# Patient Record
Sex: MALE | URBAN - METROPOLITAN AREA
[De-identification: names, ages, dates, MRNs, and addresses within clinical notes are randomized per-mention and may not be internally consistent; named-entity substitution may affect disease eponyms.]

---

## 2017-02-20 ENCOUNTER — APPOINTMENT (OUTPATIENT)
Age: 82
Setting detail: DERMATOLOGY
End: 2017-02-21

## 2017-02-20 DIAGNOSIS — L57.0 ACTINIC KERATOSIS: ICD-10-CM

## 2017-02-20 DIAGNOSIS — L82.1 OTHER SEBORRHEIC KERATOSIS: ICD-10-CM

## 2017-02-20 DIAGNOSIS — L82.0 INFLAMED SEBORRHEIC KERATOSIS: ICD-10-CM

## 2017-02-20 PROBLEM — D48.5 NEOPLASM OF UNCERTAIN BEHAVIOR OF SKIN: Status: ACTIVE | Noted: 2017-02-20

## 2017-02-20 PROCEDURE — 17110 DESTRUCT B9 LESION 1-14: CPT

## 2017-02-20 PROCEDURE — 99202 OFFICE O/P NEW SF 15 MIN: CPT | Mod: 25

## 2017-02-20 PROCEDURE — 11100: CPT | Mod: 59

## 2017-02-20 PROCEDURE — OTHER LIQUID NITROGEN: OTHER

## 2017-02-20 PROCEDURE — OTHER BIOPSY BY SHAVE METHOD: OTHER

## 2017-02-20 PROCEDURE — OTHER COUNSELING: OTHER

## 2017-02-20 PROCEDURE — 17003 DESTRUCT PREMALG LES 2-14: CPT

## 2017-02-20 PROCEDURE — 17000 DESTRUCT PREMALG LESION: CPT | Mod: 59

## 2017-02-20 ASSESSMENT — LOCATION SIMPLE DESCRIPTION DERM
LOCATION SIMPLE: LEFT FOREHEAD
LOCATION SIMPLE: LEFT SCALP

## 2017-02-20 ASSESSMENT — LOCATION ZONE DERM
LOCATION ZONE: FACE
LOCATION ZONE: SCALP

## 2017-02-20 ASSESSMENT — LOCATION DETAILED DESCRIPTION DERM
LOCATION DETAILED: LEFT SUPERIOR FOREHEAD
LOCATION DETAILED: LEFT LATERAL FRONTAL SCALP
LOCATION DETAILED: LEFT CENTRAL FRONTAL SCALP

## 2017-02-20 NOTE — PROCEDURE: BIOPSY BY SHAVE METHOD
Silver Nitrate Text: The wound bed was treated with silver nitrate after the biopsy was performed.
Hemostasis: Drysol
Electrodesiccation Text: The wound bed was treated with electrodesiccation after the biopsy was performed.
Size Of Lesion In Cm: 0
Biopsy Method: Double edge Personna blades
Billing Type: Third-Party Bill
Electrodesiccation And Curettage Text: The wound bed was treated with electrodesiccation and curettage after the biopsy was performed.
Bill For Surgical Tray: no
Anesthesia Type: 1% lidocaine with epinephrine and a 1:10 solution of 8.4% sodium bicarbonate
Notification Instructions: Patient will be notified of biopsy results. However, patient instructed to call the office if not contacted within 2 weeks.
Wound Care: Vaseline
Detail Level: Detailed
Curettage Text: The wound bed was treated with curettage after the biopsy was performed.
Consent: Written consent was obtained and risks were reviewed including but not limited to scarring, infection, bleeding, scabbing, incomplete removal, nerve damage and allergy to anesthesia.
Cryotherapy Text: The wound bed was treated with cryotherapy after the biopsy was performed.
Type Of Destruction Used: Curettage
Anesthesia Volume In Cc (Will Not Render If 0): 0.5
Dressing: bandage
Post-Care Instructions: I reviewed with the patient in detail post-care instructions. Patient is to keep the biopsy site dry overnight, and then apply bacitracin twice daily until healed. Patient may apply hydrogen peroxide soaks to remove any crusting.
Biopsy Type: H and E
Body Location Override (Optional - Billing Will Still Be Based On Selected Body Map Location If Applicable): L. Lateral forehead

## 2017-02-20 NOTE — PROCEDURE: LIQUID NITROGEN
Post-Care Instructions: I reviewed with the patient in detail post-care instructions. (Written instructions given) Patient is to wear sun protection, and avoid picking at any of the treated lesions. Pt may apply Vaseline to crusted or scabbing areas.
Total Number Of Aks Treated: 2
Duration Of Freeze Thaw-Cycle (Seconds): 15
Medical Necessity Clause: This procedure was medically necessary because the lesions that were treated were:
Consent: The patient's consent was obtained including but not limited to risks of crusting, scabbing, blistering, scarring, darker or lighter pigmentary change, recurrence, incomplete removal and infection.
Detail Level: Detailed
Detail Level: Simple
Duration Of Freeze Thaw-Cycle (Seconds): 10
Render Post-Care Instructions In Note?: no
Medical Necessity Information: It is in your best interest to select a reason for this procedure from the list below. All of these items fulfill various CMS LCD requirements except the new and changing color options.
Post-Care Instructions: I reviewed with the patient in detail post-care instructions. Patient is to wear sunprotection, and avoid picking at any of the treated lesions. Pt may apply Vaseline to crusted or scabbing areas.
Number Of Freeze-Thaw Cycles: 1 freeze-thaw cycle

## 2017-03-02 ENCOUNTER — APPOINTMENT (OUTPATIENT)
Age: 82
Setting detail: DERMATOLOGY
End: 2017-03-13

## 2017-03-02 PROBLEM — C44.91 BASAL CELL CARCINOMA OF SKIN, UNSPECIFIED: Status: ACTIVE | Noted: 2017-03-02

## 2017-03-02 PROCEDURE — OTHER MOHS SURGERY PHONE CONSULTATION: OTHER

## 2017-03-02 NOTE — PROCEDURE: MOHS SURGERY PHONE CONSULTATION
Has The Pathology Report Been Received?: Yes
Does The Patient Take Blood Thinners?: No
Patient Reported Location: forehead
If Yes- Details?: Subarachroid hemorrhage
Referring Provider: Izabela Salinas MPhil, MD
Date Of Mohs Surgery: 03/07/2017
Detail Level: Simple
Date Of Surgical Consultation If Needed: 03/02/2017
Time Of Mohs Surgery: 8:30

## 2017-03-07 ENCOUNTER — APPOINTMENT (OUTPATIENT)
Age: 82
Setting detail: DERMATOLOGY
End: 2017-03-13

## 2017-03-07 PROBLEM — C44.319 BASAL CELL CARCINOMA OF SKIN OF OTHER PARTS OF FACE: Status: ACTIVE | Noted: 2017-03-07

## 2017-03-07 PROCEDURE — 17312 MOHS ADDL STAGE: CPT

## 2017-03-07 PROCEDURE — 14041 TIS TRNFR F/C/C/M/N/A/G/H/F: CPT

## 2017-03-07 PROCEDURE — 17311 MOHS 1 STAGE H/N/HF/G: CPT

## 2017-03-07 PROCEDURE — OTHER MOHS SURGERY: OTHER

## 2017-03-07 NOTE — PROCEDURE: MOHS SURGERY
Body Location Override (Optional - Billing Will Still Be Based On Selected Body Map Location If Applicable): Left lateral forehead

## 2017-03-07 NOTE — PROCEDURE: MOHS SURGERY
Post-Care Instructions: The patient was provided with detailed verbal and written instructions for daily wound care, including use of H2O2 cleansing, followed by application of plain Vaseline and a bandage.  These instructions including Dr. Salinas's contact information should there be any questions or concerns.  The patient is not to engage in any heavy lifting, exercise, or swimming for the next week.  Should the patient develop any fevers, chills, bleeding, or pain not controlled by OTC analgesics, s/he should contact the office immediately.

## 2017-03-15 ENCOUNTER — APPOINTMENT (OUTPATIENT)
Age: 82
Setting detail: DERMATOLOGY
End: 2017-03-16

## 2017-03-15 DIAGNOSIS — Z48.02 ENCOUNTER FOR REMOVAL OF SUTURES: ICD-10-CM

## 2017-03-15 DIAGNOSIS — L81.4 OTHER MELANIN HYPERPIGMENTATION: ICD-10-CM

## 2017-03-15 DIAGNOSIS — L82.1 OTHER SEBORRHEIC KERATOSIS: ICD-10-CM

## 2017-03-15 PROCEDURE — 99214 OFFICE O/P EST MOD 30 MIN: CPT | Mod: 24

## 2017-03-15 PROCEDURE — OTHER SUTURE REMOVAL (GLOBAL PERIOD): OTHER

## 2017-03-15 PROCEDURE — OTHER COUNSELING: OTHER

## 2017-03-15 ASSESSMENT — LOCATION DETAILED DESCRIPTION DERM
LOCATION DETAILED: LEFT LATERAL FOREHEAD
LOCATION DETAILED: RIGHT MEDIAL UPPER BACK

## 2017-03-15 ASSESSMENT — LOCATION ZONE DERM
LOCATION ZONE: TRUNK
LOCATION ZONE: FACE

## 2017-03-15 ASSESSMENT — LOCATION SIMPLE DESCRIPTION DERM
LOCATION SIMPLE: LEFT FOREHEAD
LOCATION SIMPLE: RIGHT UPPER BACK

## 2017-03-15 NOTE — PROCEDURE: SUTURE REMOVAL (GLOBAL PERIOD)
Detail Level: Detailed
Add 60186 Cpt? (Important Note: In 2017 The Use Of 12517 Is Being Tracked By Cms To Determine Future Global Period Reimbursement For Global Periods): no

## 2017-04-25 ENCOUNTER — OFFICE VISIT (OUTPATIENT)
Dept: FAMILY MEDICINE | Facility: CLINIC | Age: 82
End: 2017-04-25
Payer: COMMERCIAL

## 2017-04-25 VITALS
WEIGHT: 175 LBS | DIASTOLIC BLOOD PRESSURE: 60 MMHG | OXYGEN SATURATION: 96 % | HEIGHT: 67 IN | BODY MASS INDEX: 27.47 KG/M2 | TEMPERATURE: 98 F | SYSTOLIC BLOOD PRESSURE: 126 MMHG | HEART RATE: 66 BPM | RESPIRATION RATE: 20 BRPM

## 2017-04-25 DIAGNOSIS — R09.82 POST-NASAL DRAINAGE: Primary | ICD-10-CM

## 2017-04-25 DIAGNOSIS — J30.2 SEASONAL ALLERGIC RHINITIS, UNSPECIFIED ALLERGIC RHINITIS TRIGGER: ICD-10-CM

## 2017-04-25 PROCEDURE — 99213 OFFICE O/P EST LOW 20 MIN: CPT | Performed by: INTERNAL MEDICINE

## 2017-04-25 NOTE — MR AVS SNAPSHOT
After Visit Summary   4/25/2017    Sharif Kumar    MRN: [id number]           Patient Information     Date Of Birth          1935        Visit Information        Provider Department      4/25/2017 2:15 PM Jose Maria Valera MD [facility]        Today's Diagnoses     Post-nasal drainage    -  1    Seasonal allergic rhinitis, unspecified allergic rhinitis trigger          Care Instructions    I suggest that you use flonase nasal spray, and use 2 sprays each nostril daily.        Also, take claritin or allegra or zyrtec daily.                     Follow-ups after your visit        Who to contact     If you have questions or need follow up information about today's clinic visit or your schedule please contact [facility] directly at [phone].  Normal or non-critical lab and imaging results will be communicated to you by MyChart, letter or phone within 4 business days after the clinic has received the results. If you do not hear from us within 7 days, please contact the clinic through MyChart or phone. If you have a critical or abnormal lab result, we will notify you by phone as soon as possible.  Submit refill requests through [employer] or call your pharmacy and they will forward the refill request to us. Please allow 3 business days for your refill to be completed.          Additional Information About Your Visit        MyChart Information     [employer] gives you secure access to your electronic health record. If you see a primary care provider, you can also send messages to your care team and make appointments. If you have questions, please call your primary care clinic.  If you do not have a primary care provider, please call [phone] and they will assist you.        Care EveryWhere ID     This is your Care EveryWhere ID. This could be used by other organizations to access your [place] medical records  [id number]        Your Vitals Were  "    Pulse Temperature Respirations Height Pulse Oximetry BMI (Body Mass Index)    66 98  F (36.7  C) 20 5' 7\" (1.702 m) 96% 27.41 kg/m2       Blood Pressure from Last 3 Encounters:   04/25/17 126/60   12/28/16 106/54   12/19/16 110/70    Weight from Last 3 Encounters:   04/25/17 175 lb (79.4 kg)   12/28/16 178 lb (80.7 kg)   12/19/16 171 lb 8 oz (77.8 kg)              Today, you had the following     No orders found for display       Primary Care Provider Office Phone # Fax #    Jose Maria Valera [phone]       [facility] [address] [facility] [address]        Thank you!     Thank you for choosing [facility]  for your care. Our goal is always to provide you with excellent care. Hearing back from our patients is one way we can continue to improve our services. Please take a few minutes to complete the written survey that you may receive in the mail after your visit with us. Thank you!             Your Updated Medication List - Protect others around you: Learn how to safely use, store and throw away your medicines at www.disposemymeds.org.          This list is accurate as of: 4/25/17  2:53 PM.  Always use your most recent med list.                   Brand Name Dispense Instructions for use    bismuth subsalicylate 262 MG chewable tablet    PEPTO BISMOL     Take 2 tablets by mouth daily as needed.       loratadine 10 MG tablet    CLARITIN    30 tablet    Take 1 tablet by mouth daily.       triamcinolone 0.5 % cream    KENALOG    30 g    Apply sparingly to affected area three times daily.         "

## 2017-04-25 NOTE — NURSING NOTE
"Chief Complaint   Patient presents with     Sinus Problem       Initial /60 (BP Location: Right arm, Patient Position: Chair, Cuff Size: Adult Regular)  Pulse 66  Temp 98  F (36.7  C)  Resp 20  Ht 5' 7\" (1.702 m)  Wt 175 lb (79.4 kg)  SpO2 96%  BMI 27.41 kg/m2 Estimated body mass index is 27.41 kg/(m^2) as calculated from the following:    Height as of this encounter: 5' 7\" (1.702 m).    Weight as of this encounter: 175 lb (79.4 kg).  Medication Reconciliation: complete   Jhoana Perez LPN  "

## 2017-04-25 NOTE — PATIENT INSTRUCTIONS
I suggest that you use flonase nasal spray, and use 2 sprays each nostril daily.        Also, take claritin or allegra or zyrtec daily.

## 2017-04-25 NOTE — PROGRESS NOTES
SUBJECTIVE:                                                    Sharif Kumar is a 81 year old male who presents to clinic today for the following health issues:      Acute Illness   Acute illness concerns: sinus issue  Onset: 3 weeks    Fever: no     Chills/Sweats: no     Headache (location?): YES- occ.    Sinus Pressure:no    Conjunctivitis:  no    Ear Pain: no    Rhinorrhea: no     Congestion: YES- occ.    Sore Throat: no      Cough: YES - occ.    Wheeze: no     Decreased Appetite: no     Nausea: no     Vomiting: no     Diarrhea:  no     Dysuria/Freq.: no     Fatigue/Achiness: YES-    Sick/Strep Exposure: no      Therapies Tried and outcome: nothing, but was on Z-ithromax recently from Dentist          C/o post nasal drainage and HA and some malaise, etc.,for 3 wks or so.                                        Had impacted food posterior molar, with infection; Rx zithromax.                            He does not recall using Flonase in the past.            Claritin is on his list of medications but he is not taking that.         He does report allergies to mold and pollen, and reports having had skin testing showing this.                              Problem list and histories reviewed & adjusted, as indicated.      Current Outpatient Prescriptions   Medication Sig Dispense Refill     triamcinolone (KENALOG) 0.5 % cream Apply sparingly to affected area three times daily. 30 g 0     bismuth subsalicylate (PEPTO BISMOL) 262 MG chewable tablet Take 2 tablets by mouth daily as needed.       loratadine (CLARITIN) 10 MG tablet Take 1 tablet by mouth daily. 30 tablet 1             Allergies   Allergen Reactions     No Known Drug Allergies      BP Readings from Last 3 Encounters:   04/25/17 126/60   12/28/16 106/54   12/19/16 110/70    Wt Readings from Last 3 Encounters:   04/25/17 175 lb (79.4 kg)   12/28/16 178 lb (80.7 kg)   12/19/16 171 lb 8 oz (77.8 kg)                    Reviewed and updated as needed this visit  "by clinical staff  Tobacco  Allergies  Meds  Med Hx  Surg Hx  Fam Hx  Soc Hx      Reviewed and updated as needed this visit by Provider         ROS:  CONSTITUTIONAL:NEGATIVE for fever, chills, change in weight  ENT/MOUTH: NEGATIVE for hoarseness and sore throat  RESP:NEGATIVE for significant cough or SOB    OBJECTIVE:                                                    /60 (BP Location: Right arm, Patient Position: Chair, Cuff Size: Adult Regular)  Pulse 66  Temp 98  F (36.7  C)  Resp 20  Ht 5' 7\" (1.702 m)  Wt 175 lb (79.4 kg)  SpO2 96%  BMI 27.41 kg/m2  Body mass index is 27.41 kg/(m^2).  GENERAL APPEARANCE: alert and no distress  HENT: ear canals and TM's normal and nose and mouth without ulcers or lesions  RESP: lungs clear to auscultation - no rales, rhonchi or wheezes    Diagnostic test results:  none      ASSESSMENT/PLAN:                                                        ICD-10-CM    1. Post-nasal drainage R09.82    2. Seasonal allergic rhinitis, unspecified allergic rhinitis trigger J30.2     mold,pollen       Antibiotics do not seem indicated here.  Patient Instructions   I suggest that you use flonase nasal spray, and use 2 sprays each nostril daily.        Also, take claritin or allegra or zyrtec daily.                   Jose Maria Valera MD  [facility]  "

## 2017-07-03 ENCOUNTER — TELEPHONE (OUTPATIENT)
Dept: FAMILY MEDICINE | Facility: CLINIC | Age: 82
End: 2017-07-03

## 2017-07-03 DIAGNOSIS — M79.604 PAIN OF RIGHT LOWER EXTREMITY: Primary | ICD-10-CM

## 2017-07-03 NOTE — TELEPHONE ENCOUNTER
Reason for Call:  Other call back    Detailed comments: pain in right calf-wants referral to PT perhaps says has been going on long time    Phone Number Patient can be reached at: Home number on file [phone] (home)    Best Time:     Can we leave a detailed message on this number? YES    Call taken on 7/3/2017 at 10:00 AM by SIN FLEMING

## 2017-07-03 NOTE — TELEPHONE ENCOUNTER
Patient called the clinic. Pt has pain/ache 3-7/10 from knee to hip on the right side.   He had a similar problem in December when the X-ray was taken 12/28/16.  Issue has resolved, but pt thinks he has re injured it. Walking exacerbates pain.   Pt tried a massage therapist who suggested that physical therapy would be more appropriate.   Requesting a referral to physical therapy, provider to review.

## 2017-07-03 NOTE — TELEPHONE ENCOUNTER
I have generated a referral order. Please  let the patient know,and give to him the phone info for KENIA.

## 2017-07-11 ENCOUNTER — THERAPY VISIT (OUTPATIENT)
Dept: PHYSICAL THERAPY | Facility: CLINIC | Age: 82
End: 2017-07-11
Payer: MEDICARE

## 2017-07-11 DIAGNOSIS — M79.651 PAIN OF RIGHT THIGH: Primary | ICD-10-CM

## 2017-07-11 PROCEDURE — G8978 MOBILITY CURRENT STATUS: HCPCS | Mod: GP | Performed by: PHYSICAL THERAPIST

## 2017-07-11 PROCEDURE — 97110 THERAPEUTIC EXERCISES: CPT | Mod: GP | Performed by: PHYSICAL THERAPIST

## 2017-07-11 PROCEDURE — 97161 PT EVAL LOW COMPLEX 20 MIN: CPT | Mod: GP | Performed by: PHYSICAL THERAPIST

## 2017-07-11 PROCEDURE — G8979 MOBILITY GOAL STATUS: HCPCS | Mod: GP | Performed by: PHYSICAL THERAPIST

## 2017-07-11 NOTE — PROGRESS NOTES
Subjective:    Patient is a 81 year old male presenting with rehab left ankle/foot hpi.                                      Pertinent medical history includes:  Depression, asthma and migraines.                                            Objective:    System    Physical Exam    General     ROS    Assessment/Plan:

## 2017-07-11 NOTE — LETTER
DEPARTMENT OF HEALTH AND HUMAN SERVICES  CENTERS FOR MEDICARE & MEDICAID SERVICES    PLAN/UPDATED PLAN OF PROGRESS FOR OUTPATIENT REHABILITATION    PATIENTS NAME:  Sharif Kumar   : 1935  PROVIDER NUMBER:    [id number]  [id number]   PROVIDER [facility]: [facility] PHYSICAL THERAPY  MEDICAL RECORD NUMBER: [id number]   START OF CARE DATE:  SOC Date: 17   TYPE:  PT  PRIMARY/TREATMENT DIAGNOSIS: (Pertinent Medical Diagnosis)  Pain of right thigh  VISITS FROM START OF CARE: 1 Rxs Used: 1     Physical Therapy Initial Evaluation  2017     Precautions/Restrictions/MD instructions: PT eval and treat.     Subjective:   Date of Onset: 16  Primary Symptoms/Location of Pain: R thigh pain anterolateral from hip to knee. Denies N/T, weakness, back pain. Quality of pain is dull and aching. Pains are described as intermittent in nature. Pain is worse: same throughout day. Pain is rated 5/10.   History of symptoms: Pains began gradually as the result of insidious onset. Tried massage, thought it was pretty well healed, did some exercises, and was able to take a trip to [facility] walking a ton. Since onset, symptoms are waxing and waning - gradually has come back. Possibly exacerbated by long drives a few weeks ago (lots of on/off gas pedal).   Worsened by: Walking >6 blocks.    Alleviated by: Ice.   General health as reported by patient: good  Pertinent medical/surgical history: Depression, asthma and migraines.. Imaging: x-ray (unremarkable). Current occupational status: Retired. Patient's goals are: decrease pain, be able to walk longer. Return to MD:  PRN.     Therapist Impression:   Sharif Kumar is a 81 year old male with 8-month history of R thigh pain. His impairments limit his ability to walk. Skilled PT services are necessary in order to reduce impairments and improve independent function.    Objective:    PATIENTS NAME:  Sharif Kumar   : 1935    HIP  EXAMINATION    Dynamic Movement Screen:  DL Squat: Anterior knee translation, Knee valgus, Hip internal rotation and Improper use of glutes/hips  Gait: Lateral trunk lean to L, decr trunk rotation and arm swing on L, greater relative ER on L, greater relative IR on R    HIP RANGE OF MOTION & STRENGTH  (* = patient s pain)   PROM L PROM R MMT L MMT R   Hip Flx 120 120* 5 4   Hip IR 90/90 20 20     Prone Bilateral IR       Hip ER 90/90 75 75     Hip ABD   3- 3-   Hip ADD   5 5   Hip Ext 5 5     Knee Ext   5 5   Knee Flx   4 4     Special Tests:  (+) R: None (-) R: FADIR, NINO and Scour  Lumbar/SIJ Screen:   Active ROM Limitation Pain   Flexion - -   Extension - -   L Sideglide - -   R Sideglide - -     Assessment/Plan:    The patient is a 81 year old male with chief complaint of R thigh pain.    The patient has the following significant findings with corresponding treatment plan.  Diagnosis 1:  R thigh pain    Pain -  hot/cold therapy, manual therapy, splint/taping/bracing/orthotics and self management  Decreased ROM/flexibility - manual therapy, therapeutic exercise and home program  Decreased joint mobility - manual therapy and therapeutic exercise  Decreased strength - therapeutic exercise, therapeutic activities and home program  Impaired gait - gait training  Impaired muscle performance - neuro re-education  Decreased function - therapeutic activities  Therapy Evaluation Codes:   1) History comprised of:   Personal factors that impact the plan of care:      None.    Comorbidity factors that impact the plan of care are:      None.     Medications impacting care: None.  2) Examination of Body Systems comprised of:  PATIENTS NAME:  Sharif Kumar   : 1935      Body structures and functions that impact the plan of care:      Hip.   Activity limitations that impact the plan of care are:      Walking.   Clinical presentation characteristics are:    Stable/Uncomplicated.  3) Presentation comprised  "of:   Presentation scored as Low complexity with uncomplicated characteristics..  4) Decision-Making    Low complexity using standardized patient assessment instrument and/or measureable assessment of functional outcome.  Cumulative Therapy Evaluation is: Low complexity.  Previous and current functional limitations:  (See Goal Flow Sheet for this information)    Short term and Long term goals: (See Goal Flow Sheet for this information)   Communication ability:  Patient appears to be able to clearly communicate and understand verbal and written communication and follow directions correctly.  Treatment Explanation - The following has been discussed with the patient: RX ordered/plan of care, anticipated outcomes, and possible risks and side effects.  This patient would benefit from PT intervention to resume normal activities.   Rehab potential is good.  Frequency:  1 X week, once daily  Duration:  for 6 weeks  Discharge Plan: Achieve all LTGs, be independent in home treatment program, and reach maximal therapeutic benefit.                            Caregiver Signature/Credentials _____________________________ Date ________       Treating Provider: Mikael Martinez DPT, OCS   I have reviewed and certified the need for these services and plan of treatment while under my care.        PHYSICIAN'S SIGNATURE:   ____________________________________  Date___________     Jose Maria NERI Valera    Certification period:  Beginning of Cert date period: 07/11/17 to  End of Cert period date: 09/29/17     Functional Level Progress Report: Please see attached \"Goal Flow sheet for Functional level.\"    ____X____ Continue Services or       ________ DC Services                Service dates: From  SOC Date: 07/11/17 date to present                         "

## 2017-07-11 NOTE — PROGRESS NOTES
Physical Therapy Initial Evaluation  July 11, 2017     Precautions/Restrictions/MD instructions: PT eval and treat.     Subjective:   Date of Onset: 11/1/16  Primary Symptoms/Location of Pain: R thigh pain anterolateral from hip to knee. Denies N/T, weakness, back pain. Quality of pain is dull and aching. Pains are described as intermittent in nature. Pain is worse: same throughout day. Pain is rated 5/10.   History of symptoms: Pains began gradually as the result of insidious onset. Tried massage, thought it was pretty well healed, did some exercises, and was able to take a trip to [place] walking a ton. Since onset, symptoms are waxing and waning - gradually has come back. Possibly exacerbated by long drives a few weeks ago (lots of on/off gas pedal).   Worsened by: Walking >6 blocks.    Alleviated by: Ice.   General health as reported by patient: good  Pertinent medical/surgical history: none. Imaging: x-ray (unremarkable). Current occupational status: Retired. Patient's goals are: decrease pain, be able to walk longer. Return to MD:  PRN.     Therapist Impression:   Sharif Kumar is a 81 year old male with 8-month history of R thigh pain. His impairments limit his ability to walk. Skilled PT services are necessary in order to reduce impairments and improve independent function.    Objective:  HIP EXAMINATION    Dynamic Movement Screen:  DL Squat: Anterior knee translation, Knee valgus, Hip internal rotation and Improper use of glutes/hips  Gait: Lateral trunk lean to L, decr trunk rotation and arm swing on L, greater relative ER on L, greater relative IR on R    HIP RANGE OF MOTION & STRENGTH  (* = patient s pain)   PROM L PROM R MMT L MMT R   Hip Flx 120 120* 5 4   Hip IR 90/90 20 20     Prone Bilateral IR       Hip ER 90/90 75 75     Hip ABD   3- 3-   Hip ADD   5 5   Hip Ext 5 5     Knee Ext   5 5   Knee Flx   4 4     Special Tests:  (+) R: None (-) R: FADIR NINO and Jamal    Lumbar/SIJ Screen:   Active ROM  Limitation Pain   Flexion - -   Extension - -   L Sideglide - -   R Sideglide - -     Assessment/Plan:    The patient is a 81 year old male with chief complaint of R thigh pain.    The patient has the following significant findings with corresponding treatment plan.  Diagnosis 1:  R thigh pain    Pain -  hot/cold therapy, manual therapy, splint/taping/bracing/orthotics and self management  Decreased ROM/flexibility - manual therapy, therapeutic exercise and home program  Decreased joint mobility - manual therapy and therapeutic exercise  Decreased strength - therapeutic exercise, therapeutic activities and home program  Impaired gait - gait training  Impaired muscle performance - neuro re-education  Decreased function - therapeutic activities    Therapy Evaluation Codes:   1) History comprised of:   Personal factors that impact the plan of care:      None.    Comorbidity factors that impact the plan of care are:      None.     Medications impacting care: None.  2) Examination of Body Systems comprised of:   Body structures and functions that impact the plan of care:      Hip.   Activity limitations that impact the plan of care are:      Walking.   Clinical presentation characteristics are:    Stable/Uncomplicated.  3) Presentation comprised of:   Presentation scored as Low complexity with uncomplicated characteristics..  4) Decision-Making    Low complexity using standardized patient assessment instrument and/or measureable assessment of functional outcome.  Cumulative Therapy Evaluation is: Low complexity.    Previous and current functional limitations:  (See Goal Flow Sheet for this information)    Short term and Long term goals: (See Goal Flow Sheet for this information)     Communication ability:  Patient appears to be able to clearly communicate and understand verbal and written communication and follow directions correctly.  Treatment Explanation - The following has been discussed with the patient: RX  ordered/plan of care, anticipated outcomes, and possible risks and side effects.  This patient would benefit from PT intervention to resume normal activities.   Rehab potential is good.    Frequency:  1 X week, once daily  Duration:  for 6 weeks  Discharge Plan: Achieve all LTGs, be independent in home treatment program, and reach maximal therapeutic benefit.    Please refer to the daily flowsheet for treatment today, total treatment time and time spent performing 1:1 timed codes.

## 2017-07-13 ENCOUNTER — TELEPHONE (OUTPATIENT)
Dept: FAMILY MEDICINE | Facility: CLINIC | Age: 82
End: 2017-07-13

## 2017-07-13 NOTE — TELEPHONE ENCOUNTER
Reason for Call:  Form, our goal is to have forms completed with 72 hours, however, some forms may require a visit or additional information.    Type of letter, form or note:  medical    Who is the form from?: Home care    Where did the form come from: form was faxed in    What clinic location was the form placed at?: [facility]    Where the form was placed: 's Box: Jose Maria Valera MD    What number is listed as a contact on the form?: [phone]       Additional comments: [facility] on [facility]  [facility]    Call taken on 7/13/2017 at 11:44 AM by Amelia Puga

## 2017-07-18 ENCOUNTER — THERAPY VISIT (OUTPATIENT)
Dept: PHYSICAL THERAPY | Facility: CLINIC | Age: 82
End: 2017-07-18
Payer: MEDICARE

## 2017-07-18 DIAGNOSIS — M79.651 PAIN OF RIGHT THIGH: ICD-10-CM

## 2017-07-18 PROCEDURE — 97140 MANUAL THERAPY 1/> REGIONS: CPT | Mod: GP | Performed by: PHYSICAL THERAPIST

## 2017-07-18 PROCEDURE — 97110 THERAPEUTIC EXERCISES: CPT | Mod: GP | Performed by: PHYSICAL THERAPIST

## 2017-08-01 ENCOUNTER — THERAPY VISIT (OUTPATIENT)
Dept: PHYSICAL THERAPY | Facility: CLINIC | Age: 82
End: 2017-08-01
Payer: MEDICARE

## 2017-08-01 DIAGNOSIS — M79.651 PAIN OF RIGHT THIGH: ICD-10-CM

## 2017-08-01 PROCEDURE — 97530 THERAPEUTIC ACTIVITIES: CPT | Mod: GP | Performed by: PHYSICAL THERAPIST

## 2017-08-01 PROCEDURE — 97140 MANUAL THERAPY 1/> REGIONS: CPT | Mod: GP | Performed by: PHYSICAL THERAPIST

## 2017-08-01 PROCEDURE — 97110 THERAPEUTIC EXERCISES: CPT | Mod: GP | Performed by: PHYSICAL THERAPIST

## 2017-08-16 ENCOUNTER — THERAPY VISIT (OUTPATIENT)
Dept: PHYSICAL THERAPY | Facility: CLINIC | Age: 82
End: 2017-08-16
Payer: MEDICARE

## 2017-08-16 DIAGNOSIS — M79.651 PAIN OF RIGHT THIGH: ICD-10-CM

## 2017-08-16 PROCEDURE — 97530 THERAPEUTIC ACTIVITIES: CPT | Mod: GP | Performed by: PHYSICAL THERAPIST

## 2017-08-16 PROCEDURE — 97110 THERAPEUTIC EXERCISES: CPT | Mod: GP | Performed by: PHYSICAL THERAPIST

## 2017-08-16 PROCEDURE — 97140 MANUAL THERAPY 1/> REGIONS: CPT | Mod: GP | Performed by: PHYSICAL THERAPIST

## 2017-08-30 ENCOUNTER — THERAPY VISIT (OUTPATIENT)
Dept: PHYSICAL THERAPY | Facility: CLINIC | Age: 82
End: 2017-08-30
Payer: MEDICARE

## 2017-08-30 DIAGNOSIS — M79.651 PAIN OF RIGHT THIGH: ICD-10-CM

## 2017-08-30 PROCEDURE — 97530 THERAPEUTIC ACTIVITIES: CPT | Mod: GP | Performed by: PHYSICAL THERAPIST

## 2017-08-30 PROCEDURE — 97140 MANUAL THERAPY 1/> REGIONS: CPT | Mod: GP | Performed by: PHYSICAL THERAPIST

## 2017-08-30 PROCEDURE — 97110 THERAPEUTIC EXERCISES: CPT | Mod: GP | Performed by: PHYSICAL THERAPIST

## 2017-09-13 ENCOUNTER — THERAPY VISIT (OUTPATIENT)
Dept: PHYSICAL THERAPY | Facility: CLINIC | Age: 82
End: 2017-09-13
Payer: MEDICARE

## 2017-09-13 DIAGNOSIS — M79.651 PAIN OF RIGHT THIGH: ICD-10-CM

## 2017-09-13 PROCEDURE — 97530 THERAPEUTIC ACTIVITIES: CPT | Mod: GP | Performed by: PHYSICAL THERAPIST

## 2017-09-13 PROCEDURE — 97110 THERAPEUTIC EXERCISES: CPT | Mod: GP | Performed by: PHYSICAL THERAPIST

## 2017-09-13 PROCEDURE — 97112 NEUROMUSCULAR REEDUCATION: CPT | Mod: GP | Performed by: PHYSICAL THERAPIST

## 2017-09-18 ENCOUNTER — APPOINTMENT (OUTPATIENT)
Age: 82
Setting detail: DERMATOLOGY
End: 2017-10-02

## 2017-09-18 DIAGNOSIS — Z85.828 PERSONAL HISTORY OF OTHER MALIGNANT NEOPLASM OF SKIN: ICD-10-CM

## 2017-09-18 DIAGNOSIS — L82.1 OTHER SEBORRHEIC KERATOSIS: ICD-10-CM

## 2017-09-18 DIAGNOSIS — L738 OTHER SPECIFIED DISEASES OF HAIR AND HAIR FOLLICLES: ICD-10-CM

## 2017-09-18 DIAGNOSIS — L663 OTHER SPECIFIED DISEASES OF HAIR AND HAIR FOLLICLES: ICD-10-CM

## 2017-09-18 DIAGNOSIS — L81.4 OTHER MELANIN HYPERPIGMENTATION: ICD-10-CM

## 2017-09-18 DIAGNOSIS — L57.0 ACTINIC KERATOSIS: ICD-10-CM

## 2017-09-18 DIAGNOSIS — D22 MELANOCYTIC NEVI: ICD-10-CM

## 2017-09-18 PROBLEM — D48.5 NEOPLASM OF UNCERTAIN BEHAVIOR OF SKIN: Status: ACTIVE | Noted: 2017-09-18

## 2017-09-18 PROBLEM — L02.425 FURUNCLE OF RIGHT LOWER LIMB: Status: ACTIVE | Noted: 2017-09-18

## 2017-09-18 PROBLEM — D22.5 MELANOCYTIC NEVI OF TRUNK: Status: ACTIVE | Noted: 2017-09-18

## 2017-09-18 PROCEDURE — 11100: CPT | Mod: 59

## 2017-09-18 PROCEDURE — OTHER BIOPSY BY SHAVE METHOD: OTHER

## 2017-09-18 PROCEDURE — OTHER LIQUID NITROGEN: OTHER

## 2017-09-18 PROCEDURE — OTHER MIPS QUALITY: OTHER

## 2017-09-18 PROCEDURE — 99214 OFFICE O/P EST MOD 30 MIN: CPT | Mod: 25

## 2017-09-18 PROCEDURE — OTHER COUNSELING: OTHER

## 2017-09-18 PROCEDURE — 17000 DESTRUCT PREMALG LESION: CPT

## 2017-09-18 ASSESSMENT — LOCATION DETAILED DESCRIPTION DERM
LOCATION DETAILED: RIGHT ANTERIOR PROXIMAL THIGH
LOCATION DETAILED: INFERIOR THORACIC SPINE
LOCATION DETAILED: LEFT LATERAL FOREHEAD
LOCATION DETAILED: LEFT INFERIOR MEDIAL UPPER BACK
LOCATION DETAILED: LEFT SUPERIOR PARIETAL SCALP

## 2017-09-18 ASSESSMENT — LOCATION SIMPLE DESCRIPTION DERM
LOCATION SIMPLE: LEFT FOREHEAD
LOCATION SIMPLE: UPPER BACK
LOCATION SIMPLE: LEFT UPPER BACK
LOCATION SIMPLE: RIGHT THIGH
LOCATION SIMPLE: SCALP

## 2017-09-18 ASSESSMENT — LOCATION ZONE DERM
LOCATION ZONE: FACE
LOCATION ZONE: LEG
LOCATION ZONE: TRUNK
LOCATION ZONE: SCALP

## 2017-09-18 NOTE — PROCEDURE: BIOPSY BY SHAVE METHOD
Hemostasis: Drysol
Silver Nitrate Text: The wound bed was treated with silver nitrate after the biopsy was performed.
Bill For Surgical Tray: no
Cryotherapy Text: The wound bed was treated with cryotherapy after the biopsy was performed.
Billing Type: Third-Party Bill
Anesthesia Volume In Cc (Will Not Render If 0): 0.3
Electrodesiccation Text: The wound bed was treated with electrodesiccation after the biopsy was performed.
Biopsy Method: Double edge Personna blades
Anesthesia Type: 0.5% lidocaine with 1:200,000 epinephrine
Post-Care Instructions: I reviewed with the patient in detail post-care instructions. Patient is to keep the biopsy site dry overnight, and then apply H2O2, Vaseline and a bandage daily until healed.
Dressing: bandage
Render Post-Care Instructions In Note?: yes
Type Of Destruction Used: Electrodesiccation
Curettage Text: The wound bed was treated with curettage after the biopsy was performed.
Size Of Lesion In Cm: 0.5
Consent: Written consent was obtained and risks were reviewed including but not limited to scarring, infection, bleeding, scabbing, incomplete removal, nerve damage and allergy to anesthesia.
Notification Instructions: Patient will be notified of biopsy results. However, patient instructed to call the office if not contacted within 2 weeks.
Electrodesiccation And Curettage Text: The wound bed was treated with electrodesiccation and curettage after the biopsy was performed.
Body Location Override (Optional - Billing Will Still Be Based On Selected Body Map Location If Applicable): left lateral cheek
Biopsy Type: H and E
Additional Anesthesia Volume In Cc (Will Not Render If 0): 0
Detail Level: Detailed
Wound Care: Vaseline

## 2017-09-18 NOTE — PROCEDURE: MIPS QUALITY
Detail Level: Detailed
Quality 131: Pain Assessment And Follow-Up: Pain assessment using a standardized tool is documented as negative, no follow-up plan required
Quality 265: Biopsy Follow-Up: Biopsy results reviewed, communicated, tracked, and documented
Quality 431: Preventive Care And Screening: Unhealthy Alcohol Use - Screening: Patient screened for unhealthy alcohol use using a single question and scores less than 2 times per year
Quality 226: Preventive Care And Screening: Tobacco Use: Screening And Cessation Intervention: Patient screened for tobacco and is an ex-smoker
Quality 130: Documentation Of Current Medications In The Medical Record: Current Medications Documented
Quality 110: Preventive Care And Screening: Influenza Immunization: Influenza Immunization Ordered or Recommended, but not Administered
Quality 110: Preventive Care And Screening: Influenza Immunization: Influenza Immunization Administered during Influenza season

## 2017-09-18 NOTE — HPI: SKIN CHECK
What Is The Reason For Today's Visit?: the risk of recurrence, and the development of new lesions
Additional History: He has multiple pigmented lesions distributed throughout the body that he would like checked today. These are asymptomatic, mild in severity, present for years and have not been treated. Specifically, he has a a red lesion on his left cheek which has been present for 1 years. He has previously cut this lesion with his razor causing it to bleed. He also notices right areas on his scalp.

## 2017-09-18 NOTE — PROCEDURE: LIQUID NITROGEN
Post-Care Instructions: I reviewed with the patient in detail post-care instructions. Patient is to wear sunprotection, and avoid picking at any of the treated lesions. Pt may apply Vaseline to crusted or scabbing areas.
Consent: The patient's consent was obtained including but not limited to risks of crusting, scabbing, blistering, scarring, darker or lighter pigmentary change, recurrence, incomplete removal and infection.
Detail Level: Detailed
Number Of Freeze-Thaw Cycles: 1 freeze-thaw cycle
Render Post-Care Instructions In Note?: yes
Duration Of Freeze Thaw-Cycle (Seconds): 15

## 2017-09-26 ENCOUNTER — THERAPY VISIT (OUTPATIENT)
Dept: PHYSICAL THERAPY | Facility: CLINIC | Age: 82
End: 2017-09-26
Payer: MEDICARE

## 2017-09-26 DIAGNOSIS — M79.651 PAIN OF RIGHT THIGH: ICD-10-CM

## 2017-09-26 PROCEDURE — 97110 THERAPEUTIC EXERCISES: CPT | Mod: GP | Performed by: PHYSICAL THERAPIST

## 2017-09-26 PROCEDURE — 97530 THERAPEUTIC ACTIVITIES: CPT | Mod: GP | Performed by: PHYSICAL THERAPIST

## 2017-10-13 ENCOUNTER — APPOINTMENT (OUTPATIENT)
Age: 82
Setting detail: DERMATOLOGY
End: 2017-10-18

## 2017-10-13 PROBLEM — C44.319 BASAL CELL CARCINOMA OF SKIN OF OTHER PARTS OF FACE: Status: ACTIVE | Noted: 2017-10-13

## 2017-10-13 PROCEDURE — OTHER MOHS SURGERY PHONE CONSULTATION: OTHER

## 2017-10-13 NOTE — PROCEDURE: MOHS SURGERY PHONE CONSULTATION
Has The Patient Ever Had A Joint Replaced?: No
Office Location Of Mohs Surgery: [facility]
Detail Level: Detailed
Has The Patient Ever Had A Heart Attack Or Stroke?: Yes
Date Of Surgical Consultation If Needed: 10/13/2017
Time Of Mohs Surgery: 1100
Date Of Mohs Surgery: 10/17/2017
Patient Reported Location: left cheek in front of ear
Referring Provider: Izabela Salinas MD

## 2017-10-17 ENCOUNTER — APPOINTMENT (OUTPATIENT)
Age: 82
Setting detail: DERMATOLOGY
End: 2017-10-18

## 2017-10-17 DIAGNOSIS — L72.8 OTHER FOLLICULAR CYSTS OF THE SKIN AND SUBCUTANEOUS TISSUE: ICD-10-CM

## 2017-10-17 DIAGNOSIS — L57.0 ACTINIC KERATOSIS: ICD-10-CM

## 2017-10-17 PROBLEM — C44.319 BASAL CELL CARCINOMA OF SKIN OF OTHER PARTS OF FACE: Status: ACTIVE | Noted: 2017-10-17

## 2017-10-17 PROCEDURE — OTHER MOHS SURGERY: OTHER

## 2017-10-17 PROCEDURE — 13132 CMPLX RPR F/C/C/M/N/AX/G/H/F: CPT | Mod: 59

## 2017-10-17 PROCEDURE — 99213 OFFICE O/P EST LOW 20 MIN: CPT | Mod: 25

## 2017-10-17 PROCEDURE — 17311 MOHS 1 STAGE H/N/HF/G: CPT

## 2017-10-17 PROCEDURE — 17312 MOHS ADDL STAGE: CPT

## 2017-10-17 PROCEDURE — OTHER MIPS QUALITY: OTHER

## 2017-10-17 PROCEDURE — 17003 DESTRUCT PREMALG LES 2-14: CPT

## 2017-10-17 PROCEDURE — 17000 DESTRUCT PREMALG LESION: CPT

## 2017-10-17 PROCEDURE — OTHER COUNSELING: OTHER

## 2017-10-17 PROCEDURE — OTHER LIQUID NITROGEN: OTHER

## 2017-10-17 ASSESSMENT — LOCATION ZONE DERM
LOCATION ZONE: NECK
LOCATION ZONE: SCALP

## 2017-10-17 ASSESSMENT — LOCATION DETAILED DESCRIPTION DERM
LOCATION DETAILED: RIGHT CENTRAL PARIETAL SCALP
LOCATION DETAILED: RIGHT SUPERIOR POSTERIOR NECK
LOCATION DETAILED: RIGHT SUPERIOR PARIETAL SCALP

## 2017-10-17 ASSESSMENT — LOCATION SIMPLE DESCRIPTION DERM
LOCATION SIMPLE: SCALP
LOCATION SIMPLE: POSTERIOR NECK

## 2017-10-17 NOTE — PROCEDURE: MOHS SURGERY
Body Location Override (Optional - Billing Will Still Be Based On Selected Body Map Location If Applicable): Left Lateral Cheek

## 2017-10-17 NOTE — PROCEDURE: MIPS QUALITY
Quality 431: Preventive Care And Screening: Unhealthy Alcohol Use - Screening: Patient screened for unhealthy alcohol use using a single question and scores less than 2 times per year
Quality 226: Preventive Care And Screening: Tobacco Use: Screening And Cessation Intervention: Patient screened for tobacco and is an ex-smoker
Detail Level: Detailed
Quality 130: Documentation Of Current Medications In The Medical Record: Current Medications Documented
Quality 110: Preventive Care And Screening: Influenza Immunization: Influenza Immunization previously received during influenza season
Quality 131: Pain Assessment And Follow-Up: Pain assessment using a standardized tool is documented as negative, no follow-up plan required

## 2017-10-17 NOTE — PROCEDURE: LIQUID NITROGEN
Number Of Freeze-Thaw Cycles: 1 freeze-thaw cycle
Total Number Of Aks Treated: 5
Duration Of Freeze Thaw-Cycle (Seconds): 10
Render Post-Care Instructions In Note?: yes
Detail Level: Detailed
Consent: The patient's consent was obtained including but not limited to risks of crusting, scabbing, blistering, scarring, darker or lighter pigmentary change, recurrence, incomplete removal and infection.
Post-Care Instructions: I reviewed with the patient in detail post-care instructions. (Written instructions given) Patient is to wear sun protection, and avoid picking at any of the treated lesions. Pt may apply Vaseline to crusted or scabbing areas.

## 2017-10-24 ENCOUNTER — APPOINTMENT (OUTPATIENT)
Age: 82
Setting detail: DERMATOLOGY
End: 2017-10-30

## 2017-10-24 DIAGNOSIS — Z48.02 ENCOUNTER FOR REMOVAL OF SUTURES: ICD-10-CM

## 2017-10-24 PROCEDURE — OTHER COUNSELING: OTHER

## 2017-10-24 PROCEDURE — OTHER SUTURE REMOVAL (GLOBAL PERIOD): OTHER

## 2017-10-24 ASSESSMENT — LOCATION SIMPLE DESCRIPTION DERM: LOCATION SIMPLE: LEFT CHEEK

## 2017-10-24 ASSESSMENT — LOCATION DETAILED DESCRIPTION DERM: LOCATION DETAILED: LEFT LATERAL MALAR CHEEK

## 2017-10-24 ASSESSMENT — LOCATION ZONE DERM: LOCATION ZONE: FACE

## 2017-10-24 NOTE — PROCEDURE: SUTURE REMOVAL (GLOBAL PERIOD)
Detail Level: Detailed
Add 63564 Cpt? (Important Note: In 2017 The Use Of 75009 Is Being Tracked By Cms To Determine Future Global Period Reimbursement For Global Periods): no
Body Location Override (Optional - Billing Will Still Be Based On Selected Body Map Location If Applicable): left lateral cheek

## 2017-11-04 ENCOUNTER — OFFICE VISIT (OUTPATIENT)
Dept: FAMILY MEDICINE | Facility: CLINIC | Age: 82
End: 2017-11-04
Payer: COMMERCIAL

## 2017-11-04 VITALS
RESPIRATION RATE: 20 BRPM | TEMPERATURE: 97.3 F | HEIGHT: 65 IN | DIASTOLIC BLOOD PRESSURE: 50 MMHG | HEART RATE: 73 BPM | SYSTOLIC BLOOD PRESSURE: 98 MMHG | OXYGEN SATURATION: 98 % | BODY MASS INDEX: 27.66 KG/M2 | WEIGHT: 166 LBS

## 2017-11-04 DIAGNOSIS — M54.41 CHRONIC MIDLINE LOW BACK PAIN WITH RIGHT-SIDED SCIATICA: Primary | ICD-10-CM

## 2017-11-04 DIAGNOSIS — G89.29 CHRONIC MIDLINE LOW BACK PAIN WITH RIGHT-SIDED SCIATICA: Primary | ICD-10-CM

## 2017-11-04 DIAGNOSIS — H61.23 BILATERAL IMPACTED CERUMEN: ICD-10-CM

## 2017-11-04 DIAGNOSIS — Z23 NEED FOR PROPHYLACTIC VACCINATION AND INOCULATION AGAINST INFLUENZA: ICD-10-CM

## 2017-11-04 PROCEDURE — 99213 OFFICE O/P EST LOW 20 MIN: CPT | Mod: 25 | Performed by: INTERNAL MEDICINE

## 2017-11-04 PROCEDURE — G0008 ADMIN INFLUENZA VIRUS VAC: HCPCS | Performed by: INTERNAL MEDICINE

## 2017-11-04 PROCEDURE — 90662 IIV NO PRSV INCREASED AG IM: CPT | Performed by: INTERNAL MEDICINE

## 2017-11-04 PROCEDURE — 69210 REMOVE IMPACTED EAR WAX UNI: CPT | Performed by: INTERNAL MEDICINE

## 2017-11-04 NOTE — MR AVS SNAPSHOT
After Visit Summary   11/4/2017    Sharif Kumar    MRN: [id number]           Patient Information     Date Of Birth          1935        Visit Information        Provider Department      11/4/2017 9:45 AM Jose Maria Valera MD [facility]        Today's Diagnoses     Chronic midline low back pain with right-sided sciatica    -  1    Bilateral impacted cerumen        Need for prophylactic vaccination and inoculation against influenza          Care Instructions    You are planning an MRI of your lumbar spine.           Follow-ups after your visit        Additional Services     RADIOLOGY REFERRAL       Your provider has referred you to: [facility]: [facility] [phone]   [email or web address]                   Please do an MRI of the lumbar spine.                He has symptoms of a right sided L3-4 radiculopathy.                        Please be aware that coverage of these services is subject to the terms and limitations of your health insurance plan.  Call member services at your health plan with any benefit or coverage questions.      Please bring the following to your appointment:    >>   Any x-rays, CTs or MRIs which have been performed.  Contact the facility where they were done to arrange for  prior to your scheduled appointment.    >>   List of current medications   >>   This referral request   >>   Any documents/labs given to you for this referral    Prior authorization is required for MRI/MRA, CT, Dexa Scans and Worker's Compensation cases.                  Who to contact     If you have questions or need follow up information about today's clinic visit or your schedule please contact [facility] directly at [phone].  Normal or non-critical lab and imaging results will be communicated to you by MyChart, letter or phone within 4 business days after the clinic has received the results. If you do not hear from us  "within 7 days, please contact the clinic through [employer] or phone. If you have a critical or abnormal lab result, we will notify you by phone as soon as possible.  Submit refill requests through [employer] or call your pharmacy and they will forward the refill request to us. Please allow 3 business days for your refill to be completed.          Additional Information About Your Visit        [employer] Information     [employer] gives you secure access to your electronic health record. If you see a primary care provider, you can also send messages to your care team and make appointments. If you have questions, please call your primary care clinic.  If you do not have a primary care provider, please call [phone] and they will assist you.        Care EveryWhere ID     This is your Care EveryWhere ID. This could be used by other organizations to access your [facility] medical records  [id number]        Your Vitals Were     Pulse Temperature Respirations Height Pulse Oximetry BMI (Body Mass Index)    73 97.3  F (36.3  C) (Tympanic) 20 5' 5.25\" (1.657 m) 98% 27.41 kg/m2       Blood Pressure from Last 3 Encounters:   11/04/17 98/50   04/25/17 126/60   12/28/16 106/54    Weight from Last 3 Encounters:   11/04/17 166 lb (75.3 kg)   04/25/17 175 lb (79.4 kg)   12/28/16 178 lb (80.7 kg)              We Performed the Following     Asthma Action Plan (AAP)     RADIOLOGY REFERRAL        Primary Care Provider Office Phone # Fax #    Jose Maria Valera [phone] [address] [facility] [address]        Equal Access to Services     [facility] : Hadii liang Conner, waaxda jose, qaybta tae min. So [facility] [phone].    ATENCIÓN: Si habla español, tiene a woody disposición servicios gratuitos de asistencia lingüística. Llame al [phone].    We comply with applicable federal civil rights laws and [place] laws. We do not discriminate on " the basis of race, color, national origin, age, disability, sex, sexual orientation, or gender identity.            Thank you!     Thank you for choosing [facility]  for your care. Our goal is always to provide you with excellent care. Hearing back from our patients is one way we can continue to improve our services. Please take a few minutes to complete the written survey that you may receive in the mail after your visit with us. Thank you!             Your Updated Medication List - Protect others around you: Learn how to safely use, store and throw away your medicines at www.disposemymeds.org.          This list is accurate as of: 11/4/17 10:08 AM.  Always use your most recent med list.                   Brand Name Dispense Instructions for use Diagnosis    bismuth subsalicylate 262 MG chewable tablet    PEPTO BISMOL     Take 2 tablets by mouth daily as needed.        loratadine 10 MG tablet    CLARITIN    30 tablet    Take 1 tablet by mouth daily.    Chronic rhinitis       triamcinolone 0.5 % cream    KENALOG    30 g    Apply sparingly to affected area three times daily.    Dermatitis

## 2017-11-04 NOTE — PROGRESS NOTES
"  SUBJECTIVE:   Sharif Kumar is a 82 year old male who presents to clinic today for the following health issues:    Musculoskeletal problem/pain      Duration: x 6 months    Description  Location: Right Hip    Intensity:  moderate    Accompanying signs and symptoms: none    History  Previous similar problem: YES  Previous evaluation:  x-ray    Precipitating or alleviating factors:  Trauma or overuse: no   Aggravating factors include: standing    Therapies tried and outcome: massage and physical therapy      He has ongoing aching discomfort in the right upper anterior leg from the groin to the knee.        This bothers him with walking or standing.              an x-ray last December showed mild arthritic changes.            Physical therapy has been minimally helpful       Problem list and histories reviewed & adjusted, as indicated.  Additional history: as documented    BP Readings from Last 3 Encounters:   11/04/17 98/50   04/25/17 126/60   12/28/16 106/54    Wt Readings from Last 3 Encounters:   11/04/17 166 lb (75.3 kg)   04/25/17 175 lb (79.4 kg)   12/28/16 178 lb (80.7 kg)                      Reviewed and updated as needed this visit by clinical staffTobacco  Allergies  Meds  Problems  Med Hx  Surg Hx  Fam Hx  Soc Hx        Reviewed and updated as needed this visit by Provider         ROS:  CONSTITUTIONAL:NEGATIVE for fever, chills, change in weight  MUSCULOSKELETAL: NEGATIVE for muscle weakness right leg  NEURO: NEGATIVE for numbness or tingling  and paresthesias     OBJECTIVE:                                                    BP 98/50  Pulse 73  Temp 97.3  F (36.3  C) (Tympanic)  Resp 20  Ht 5' 5.25\" (1.657 m)  Wt 166 lb (75.3 kg)  SpO2 98%  BMI 27.41 kg/m2  Body mass index is 27.41 kg/(m^2).  GENERAL APPEARANCE: alert and no distress  HENT: Cerumen in both ear canals. Removed by me on the right, washed out by the aid on the left.  MS: Very slight decreased range of motion right hip but this " is painless  NEURO: Normal strength and tone and right knee jerk is absent, while the left is present     Diagnostic test results:  none        ASSESSMENT/PLAN:                                                        ICD-10-CM    1. Chronic midline low back pain with right-sided sciatica M54.41 RADIOLOGY REFERRAL    G89.29    2. Bilateral impacted cerumen H61.23    3. Need for prophylactic vaccination and inoculation against influenza Z23        This could be a lumbar radiculopathy, such as an L4-5 level,given the absent R knee reflex.                   He would consider an epidural steroid injection depending on the results of the MRI.   Patient Instructions   You are planning an MRI of your lumbar spine.       Jose Maria Valera MD  [facility]   addendum:            MRI indicates among other findings, multilevel foraminal stenosis, bilaterally, worse on the R at L4-5,and on the L at L2-3 and L3-4.               Phone: d/w pt.            He was able to walk 8 blocks today.                   We discussed an TESS or a spine consult or waiting; he prefers the latter.

## 2017-11-04 NOTE — PROGRESS NOTES

## 2017-11-14 ENCOUNTER — TRANSFERRED RECORDS (OUTPATIENT)
Dept: HEALTH INFORMATION MANAGEMENT | Facility: CLINIC | Age: 82
End: 2017-11-14

## 2017-11-27 ENCOUNTER — TELEPHONE (OUTPATIENT)
Dept: FAMILY MEDICINE | Facility: CLINIC | Age: 82
End: 2017-11-27

## 2017-11-27 DIAGNOSIS — M54.41 CHRONIC MIDLINE LOW BACK PAIN WITH RIGHT-SIDED SCIATICA: Primary | ICD-10-CM

## 2017-11-27 DIAGNOSIS — G89.29 CHRONIC MIDLINE LOW BACK PAIN WITH RIGHT-SIDED SCIATICA: Primary | ICD-10-CM

## 2017-11-27 NOTE — TELEPHONE ENCOUNTER
Reason for call:  Patient reporting a symptom    Symptom or request: thigh pain    Duration (how long have symptoms been present): ongoing    Have you been treated for this before? Yes    Additional comments: Pt saw Dr Valera regarding this issue on November 4.  He would like to discuss further options including the injections Dr Valera recommended.    Phone Number patient can be reached at:  Home number on file [phone] (home)    Best Time:  any    Can we leave a detailed message on this number:  YES    Call taken on 11/27/2017 at 4:20 PM by SIERRA MERINO

## 2017-11-27 NOTE — TELEPHONE ENCOUNTER
Writer did not see MRI report in [facility] as mentioned in the 11-4-17 OV note; patient was called and he states he did get a call with the results from Dr. Jose Maria Valera.    Patient  would like to speak with Dr. Jose Maria Valera re  Epidural steroid injection possibility. He will be at home today until about 6pm. [phone]

## 2017-12-08 ENCOUNTER — OFFICE VISIT (OUTPATIENT)
Dept: NEUROSURGERY | Facility: CLINIC | Age: 82
End: 2017-12-08
Attending: INTERNAL MEDICINE
Payer: COMMERCIAL

## 2017-12-08 VITALS
OXYGEN SATURATION: 98 % | HEIGHT: 67 IN | BODY MASS INDEX: 26.49 KG/M2 | WEIGHT: 168.8 LBS | DIASTOLIC BLOOD PRESSURE: 55 MMHG | SYSTOLIC BLOOD PRESSURE: 104 MMHG | HEART RATE: 66 BPM

## 2017-12-08 DIAGNOSIS — G89.29 CHRONIC MIDLINE LOW BACK PAIN WITH RIGHT-SIDED SCIATICA: ICD-10-CM

## 2017-12-08 DIAGNOSIS — M54.41 CHRONIC MIDLINE LOW BACK PAIN WITH RIGHT-SIDED SCIATICA: ICD-10-CM

## 2017-12-08 PROCEDURE — 99203 OFFICE O/P NEW LOW 30 MIN: CPT | Performed by: NURSE PRACTITIONER

## 2017-12-08 PROCEDURE — 99211 OFF/OP EST MAY X REQ PHY/QHP: CPT | Performed by: NURSE PRACTITIONER

## 2017-12-08 RX ORDER — TAMSULOSIN HYDROCHLORIDE 0.4 MG/1
CAPSULE ORAL DAILY
COMMUNITY
End: 2018-11-12

## 2017-12-08 ASSESSMENT — PAIN SCALES - GENERAL: PAINLEVEL: MILD PAIN (3)

## 2017-12-08 NOTE — LETTER
12/8/2017         RE: Sharif Kumar  [address] [address]        Dear Colleague,    Thank you for referring your patient, Sharif Kumar, to the [facility]UROS[facility]GERY [facility]. Please see a copy of my visit note below.    Dr. En Elder  [facility]  Neurosurgery Clinic Visit        CC: low back and leg pain    Primary care Provider: Jose Maria Valera      Reason For Visit:   I was asked by Dr. Valera to consult on the patient for lumbar radicular pain.      HPI: Sharif Kumar is a 82 year old male with lumbar radicular pain on the right.  He reports that this began about 6 months ago. He notes that the worse pain is in his right lateral thigh to the front of his thigh to the knee.  He states that it is a very deep ache. He notes pain with standing about 10 minutes.  Once he sits down the area is better. He denies any falls or foot drop. He is currently has had PT at  [facility]. He has not had injections for his pain.      Pain at its worst 10  Pain right now:  3    Past Medical History:   Diagnosis Date     Helicobacter pylori (H. pylori)      Hypertrophy (benign) of prostate      Intermittent asthma 3/20/2012     Polyp of gallbladder 3/20/2012     Priapism        Past Medical History reviewed with patient during visit.    History reviewed. No pertinent surgical history.  Past Surgical History reviewed with patient during visit.    Current Outpatient Prescriptions   Medication     triamcinolone (KENALOG) 0.5 % cream     bismuth subsalicylate (PEPTO BISMOL) 262 MG chewable tablet     loratadine (CLARITIN) 10 MG tablet     No current facility-administered medications for this visit.        Allergies   Allergen Reactions     No Known Drug Allergies        Social History     Social History     Marital status: Single     Spouse name:      Number of children: 4     Years of education: N/A     Occupational History      Retired     ; retired  2000     Social History Main Topics     Smoking status: Passive Smoke Exposure - Never Smoker     Types: Cigars     Smokeless tobacco: Never Used      Comment: occasional cigar  maybe once a month     Alcohol use Yes      Comment: OCCASIONAL WINE     Drug use: No     Sexual activity: Yes     Partners: Female     Other Topics Concern     Parent/Sibling W/ Cabg, Mi Or Angioplasty Before 65f 55m? Yes     Social History Narrative    Referred by Dr. Lynn       Family History   Problem Relation Age of Onset     Alzheimer Disease Mother      CEREBROVASCULAR DISEASE Father      DIABETES Brother      Other Cancer Brother      [employer] vet; agent [employer] exposure; wrote a book     Coronary Artery Disease No family hx of      Hypertension No family hx of      Hyperlipidemia No family hx of      Breast Cancer No family hx of      Colon Cancer No family hx of      Prostate Cancer No family hx of      Depression No family hx of      Anxiety Disorder No family hx of      MENTAL ILLNESS No family hx of      Substance Abuse No family hx of      Anesthesia Reaction No family hx of      Asthma No family hx of      OSTEOPOROSIS No family hx of      Genetic Disorder No family hx of      Thyroid Disease No family hx of      Obesity No family hx of      Unknown/Adopted No family hx of          Review Of Systems  Skin: negative  Eyes: negative  Ears/Nose/Throat: negative  Respiratory: No shortness of breath, dyspnea on exertion, cough, or hemoptysis  Cardiovascular: negative  Gastrointestinal: negative  Genitourinary: negative  Musculoskeletal: back pain  Neurologic: right leg numbness  Psychiatric: negative  Hematologic/Lymphatic/Immunologic: negative  Endocrine: negative     ROS: 10 point ROS neg other than the symptoms noted above in the HPI.        Vital Signs: There were no vitals taken for this visit.    Examination:  Constitutional:  Alert, well nourished, NAD.  Memory: recent and remote memory intact  HEENT: Normocephalic,  atraumatic.   Pulm:  Without shortness of breath   CV:  No pitting edema of BLE.    Neurological:  Awake  Alert  Oriented x 3  Speech clear  Cranial nerves II - XII intact  PERRL  EOMI  Face symmetric  Tongue midline  Motor exam   Shoulder Abduction:  Right:  5/5   Left:  5/5  Biceps:                      Right:  5/5   Left:  5/5  Triceps:                     Right:  5/5   Left:  5/5  Wrist Extensors:       Right:  5/5   Left:  5/5  Wrist Flexors:           Right:  5/5   Left:  5/5  Intrinsics:                   Right:  5/5   Left:  5/5   Hip Flexor:                Right: 5/5  Left:  5/5  Hip Adductor:             Right:  5/5  Left:  5/5  Hip Abductor:             Right:  5/5  Left:  5/5  Gastroc Soleus:        Right:  5/5  Left:  5/5  Tib/Ant:                      Right:  5/5  Left:  5/5  EHL:                          Right:  5/5  Left:  5/5   Sensation normal to bilateral upper and lower extremities  Muscle tone to bilateral upper and lower extremities normal   Gait: Able to stand from a seated position. Normal non-antalgic, non-myelopathic gait.  Able to heel/toe walk without loss of balance    Lumbar examination reveals no tenderness of the spine or paraspinous muscles.  Hip height is symmetrical. Negative SI joint, sciatic notch or greater trochanteric tenderness to palpation bilaterally.  Straight leg raise is negative bilaterally.      Imaging:     [facility]-    1.  Multilevel DDD. This is most advanced from the L2 through the L5 level and combines with other factors to cause borderline mild central stenosis at T12-L1, mild central stenosis at L1-2, mild central stenosis at L2-3, moderate central stenosis at L3-4 and borderline mild central stenosis at L4-5.     2.  Multilevel foraminal stenosis bilaterally, most prominent on the left at L2-3 and L3-4 and on the right at L4-5.          Assessment/Plan:   Sharif Kumar is a 82 year old male with lumbar radicular pain on the right.  He reports  that this began about 6 months ago. He notes that the worse pain is in his right lateral thigh to the front of his thigh to the knee.  He states that it is a very deep ache. He notes pain with standing about 10 minutes.  Once he sits down the area is better. He denies any falls or foot drop. He is currently has had PT at  [facility]. He has not had injections for his pain.  The pt states that he can walk but it is very painful at times.  His MRI was reviewed in detail. He does have disc herniations at L3-4 and L4-5.  It was recommended that he try injection therapy to see if this correlates with the pain.  The injection was explained in detail.  He would like to proceed.       Patient Instructions   1.  Please call [phone] to schedule or change your injection.  I will contact you with the results.    2.  Please contact the clinic if pain persists at [phone].                Jessica Delgado CNP  [facility]  [address] [address]    Tel [phone]  Pager [phone]      Again, thank you for allowing me to participate in the care of your patient.        Sincerely,        ALLEY Frias CNP

## 2017-12-08 NOTE — MR AVS SNAPSHOT
After Visit Summary   12/8/2017    Sharif Kumar    MRN: [id number]           Patient Information     Date Of Birth          1935        Visit Information        Provider Department      12/8/2017 11:40 AM Jessica Delgado APRN CNP [facility]        Today's Diagnoses     Chronic midline low back pain with right-sided sciatica          Care Instructions    1.  Please call [phone] to schedule or change your injection.  I will contact you with the results.    2.  Please contact the clinic if pain persists at [phone].               Follow-ups after your visit        Future tests that were ordered for you today     Open Future Orders        Priority Expected Expires Ordered    XR Lumbar Transforaminal Inj Single Routine 12/8/2017 12/8/2018 12/8/2017            Who to contact     If you have questions or need follow up information about today's clinic visit or your schedule please contact [facility] NEUROSURGERY [facility] directly at [phone].  Normal or non-critical lab and imaging results will be communicated to you by Hickieshart, letter or phone within 4 business days after the clinic has received the results. If you do not hear from us within 7 days, please contact the clinic through Zhuh[employer] OmeSoftt or phone. If you have a critical or abnormal lab result, we will notify you by phone as soon as possible.  Submit refill requests through Seriously or call your pharmacy and they will forward the refill request to us. Please allow 3 business days for your refill to be completed.          Additional Information About Your Visit        Hickieshart Information     Seriously gives you secure access to your electronic health record. If you see a primary care provider, you can also send messages to your care team and make appointments. If you have questions, please call your primary care clinic.  If you do not have a primary care provider, please call [phone] and they will  "assist you.        Care EveryWhere ID     This is your Care EveryWhere ID. This could be used by other organizations to access your [facility] medical records  [id number]        Your Vitals Were     Pulse Height Pulse Oximetry BMI (Body Mass Index)          66 5' 6.53\" (1.69 m) 98% 26.81 kg/m2         Blood Pressure from Last 3 Encounters:   12/08/17 104/55   11/04/17 98/50   04/25/17 126/60    Weight from Last 3 Encounters:   12/08/17 168 lb 12.8 oz (76.6 kg)   11/04/17 166 lb (75.3 kg)   04/25/17 175 lb (79.4 kg)               Primary Care Provider Office Phone # Fax #    Jose Maria Valera [phone] [address] [facility] [place] [address]        Equal Access to Services     [facility]: Hadii aad ku hadasho Soomaali, waaxda luqadaha, qaybta kaalmada adeegyada, waxay idiin hayaan adejacque triplett . So [facility] [phone].    ATENCIÓN: Si habla español, tiene a woody disposición servicios gratuitos de asistencia lingüística. Llame al [phone].    We comply with applicable federal civil rights laws and [place] laws. We do not discriminate on the basis of race, color, national origin, age, disability, sex, sexual orientation, or gender identity.            Thank you!     Thank you for choosing [facility] NEUROSURGERY [facility]  for your care. Our goal is always to provide you with excellent care. Hearing back from our patients is one way we can continue to improve our services. Please take a few minutes to complete the written survey that you may receive in the mail after your visit with us. Thank you!             Your Updated Medication List - Protect others around you: Learn how to safely use, store and throw away your medicines at www.disposemymeds.org.          This list is accurate as of: 12/8/17 11:59 AM.  Always use your most recent med list.                   Brand Name Dispense Instructions for use Diagnosis    bismuth subsalicylate 262 MG chewable tablet    PEPTO " BISMOL     Take 2 tablets by mouth daily as needed.        FLOMAX 0.4 MG capsule   Generic drug:  tamsulosin      Take by mouth daily        loratadine 10 MG tablet    CLARITIN    30 tablet    Take 1 tablet by mouth daily.    Chronic rhinitis       triamcinolone 0.5 % cream    KENALOG    30 g    Apply sparingly to affected area three times daily.    Dermatitis

## 2017-12-08 NOTE — PROGRESS NOTES
Dr. En Elder  [facility]  Neurosurgery Clinic Visit        CC: low back and leg pain    Primary care Provider: Jose Maria Valera      Reason For Visit:   I was asked by Dr. Valera to consult on the patient for lumbar radicular pain.      HPI: Sharif Kumar is a 82 year old male with lumbar radicular pain on the right.  He reports that this began about 6 months ago. He notes that the worse pain is in his right lateral thigh to the front of his thigh to the knee.  He states that it is a very deep ache. He notes pain with standing about 10 minutes.  Once he sits down the area is better. He denies any falls or foot drop. He is currently has had PT at  [facility]. He has not had injections for his pain.      Pain at its worst 10  Pain right now:  3    Past Medical History:   Diagnosis Date     Helicobacter pylori (H. pylori)      Hypertrophy (benign) of prostate      Intermittent asthma 3/20/2012     Polyp of gallbladder 3/20/2012     Priapism        Past Medical History reviewed with patient during visit.    History reviewed. No pertinent surgical history.  Past Surgical History reviewed with patient during visit.    Current Outpatient Prescriptions   Medication     triamcinolone (KENALOG) 0.5 % cream     bismuth subsalicylate (PEPTO BISMOL) 262 MG chewable tablet     loratadine (CLARITIN) 10 MG tablet     No current facility-administered medications for this visit.        Allergies   Allergen Reactions     No Known Drug Allergies        Social History     Social History     Marital status: Single     Spouse name:      Number of children: 4     Years of education: N/A     Occupational History      Retired     ; retired 2000     Social History Main Topics     Smoking status: Passive Smoke Exposure - Never Smoker     Types: Cigars     Smokeless tobacco: Never Used      Comment: occasional cigar  maybe once a month     Alcohol use Yes      Comment: OCCASIONAL WINE     Drug use: No      Sexual activity: Yes     Partners: Female     Other Topics Concern     Parent/Sibling W/ Cabg, Mi Or Angioplasty Before 65f 55m? Yes     Social History Narrative    Referred by Dr. Lynn       Family History   Problem Relation Age of Onset     Alzheimer Disease Mother      CEREBROVASCULAR DISEASE Father      DIABETES Brother      Other Cancer Brother      [employer] vet; agent Orange exposure; wrote a book     Coronary Artery Disease No family hx of      Hypertension No family hx of      Hyperlipidemia No family hx of      Breast Cancer No family hx of      Colon Cancer No family hx of      Prostate Cancer No family hx of      Depression No family hx of      Anxiety Disorder No family hx of      MENTAL ILLNESS No family hx of      Substance Abuse No family hx of      Anesthesia Reaction No family hx of      Asthma No family hx of      OSTEOPOROSIS No family hx of      Genetic Disorder No family hx of      Thyroid Disease No family hx of      Obesity No family hx of      Unknown/Adopted No family hx of          Review Of Systems  Skin: negative  Eyes: negative  Ears/Nose/Throat: negative  Respiratory: No shortness of breath, dyspnea on exertion, cough, or hemoptysis  Cardiovascular: negative  Gastrointestinal: negative  Genitourinary: negative  Musculoskeletal: back pain  Neurologic: right leg numbness  Psychiatric: negative  Hematologic/Lymphatic/Immunologic: negative  Endocrine: negative     ROS: 10 point ROS neg other than the symptoms noted above in the HPI.        Vital Signs: There were no vitals taken for this visit.    Examination:  Constitutional:  Alert, well nourished, NAD.  Memory: recent and remote memory intact  HEENT: Normocephalic, atraumatic.   Pulm:  Without shortness of breath   CV:  No pitting edema of BLE.    Neurological:  Awake  Alert  Oriented x 3  Speech clear  Cranial nerves II - XII intact  PERRL  EOMI  Face symmetric  Tongue midline  Motor exam   Shoulder Abduction:  Right:  5/5   Left:   5/5  Biceps:                      Right:  5/5   Left:  5/5  Triceps:                     Right:  5/5   Left:  5/5  Wrist Extensors:       Right:  5/5   Left:  5/5  Wrist Flexors:           Right:  5/5   Left:  5/5  Intrinsics:                   Right:  5/5   Left:  5/5   Hip Flexor:                Right: 5/5  Left:  5/5  Hip Adductor:             Right:  5/5  Left:  5/5  Hip Abductor:             Right:  5/5  Left:  5/5  Gastroc Soleus:        Right:  5/5  Left:  5/5  Tib/Ant:                      Right:  5/5  Left:  5/5  EHL:                          Right:  5/5  Left:  5/5   Sensation normal to bilateral upper and lower extremities  Muscle tone to bilateral upper and lower extremities normal   Gait: Able to stand from a seated position. Normal non-antalgic, non-myelopathic gait.  Able to heel/toe walk without loss of balance    Lumbar examination reveals no tenderness of the spine or paraspinous muscles.  Hip height is symmetrical. Negative SI joint, sciatic notch or greater trochanteric tenderness to palpation bilaterally.  Straight leg raise is negative bilaterally.      Imaging:     [facility]    1.  Multilevel DDD. This is most advanced from the L2 through the L5 level and combines with other factors to cause borderline mild central stenosis at T12-L1, mild central stenosis at L1-2, mild central stenosis at L2-3, moderate central stenosis at L3-4 and borderline mild central stenosis at L4-5.     2.  Multilevel foraminal stenosis bilaterally, most prominent on the left at L2-3 and L3-4 and on the right at L4-5.          Assessment/Plan:   Sharif Kumar is a 82 year old male with lumbar radicular pain on the right.  He reports that this began about 6 months ago. He notes that the worse pain is in his right lateral thigh to the front of his thigh to the knee.  He states that it is a very deep ache. He notes pain with standing about 10 minutes.  Once he sits down the area is better. He denies any  falls or foot drop. He is currently has had PT at  [facility]. He has not had injections for his pain.  The pt states that he can walk but it is very painful at times.  His MRI was reviewed in detail. He does have disc herniations at L3-4 and L4-5.  It was recommended that he try injection therapy to see if this correlates with the pain.  The injection was explained in detail.  He would like to proceed.       Patient Instructions   1.  Please call [phone] to schedule or change your injection.  I will contact you with the results.    2.  Please contact the clinic if pain persists at [phone].                Jessica Delgado [facility]  Spine [facility] Brain Clinic  [address] [address]    Tel [phone]  Pager [phone]

## 2017-12-08 NOTE — NURSING NOTE
"Sharif Kumar is a 82 year old male who presents for:  Chief Complaint   Patient presents with     Neurologic Problem     Low back pain with right sided sciatica, right thigh pain         Initial Vitals:  /55 (BP Location: Right arm, Patient Position: Sitting, Cuff Size: Adult Large)  Pulse 66  Ht 5' 6.53\" (1.69 m)  Wt 168 lb 12.8 oz (76.6 kg)  SpO2 98%  BMI 26.81 kg/m2 Estimated body mass index is 26.81 kg/(m^2) as calculated from the following:    Height as of this encounter: 5' 6.53\" (1.69 m).    Weight as of this encounter: 168 lb 12.8 oz (76.6 kg).. Body surface area is 1.9 meters squared. BP completed using cuff size: large  Mild Pain (3)    Do you feel safe in your environment?  Yes  Do you need any refills today? No    Nursing Comments: Low back pain with right sided sciatica, right thigh pain.        5 min. nursing intake time  Cailin Donnelly MA       Discharge plan: see providers dictation  2 min. nursing discharge time  Cailin Donnelly MA        "

## 2017-12-08 NOTE — PATIENT INSTRUCTIONS
1.  Please call [phone] to schedule or change your injection.  I will contact you with the results.    2.  Please contact the clinic if pain persists at [phone].

## 2017-12-11 ENCOUNTER — HOSPITAL ENCOUNTER (OUTPATIENT)
Dept: GENERAL RADIOLOGY | Facility: CLINIC | Age: 82
End: 2017-12-11
Attending: NURSE PRACTITIONER | Admitting: FAMILY MEDICINE
Payer: MEDICARE

## 2017-12-11 ENCOUNTER — HOSPITAL ENCOUNTER (OUTPATIENT)
Facility: CLINIC | Age: 82
Discharge: HOME | End: 2017-12-11
Attending: FAMILY MEDICINE | Admitting: FAMILY MEDICINE
Payer: MEDICARE

## 2017-12-11 VITALS
SYSTOLIC BLOOD PRESSURE: 123 MMHG | HEIGHT: 69 IN | HEART RATE: 65 BPM | OXYGEN SATURATION: 96 % | TEMPERATURE: 97.8 F | BODY MASS INDEX: 25.18 KG/M2 | RESPIRATION RATE: 16 BRPM | DIASTOLIC BLOOD PRESSURE: 46 MMHG | WEIGHT: 170 LBS

## 2017-12-11 DIAGNOSIS — G89.29 CHRONIC MIDLINE LOW BACK PAIN WITH RIGHT-SIDED SCIATICA: ICD-10-CM

## 2017-12-11 DIAGNOSIS — M54.41 CHRONIC MIDLINE LOW BACK PAIN WITH RIGHT-SIDED SCIATICA: ICD-10-CM

## 2017-12-11 PROCEDURE — 25000125 ZZHC RX 250: Performed by: PHYSICIAN ASSISTANT

## 2017-12-11 PROCEDURE — 40000863 ZZH STATISTIC RADIOLOGY XRAY, US, CT, MAR, NM

## 2017-12-11 PROCEDURE — 27211111 XR LUMBAR TRANSFORAMINAL INJ SINGLE

## 2017-12-11 PROCEDURE — 25000128 H RX IP 250 OP 636: Performed by: PHYSICIAN ASSISTANT

## 2017-12-11 PROCEDURE — 25500064 ZZH RX 255 OP 636: Performed by: PHYSICIAN ASSISTANT

## 2017-12-11 RX ORDER — IOPAMIDOL 408 MG/ML
10 INJECTION, SOLUTION INTRATHECAL ONCE
Status: COMPLETED | OUTPATIENT
Start: 2017-12-11 | End: 2017-12-11

## 2017-12-11 RX ORDER — LIDOCAINE HYDROCHLORIDE 10 MG/ML
30 INJECTION, SOLUTION EPIDURAL; INFILTRATION; INTRACAUDAL; PERINEURAL ONCE
Status: COMPLETED | OUTPATIENT
Start: 2017-12-11 | End: 2017-12-11

## 2017-12-11 RX ORDER — DEXAMETHASONE SODIUM PHOSPHATE 10 MG/ML
20 INJECTION, SOLUTION INTRAMUSCULAR; INTRAVENOUS ONCE
Status: COMPLETED | OUTPATIENT
Start: 2017-12-11 | End: 2017-12-11

## 2017-12-11 RX ADMIN — DEXAMETHASONE SODIUM PHOSPHATE 20 MG: 10 INJECTION, SOLUTION INTRAMUSCULAR; INTRAVENOUS at 14:17

## 2017-12-11 RX ADMIN — IOPAMIDOL 10 ML: 408 INJECTION, SOLUTION INTRATHECAL at 14:06

## 2017-12-11 RX ADMIN — LIDOCAINE HYDROCHLORIDE 9 MG: 10 INJECTION, SOLUTION EPIDURAL; INFILTRATION; INTRACAUDAL; PERINEURAL at 14:18

## 2017-12-11 NOTE — DISCHARGE INSTRUCTIONS
Steroid Injection Discharge Instructions     After you go home:      You may resume your normal diet.    Care of Puncture Site:      If you have a bandaid on your puncture site, you may remove it the next morning    You may shower tomorrow    No bath tubs, whirlpools or swimming for at least 3 days     Activity:      You may go back to normal activity in 24 hours    You should let pain be your guide as to the extent of your activities    Maintain any activity limitations as ordered by your provider    Do NOT drive a vehicle until tomorrow    Medicines:      You may resume all medications    Resume your Warfarin/Coumadin at your regular dose today. Follow up with your provider to have your INR rechecked    Resume your Platelet Inhibitors and Aspirin tomorrow at your regular dose    For minor pain, you may take Acetaminophen (Tylenol) or Ibuprofen (Advil)    Pain:       You may experience increased or different pain over the next 24-48 hours    For the next 48 hrs - you may use ice packs for discomfort     Call your primary care doctor if:      You have severe pain that does not improve with pain medication    You have chills or a fever greater than 101 F (38 C)    The site is red, swollen, hot or tender    New problems with your bowel or bladder    Any questions or concerns    Other Instructions:      New numbness down your leg post injection is temporary and may last for up to 6 hours. You may need assistance with activity until your leg has normal sensation.    If you are diabetic, monitor your blood sugar closely. Contact the provider who manages your diabetes to help you control your blood sugar if needed.    For Your Information:      A steroid was injected to help decrease swelling and may help to reduce pain. It may take up to 7-10 days to obtain full results.    Some patients will get lasting relief from a single injection. Others may require up to 3 injections to get results. If you have more than one  steroid injection, they should be given 2 weeks apart.    Side effects of your steroid injection are mild and will go away in 2-3 days  - Insomnia  - Heartburn  - Flushed face  - Water retention  - Increased appetite  - Increased blood sugar      If you have questions call:        [facility] Radiology Dept @ [phone]      The provider who performed your procedure was _________________.

## 2017-12-11 NOTE — PROGRESS NOTES
Return to CS 9 at 1420, denies any new pain or N&T in legs.  VSS.  Juice and crackers given.  D/c instr were given pre procedure.  Pt and wife state understanding.  2 band aids to site CDI.  No diff standing or ambulating in room.

## 2017-12-11 NOTE — IP AVS SNAPSHOT
[place] [address]    [place] [address]    Phone:  [phone]                                       After Visit Summary   12/11/2017    Sharif Kumar    MRN: [id number]           After Visit Summary Signature Page     I have received my discharge instructions, and my questions have been answered. I have discussed any challenges I see with this plan with the nurse or doctor.    ..........................................................................................................................................  Patient/Patient Representative Signature      ..........................................................................................................................................  Patient Representative Print Name and Relationship to Patient    ..................................................               ................................................  Date                                            Time    ..........................................................................................................................................  Reviewed by Signature/Title    ...................................................              ..............................................  Date                                                            Time

## 2017-12-11 NOTE — IP AVS SNAPSHOT
MRN:[id number]                      After Visit Summary   12/11/2017    Sharif Kumar    MRN: [id number]           Visit Information        Department      12/11/2017  1:00 PM [facility]          Review of your medicines      UNREVIEWED medicines. Ask your doctor about these medicines        Dose / Directions    bismuth subsalicylate 262 MG chewable tablet   Commonly known as:  PEPTO BISMOL        Dose:  524 mg   Take 2 tablets by mouth daily as needed.   Refills:  0       FLOMAX 0.4 MG capsule   Generic drug:  tamsulosin        Take by mouth daily   Refills:  0       loratadine 10 MG tablet   Commonly known as:  CLARITIN   Used for:  Chronic rhinitis        Dose:  10 mg   Take 1 tablet by mouth daily.   Quantity:  30 tablet   Refills:  1       LUTEIN PO        Dose:  40 mg   Take 40 mg by mouth daily   Refills:  0       triamcinolone 0.5 % cream   Commonly known as:  KENALOG   Used for:  Dermatitis        Apply sparingly to affected area three times daily.   Quantity:  30 g   Refills:  0                Protect others around you: Learn how to safely use, store and throw away your medicines at www.disposemymeds.org.         Follow-ups after your visit         Care Instructions        Further instructions from your care team       Steroid Injection Discharge Instructions     After you go home:      You may resume your normal diet.    Care of Puncture Site:      If you have a bandaid on your puncture site, you may remove it the next morning    You may shower tomorrow    No bath tubs, whirlpools or swimming for at least 3 days     Activity:      You may go back to normal activity in 24 hours    You should let pain be your guide as to the extent of your activities    Maintain any activity limitations as ordered by your provider    Do NOT drive a vehicle until tomorrow    Medicines:      You may resume all medications    Resume your Warfarin/Coumadin at your regular dose today. Follow  up with your provider to have your INR rechecked    Resume your Platelet Inhibitors and Aspirin tomorrow at your regular dose    For minor pain, you may take Acetaminophen (Tylenol) or Ibuprofen (Advil)    Pain:       You may experience increased or different pain over the next 24-48 hours    For the next 48 hrs - you may use ice packs for discomfort     Call your primary care doctor if:      You have severe pain that does not improve with pain medication    You have chills or a fever greater than 101 F (38 C)    The site is red, swollen, hot or tender    New problems with your bowel or bladder    Any questions or concerns    Other Instructions:      New numbness down your leg post injection is temporary and may last for up to 6 hours. You may need assistance with activity until your leg has normal sensation.    If you are diabetic, monitor your blood sugar closely. Contact the provider who manages your diabetes to help you control your blood sugar if needed.    For Your Information:      A steroid was injected to help decrease swelling and may help to reduce pain. It may take up to 7-10 days to obtain full results.    Some patients will get lasting relief from a single injection. Others may require up to 3 injections to get results. If you have more than one steroid injection, they should be given 2 weeks apart.    Side effects of your steroid injection are mild and will go away in 2-3 days  - Insomnia  - Heartburn  - Flushed face  - Water retention  - Increased appetite  - Increased blood sugar      If you have questions call:        [facility] Radiology Dept @ [phone]      The provider who performed your procedure was _________________.         Additional Information About Your Visit        [employer]ConnectionPlus Information     uBeam gives you secure access to your electronic health record. If you see a primary care provider, you can also send messages to your care team and make appointments. If you have  "questions, please call your primary care clinic.  If you do not have a primary care provider, please call [phone] and they will assist you.        Care EveryWhere ID     This is your Care EveryWhere ID. This could be used by other organizations to access your [facility] medical records  [id number]        Your Vitals Were     Blood Pressure Pulse Temperature Respirations Height Weight    123/46 (BP Location: Right arm) 65 97.8  F (36.6  C) (Oral) 16 1.745 m (5' 8.7\") 77.1 kg (170 lb)    Pulse Oximetry BMI (Body Mass Index)                96% 25.32 kg/m2           Primary Care Provider Office Phone # Fax #    Jose Maria Valera [phone]      Equal Access to Services     [facility] : Hadii liang pelayo hadasho Soomaali, waaxda luqadaha, qaybta kaalmada adeegyada, tae triplett . So [facility] [phone].    ATENCIÓN: Si habla español, tiene a woody disposición servicios gratuitos de asistencia lingüística. Llame al [phone].    We comply with applicable federal civil rights laws and [place] laws. We do not discriminate on the basis of race, color, national origin, age, disability, sex, sexual orientation, or gender identity.            Thank you!     Thank you for choosing [facility] for your care. Our goal is always to provide you with excellent care. Hearing back from our patients is one way we can continue to improve our services. Please take a few minutes to complete the written survey that you may receive in the mail after you visit with us. Thank you!             Medication List: This is a list of all your medications and when to take them. Check marks below indicate your daily home schedule. Keep this list as a reference.      Medications           Morning Afternoon Evening Bedtime As Needed    bismuth subsalicylate 262 MG chewable tablet   Commonly known as:  PEPTO BISMOL   Take 2 tablets by mouth daily as needed.                                FLOMAX 0.4 MG capsule "   Take by mouth daily   Generic drug:  tamsulosin                                loratadine 10 MG tablet   Commonly known as:  CLARITIN   Take 1 tablet by mouth daily.                                LUTEIN PO   Take 40 mg by mouth daily                                triamcinolone 0.5 % cream   Commonly known as:  KENALOG   Apply sparingly to affected area three times daily.

## 2017-12-20 ENCOUNTER — OFFICE VISIT (OUTPATIENT)
Dept: FAMILY MEDICINE | Facility: CLINIC | Age: 82
End: 2017-12-20
Payer: COMMERCIAL

## 2017-12-20 VITALS
OXYGEN SATURATION: 97 % | HEART RATE: 76 BPM | DIASTOLIC BLOOD PRESSURE: 60 MMHG | BODY MASS INDEX: 25.46 KG/M2 | HEIGHT: 68 IN | SYSTOLIC BLOOD PRESSURE: 126 MMHG | RESPIRATION RATE: 20 BRPM | WEIGHT: 168 LBS | TEMPERATURE: 99.1 F

## 2017-12-20 DIAGNOSIS — M54.41 CHRONIC MIDLINE LOW BACK PAIN WITH RIGHT-SIDED SCIATICA: ICD-10-CM

## 2017-12-20 DIAGNOSIS — G89.29 CHRONIC MIDLINE LOW BACK PAIN WITH RIGHT-SIDED SCIATICA: ICD-10-CM

## 2017-12-20 DIAGNOSIS — J32.9 CHRONIC SINUSITIS, UNSPECIFIED LOCATION: Primary | ICD-10-CM

## 2017-12-20 PROCEDURE — 99213 OFFICE O/P EST LOW 20 MIN: CPT | Performed by: INTERNAL MEDICINE

## 2017-12-20 NOTE — PATIENT INSTRUCTIONS
Return to the neurosurgery clinic.                       Consider another injection, and/or acupuncture, or a TENS unit.                                           Take the augmentin twice per day for 10 days.                  Let me know if your symptoms continue.

## 2017-12-20 NOTE — PROGRESS NOTES
SUBJECTIVE:   Sharif Kumar is a 82 year old male who presents to clinic today for the following health issues:      Acute Illness   Acute illness concerns: sinus issue  Onset: 6 months    Fever: no     Chills/Sweats: no     Headache (location?): no     Sinus Pressure:YES    Conjunctivitis:  no    Ear Pain: YES- -both    Rhinorrhea: YES    Congestion: YES- nasal    Sore Throat: no      Cough: no    Wheeze: no     Decreased Appetite: no     Nausea: no     Vomiting: no     Diarrhea:  no     Dysuria/Freq.: no     Fatigue/Achiness: no     Sick/Strep Exposure: no      Therapies Tried and outcome: Flonase and Claritin too effective        Also discuss injection in back that had done 1 week ago?    Problem list and histories reviewed & adjusted, as indicated.                   Ongoing sinus type sx;6 months or more. Wants to try an antibiotic.                                                                     Lumbar TESS helped for a day or two only.                                                     Current Outpatient Prescriptions   Medication Sig Dispense Refill     LUTEIN PO Take 40 mg by mouth daily       tamsulosin (FLOMAX) 0.4 MG capsule Take by mouth daily       triamcinolone (KENALOG) 0.5 % cream Apply sparingly to affected area three times daily. 30 g 0     bismuth subsalicylate (PEPTO BISMOL) 262 MG chewable tablet Take 2 tablets by mouth daily as needed.       loratadine (CLARITIN) 10 MG tablet Take 1 tablet by mouth daily. 30 tablet 1     Allergies   Allergen Reactions     No Known Drug Allergies      BP Readings from Last 3 Encounters:   12/11/17 123/46   12/08/17 104/55   11/04/17 98/50    Wt Readings from Last 3 Encounters:   12/11/17 170 lb (77.1 kg)   12/08/17 168 lb 12.8 oz (76.6 kg)   11/04/17 166 lb (75.3 kg)                      Reviewed and updated as needed this visit by clinical staff     Reviewed and updated as needed this visit by Provider         ROS:  CONSTITUTIONAL:NEGATIVE for fever,  "chills, change in weight  ENT/MOUTH: POSITIVE for postnasal drainage and sinus pressure  NEURO: POSITIVE for radicular pain R leg and he mentions some memory issues today for the first time    OBJECTIVE:                                                    /60 (BP Location: Right arm, Patient Position: Chair, Cuff Size: Adult Large)  Pulse 76  Temp 99.1  F (37.3  C)  Resp 20  Ht 5' 8\" (1.727 m)  Wt 168 lb (76.2 kg)  SpO2 97%  BMI 25.54 kg/m2  There is no height or weight on file to calculate BMI.  GENERAL APPEARANCE: alert and no distress  HENT: maxillary sinus tenderness mild  NEURO: Normal strength and tone    Diagnostic test results:  none        ASSESSMENT/PLAN:                                                        ICD-10-CM    1. Chronic sinusitis, unspecified location J32.9 amoxicillin-clavulanate (AUGMENTIN) 875-125 MG per tablet   2. 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                       Consider another injection, and/or acupuncture, or a TENS unit.                                           Take the augmentin twice per day for 10 days.                  Let me know if your symptoms continue.     Consider a CT of the sinuses.           Jose Maria Valera MD  [facility]  "

## 2017-12-20 NOTE — NURSING NOTE
"Chief Complaint   Patient presents with     Sinus Problem       Initial /60 (BP Location: Right arm, Patient Position: Chair, Cuff Size: Adult Large)  Pulse 76  Temp 99.1  F (37.3  C)  Resp 20  Ht 5' 8\" (1.727 m)  Wt 168 lb (76.2 kg)  SpO2 97%  BMI 25.54 kg/m2 Estimated body mass index is 25.54 kg/(m^2) as calculated from the following:    Height as of this encounter: 5' 8\" (1.727 m).    Weight as of this encounter: 168 lb (76.2 kg).  Medication Reconciliation: complete   Jhoana Perez LPN  "

## 2017-12-20 NOTE — MR AVS SNAPSHOT
After Visit Summary   12/20/2017    Sharif Kumar    MRN: [id number]           Patient Information     Date Of Birth          1935        Visit Information        Provider Department      12/20/2017 4:45 PM Jose Maria Valera MD [facility]        Today's Diagnoses     Chronic sinusitis, unspecified location    -  1    Chronic midline low back pain with right-sided sciatica          Care Instructions    Return to the neurosurgery clinic.                       Consider another injection, and/or acupuncture, or a TENS unit.                                           Take the augmentin twice per day for 10 days.                  Let me know if your symptoms continue.           Follow-ups after your visit        Who to contact     If you have questions or need follow up information about today's clinic visit or your schedule please contact [facility] directly at [phone].  Normal or non-critical lab and imaging results will be communicated to you by [employer], letter or phone within 4 business days after the clinic has received the results. If you do not hear from us within 7 days, please contact the clinic through [employer] or phone. If you have a critical or abnormal lab result, we will notify you by phone as soon as possible.  Submit refill requests through [employer] or call your pharmacy and they will forward the refill request to us. Please allow 3 business days for your refill to be completed.          Additional Information About Your Visit        [employer] Information     [employer] gives you secure access to your electronic health record. If you see a primary care provider, you can also send messages to your care team and make appointments. If you have questions, please call your primary care clinic.  If you do not have a primary care provider, please call [phone] and they will assist you.        Care EveryWhere ID     This is your Care  "EveryWhere ID. This could be used by other organizations to access your [place] medical records  [id number]        Your Vitals Were     Pulse Temperature Respirations Height Pulse Oximetry BMI (Body Mass Index)    76 99.1  F (37.3  C) 20 5' 8\" (1.727 m) 97% 25.54 kg/m2       Blood Pressure from Last 3 Encounters:   12/20/17 126/60   12/11/17 123/46   12/08/17 104/55    Weight from Last 3 Encounters:   12/20/17 168 lb (76.2 kg)   12/11/17 170 lb (77.1 kg)   12/08/17 168 lb 12.8 oz (76.6 kg)              Today, you had the following     No orders found for display         Today's Medication Changes          These changes are accurate as of: 12/20/17  5:13 PM.  If you have any questions, ask your nurse or doctor.               Start taking these medicines.        Dose/Directions    amoxicillin-clavulanate 875-125 MG per tablet   Commonly known as:  AUGMENTIN   Used for:  Chronic sinusitis, unspecified location   Started by:  Jose Maria Valera MD        Dose:  1 tablet   Take 1 tablet by mouth 2 times daily   Quantity:  20 tablet   Refills:  0            Where to get your medicines      These medications were sent to [facility] [address] [place] [address] [address] [facility] [address] [facility] [address]     Phone:  [phone]     amoxicillin-clavulanate 875-125 MG per tablet                Primary Care Provider Office Phone # Fax #    Jose Maria Valera [phone] [address]  [facility] [address]        Equal Access to Services     [facility] [facility] [facility]: Hadii liang Conner, waaxda luqadaha, qaybta kaalmada adeegyada, tae lemons. So [facility] [phone].    ATENCIÓN: Si habla español, tiene a woody disposición servicios gratuitos de asistencia lingüística. Llame al [phone].    We comply with applicable federal civil rights laws and [place] laws. We do not discriminate on the basis of race, color, " national origin, age, disability, sex, sexual orientation, or gender identity.            Thank you!     Thank you for choosing [facility]  for your care. Our goal is always to provide you with excellent care. Hearing back from our patients is one way we can continue to improve our services. Please take a few minutes to complete the written survey that you may receive in the mail after your visit with us. Thank you!             Your Updated Medication List - Protect others around you: Learn how to safely use, store and throw away your medicines at www.disposemymeds.org.          This list is accurate as of: 12/20/17  5:13 PM.  Always use your most recent med list.                   Brand Name Dispense Instructions for use Diagnosis    amoxicillin-clavulanate 875-125 MG per tablet    AUGMENTIN    20 tablet    Take 1 tablet by mouth 2 times daily    Chronic sinusitis, unspecified location       bismuth subsalicylate 262 MG chewable tablet    PEPTO BISMOL     Take 2 tablets by mouth daily as needed.        FLOMAX 0.4 MG capsule   Generic drug:  tamsulosin      Take by mouth daily        loratadine 10 MG tablet    CLARITIN    30 tablet    Take 1 tablet by mouth daily.    Chronic rhinitis       LUTEIN PO      Take 40 mg by mouth daily        triamcinolone 0.5 % cream    KENALOG    30 g    Apply sparingly to affected area three times daily.    Dermatitis

## 2017-12-21 ENCOUNTER — TELEPHONE (OUTPATIENT)
Dept: NEUROSURGERY | Facility: CLINIC | Age: 82
End: 2017-12-21

## 2017-12-21 NOTE — TELEPHONE ENCOUNTER
Spoke to Sharif regarding his TESS that he had on 12/11 in [place].  He states that he did feel some relief for the first few days after injection.  However after about 2-3 days symptoms resumed to the point that they were prior to injection.  It will be 2 weeks post injection on Monday 12/25.  He was speaking to his general practitioner who discuss acupuncture and or chiropractic care and if this would be recommended.  Richard has had PT in the past although none currently.  ([facility]).   Will forward to care team for further recommendations.

## 2017-12-21 NOTE — TELEPHONE ENCOUNTER
REASON FOR CALL:  Patient had an injection on 12/11 and he is wondering what his next steps should be.     Detailed message can be left:  Yes

## 2017-12-22 NOTE — TELEPHONE ENCOUNTER
"Reviewed with NGHIA Rodriguez.  OK to try acupuncture or chiropractic care w/o manipulation.  This information was relayed to Richard.  He will be 2 weeks post injection on Monday 12/25.  He had been \"taking it easy\" today and does feel somewhat improved.  He will call with an update again mid week next week or sooner with any acute changes.    "

## 2017-12-27 ENCOUNTER — TELEPHONE (OUTPATIENT)
Dept: NEUROSURGERY | Facility: CLINIC | Age: 82
End: 2017-12-27

## 2017-12-27 DIAGNOSIS — M54.10 RADICULAR PAIN OF RIGHT LOWER EXTREMITY: Primary | ICD-10-CM

## 2017-12-27 NOTE — TELEPHONE ENCOUNTER
Pt called to update about his ongoing pain despite his injection over 2 wks ago. Would like a call back to discuss. Ok to leave detailed message.

## 2017-12-28 NOTE — TELEPHONE ENCOUNTER
"Spoke to Sharif via phone regarding persistent symptom/back pain.  He did undergo his first TESS on 12/11.  He reports that he felt relief for about 24 hours immediately after injection and then symptoms resumed.  During the time immediately after injection when he was feeling better he was at a [employer] party and doing some dancing and feels that he may have aggravated his back at that time.  He continues to feel low back pain radiating to the right thigh.  He reports pain as deep aching - this is worse when standing he rates this at \"8\".  At best pain is rated a \"2\" when sitting or taking it easy.  He takes tylenol daily for pain with minimal relief.  Sharif denies other symptoms such as foot drop.  Will discuss with his care team and reach out to him again with treatment plan/further recommendations.      Imaging:      [facility] 11-     1.  Multilevel DDD. This is most advanced from the L2 through the L5 level and combines with other factors to cause borderline mild central stenosis at T12-L1, mild central stenosis at L1-2, mild central stenosis at L2-3, moderate central stenosis at L3-4 and borderline mild central stenosis at L4-5.      2.  Multilevel foraminal stenosis bilaterally, most prominent on the left at L2-3 and L3-4 and on the right at L4-5  "

## 2018-01-05 ENCOUNTER — TELEPHONE (OUTPATIENT)
Dept: NEUROSURGERY | Facility: CLINIC | Age: 83
End: 2018-01-05

## 2018-01-05 DIAGNOSIS — M54.16 LUMBAR RADICULAR PAIN: Primary | ICD-10-CM

## 2018-01-05 NOTE — TELEPHONE ENCOUNTER
REASON FOR CALL:  Pt called; states he would like his injection done at [facility], same as his last injection. Also wants to discuss questions regarding 2nd injection in addition to discussing acupuncture.   Detailed message can be left:  YES

## 2018-01-05 NOTE — TELEPHONE ENCOUNTER
Spoke to patient. Patient wants TESS at [facility]. New order placed in [facility]. Patient was given number to call central scheduling. Patient had no further questions or concerns.

## 2018-01-22 ENCOUNTER — HOSPITAL ENCOUNTER (OUTPATIENT)
Dept: GENERAL RADIOLOGY | Facility: CLINIC | Age: 83
End: 2018-01-22
Attending: NURSE PRACTITIONER | Admitting: RADIOLOGY
Payer: MEDICARE

## 2018-01-22 ENCOUNTER — HOSPITAL ENCOUNTER (OUTPATIENT)
Facility: CLINIC | Age: 83
Discharge: HOME | End: 2018-01-22
Attending: RADIOLOGY | Admitting: RADIOLOGY
Payer: MEDICARE

## 2018-01-22 VITALS
DIASTOLIC BLOOD PRESSURE: 61 MMHG | HEIGHT: 66 IN | SYSTOLIC BLOOD PRESSURE: 129 MMHG | WEIGHT: 165 LBS | HEART RATE: 67 BPM | RESPIRATION RATE: 16 BRPM | OXYGEN SATURATION: 96 % | BODY MASS INDEX: 26.52 KG/M2

## 2018-01-22 DIAGNOSIS — M54.16 LUMBAR RADICULAR PAIN: ICD-10-CM

## 2018-01-22 PROCEDURE — 25000125 ZZHC RX 250: Performed by: PHYSICIAN ASSISTANT

## 2018-01-22 PROCEDURE — 64483 NJX AA&/STRD TFRM EPI L/S 1: CPT

## 2018-01-22 PROCEDURE — 25000128 H RX IP 250 OP 636: Performed by: PHYSICIAN ASSISTANT

## 2018-01-22 PROCEDURE — 25500064 ZZH RX 255 OP 636: Performed by: PHYSICIAN ASSISTANT

## 2018-01-22 PROCEDURE — 40000863 ZZH STATISTIC RADIOLOGY XRAY, US, CT, MAR, NM

## 2018-01-22 RX ORDER — DEXAMETHASONE SODIUM PHOSPHATE 10 MG/ML
10 INJECTION, SOLUTION INTRAMUSCULAR; INTRAVENOUS ONCE
Status: COMPLETED | OUTPATIENT
Start: 2018-01-22 | End: 2018-01-22

## 2018-01-22 RX ORDER — IOPAMIDOL 408 MG/ML
10 INJECTION, SOLUTION INTRATHECAL ONCE
Status: COMPLETED | OUTPATIENT
Start: 2018-01-22 | End: 2018-01-22

## 2018-01-22 RX ORDER — LIDOCAINE HYDROCHLORIDE 10 MG/ML
30 INJECTION, SOLUTION EPIDURAL; INFILTRATION; INTRACAUDAL; PERINEURAL ONCE
Status: COMPLETED | OUTPATIENT
Start: 2018-01-22 | End: 2018-01-22

## 2018-01-22 RX ADMIN — DEXAMETHASONE SODIUM PHOSPHATE 10 MG: 10 INJECTION, SOLUTION INTRAMUSCULAR; INTRAVENOUS at 15:03

## 2018-01-22 RX ADMIN — IOPAMIDOL 2 ML: 408 INJECTION, SOLUTION INTRATHECAL at 15:03

## 2018-01-22 RX ADMIN — DEXAMETHASONE SODIUM PHOSPHATE 10 MG: 10 INJECTION, SOLUTION INTRAMUSCULAR; INTRAVENOUS at 14:59

## 2018-01-22 RX ADMIN — LIDOCAINE HYDROCHLORIDE 6 MG: 10 INJECTION, SOLUTION EPIDURAL; INFILTRATION; INTRACAUDAL; PERINEURAL at 15:08

## 2018-01-22 NOTE — PROGRESS NOTES
RADIOLOGY PROCEDURE NOTE  Patient name: Sharif Kumar  MRN: [id number]  : 1935    Pre-procedure diagnosis: Right thigh pain  Post-procedure diagnosis: Same    Procedure Date/Time: 2018  3:19 PM  Procedure: Right L3-L4 and L4-L5 TFESI  Estimated blood loss: None  Specimen(s) collected with description: none  The patient tolerated the procedure well with no immediate complications.  Significant findings:none    See imaging dictation for procedural details.    Provider name: Gómez Calderon  Assistant(s):None

## 2018-01-22 NOTE — IP AVS SNAPSHOT
[place] [address]    [place] [address]    Phone:  [phone]                                       After Visit Summary   1/22/2018    Sharif Kumar    MRN: [id number]           After Visit Summary Signature Page     I have received my discharge instructions, and my questions have been answered. I have discussed any challenges I see with this plan with the nurse or doctor.    ..........................................................................................................................................  Patient/Patient Representative Signature      ..........................................................................................................................................  Patient Representative Print Name and Relationship to Patient    ..................................................               ................................................  Date                                            Time    ..........................................................................................................................................  Reviewed by Signature/Title    ...................................................              ..............................................  Date                                                            Time

## 2018-01-22 NOTE — PROGRESS NOTES
Pre procedure plan of care reviewed with pt and spouse prior to injection. D/C instructions also reviewed.  Pt appears to accept and understand.  All questions answered.

## 2018-01-22 NOTE — PROGRESS NOTES
Discharged per w/c to home with wife. VSS. Drank some juice and ambulated in room prior to discharge. Band aid to lower back C/D/I. Continues to have (R) thigh discomfort with a rating of #3, states this discomfort is not new. Discharge instructions reviewed.

## 2018-01-22 NOTE — IP AVS SNAPSHOT
MRN:[id number]                      After Visit Summary   1/22/2018    Sharif Kumar    MRN: [id number]           Visit Information        Department      1/22/2018  1:22 PM [facility]          Review of your medicines      UNREVIEWED medicines. Ask your doctor about these medicines        Dose / Directions    bismuth subsalicylate 262 MG chewable tablet   Commonly known as:  PEPTO BISMOL        Dose:  524 mg   Take 2 tablets by mouth daily as needed.   Refills:  0       FLOMAX 0.4 MG capsule   Generic drug:  tamsulosin        Take by mouth daily   Refills:  0       loratadine 10 MG tablet   Commonly known as:  CLARITIN   Used for:  Chronic rhinitis        Dose:  10 mg   Take 1 tablet by mouth daily.   Quantity:  30 tablet   Refills:  1       LUTEIN PO        Dose:  40 mg   Take 40 mg by mouth daily   Refills:  0       triamcinolone 0.5 % cream   Commonly known as:  KENALOG   Used for:  Dermatitis        Apply sparingly to affected area three times daily.   Quantity:  30 g   Refills:  0                Protect others around you: Learn how to safely use, store and throw away your medicines at www.disposemymeds.org.         Follow-ups after your visit         Care Instructions        Further instructions from your care team       Steroid Injection Discharge Instructions     After you go home:      You may resume your normal diet.    Care of Puncture Site:      If you have a bandaid on your puncture site, you may remove it the next morning    You may shower tomorrow    No bath tubs, whirlpools or swimming for at least 3 days     Activity:      You may go back to normal activity in 24 hours    You should let pain be your guide as to the extent of your activities    Maintain any activity limitations as ordered by your provider    Do NOT drive a vehicle until tomorrow    Medicines:      You may resume all medications    For minor pain, you may take Acetaminophen (Tylenol) or Ibuprofen  (Advil)    Pain:       You may experience increased or different pain over the next 24-48 hours    For the next 48 hrs - you may use ice packs for discomfort     Call your primary care doctor if:      You have severe pain that does not improve with pain medication    You have chills or a fever greater than 101 F (38 C)    The site is red, swollen, hot or tender    New problems with your bowel or bladder    Any questions or concerns    Other Instructions:      New numbness down your leg post injection is temporary and may last for up to 6 hours. You may need assistance with activity until your leg has normal sensation.    For Your Information:      A steroid was injected to help decrease swelling and may help to reduce pain. It may take up to 7-10 days to obtain full results.    Some patients will get lasting relief from a single injection. Others may require up to 3 injections to get results. If you have more than one steroid injection, they should be given 2 weeks apart.    Side effects of your steroid injection are mild and will go away in 2-3 days  - Insomnia  - Heartburn  - Flushed face  - Water retention  - Increased appetite      If you have questions call:        [facility] Radiology Dept @ [phone]               Additional Information About Your Visit        [employer] Information     [employer] gives you secure access to your electronic health record. If you see a primary care provider, you can also send messages to your care team and make appointments. If you have questions, please call your primary care clinic.  If you do not have a primary care provider, please call [phone] and they will assist you.        Care EveryWhere ID     This is your Care EveryWhere ID. This could be used by other organizations to access your [facility] medical records  [id number]        Your Vitals Were     Blood Pressure Pulse Respirations Height Weight Pulse Oximetry    119/57 (BP Location: Right arm) 67 16 1.676 m  "(5' 6\") 74.8 kg (165 lb) 96%    BMI (Body Mass Index)                   26.63 kg/m2            Primary Care Provider Office Phone # Fax #    Jose Maria Valera [phone]      Equal Access to Services     [facility]: Hadii aad ku hadasho Soomaali, waaxda luqadaha, qaybta kaalmada adeegyada, waxay dayain hayaan adejacque juarez larobelashley . So [facility] [phone].    ATENCIÓN: Si habla español, tiene a woody disposición servicios gratuitos de asistencia lingüística. Llame al [phone].    We comply with applicable federal civil rights laws and [place] laws. We do not discriminate on the basis of race, color, national origin, age, disability, sex, sexual orientation, or gender identity.            Thank you!     Thank you for choosing [facility] for your care. Our goal is always to provide you with excellent care. Hearing back from our patients is one way we can continue to improve our services. Please take a few minutes to complete the written survey that you may receive in the mail after you visit with us. Thank you!             Medication List: This is a list of all your medications and when to take them. Check marks below indicate your daily home schedule. Keep this list as a reference.      Medications           Morning Afternoon Evening Bedtime As Needed    bismuth subsalicylate 262 MG chewable tablet   Commonly known as:  PEPTO BISMOL   Take 2 tablets by mouth daily as needed.                                FLOMAX 0.4 MG capsule   Take by mouth daily   Generic drug:  tamsulosin                                loratadine 10 MG tablet   Commonly known as:  CLARITIN   Take 1 tablet by mouth daily.                                LUTEIN PO   Take 40 mg by mouth daily                                triamcinolone 0.5 % cream   Commonly known as:  KENALOG   Apply sparingly to affected area three times daily.                                  "

## 2018-02-14 ENCOUNTER — TELEPHONE (OUTPATIENT)
Dept: NEUROSURGERY | Facility: CLINIC | Age: 83
End: 2018-02-14

## 2018-02-14 DIAGNOSIS — M54.41 LOW BACK PAIN WITH RIGHT-SIDED SCIATICA: Primary | ICD-10-CM

## 2018-02-14 NOTE — TELEPHONE ENCOUNTER
REASON FOR CALL:  Pt wanted to let Jessica know that he is still having a lot of pain while standing even after 2nd injection. Please call and advise on what to do next.     Detailed message can be left:  YES

## 2018-02-14 NOTE — TELEPHONE ENCOUNTER
Orders received from Jessica Delgado NP for right LE EMG.  Orders will be forwarded to Dr. Ga's office.  Sharif will be contacted with results once testing has been completed and reviewed.

## 2018-02-14 NOTE — TELEPHONE ENCOUNTER
Sharif reports that he does not feel his 2nd injection was helpful in relieving his pain.  Both injections were completed by the same interventionalist, most recent on 1/22/18.  He states that his pain level is in the same location, right hip and thigh.  It is tolerable when sitting, tolerable when moving around a little.  However he finds that if he is standing in one location long term the pain (deep ache) is difficult to bear.  He has had formal PT in the past, he reports about 6 visit in November of last year.  Will route to his care team for recommendations.  Most recent imaging was completed in December.

## 2018-02-16 PROBLEM — G89.29 CHRONIC MIDLINE LOW BACK PAIN WITH RIGHT-SIDED SCIATICA: Status: ACTIVE | Noted: 2017-11-04

## 2018-02-16 PROBLEM — M54.41 CHRONIC MIDLINE LOW BACK PAIN WITH RIGHT-SIDED SCIATICA: Status: ACTIVE | Noted: 2017-11-04

## 2018-02-17 ENCOUNTER — OFFICE VISIT (OUTPATIENT)
Dept: FAMILY MEDICINE | Facility: CLINIC | Age: 83
End: 2018-02-17
Payer: COMMERCIAL

## 2018-02-17 VITALS
DIASTOLIC BLOOD PRESSURE: 60 MMHG | TEMPERATURE: 97 F | WEIGHT: 168 LBS | RESPIRATION RATE: 16 BRPM | BODY MASS INDEX: 27.12 KG/M2 | SYSTOLIC BLOOD PRESSURE: 118 MMHG | HEART RATE: 59 BPM | OXYGEN SATURATION: 98 %

## 2018-02-17 DIAGNOSIS — G89.29 CHRONIC MIDLINE LOW BACK PAIN WITH RIGHT-SIDED SCIATICA: Primary | ICD-10-CM

## 2018-02-17 DIAGNOSIS — M70.61 GREATER TROCHANTERIC BURSITIS OF RIGHT HIP: ICD-10-CM

## 2018-02-17 DIAGNOSIS — M54.41 CHRONIC MIDLINE LOW BACK PAIN WITH RIGHT-SIDED SCIATICA: Primary | ICD-10-CM

## 2018-02-17 DIAGNOSIS — J32.9 CHRONIC SINUSITIS, UNSPECIFIED LOCATION: ICD-10-CM

## 2018-02-17 PROCEDURE — 99214 OFFICE O/P EST MOD 30 MIN: CPT | Performed by: INTERNAL MEDICINE

## 2018-02-17 NOTE — NURSING NOTE
"Chief Complaint   Patient presents with     URI       Initial /60 (BP Location: Left arm, Patient Position: Sitting, Cuff Size: Adult Regular)  Pulse 59  Temp 97  F (36.1  C) (Tympanic)  Resp 16  Wt 168 lb (76.2 kg)  SpO2 98%  BMI 27.12 kg/m2 Estimated body mass index is 27.12 kg/(m^2) as calculated from the following:    Height as of 1/22/18: 5' 6\" (1.676 m).    Weight as of this encounter: 168 lb (76.2 kg).  Medication Reconciliation: complete     Princess JUNG Severino CMA      "

## 2018-02-17 NOTE — PATIENT INSTRUCTIONS
Try some of the stretches and other exercises regarding possible trochanteric bursitis.                                                                       Use Tylenol as needed.                                              We could consider an orthopedics consult.                                                             Consider using Flonase nasal spray.                             We could consider a CT of your sinuses or an ENT consult.

## 2018-02-17 NOTE — MR AVS SNAPSHOT
After Visit Summary   2/17/2018    Sharif Kumar    MRN: [id number]           Patient Information     Date Of Birth          1935        Visit Information        Provider Department      2/17/2018 9:30 AM Jose Maria Valera MD [facility]        Today's Diagnoses     Chronic midline low back pain with right-sided sciatica    -  1    Greater trochanteric bursitis of right hip        Chronic sinusitis, unspecified location          Care Instructions                Try some of the stretches and other exercises regarding possible trochanteric bursitis.                                                                       Use Tylenol as needed.                                              We could consider an orthopedics consult.                                                             Consider using Flonase nasal spray.                             We could consider a CT of your sinuses or an ENT consult.                                                   Follow-ups after your visit        Who to contact     If you have questions or need follow up information about today's clinic visit or your schedule please contact [facility] directly at [phone].  Normal or non-critical lab and imaging results will be communicated to you by [employer] Access [employer], letter or phone within 4 business days after the clinic has received the results. If you do not hear from us within 7 days, please contact the clinic through [employer] or phone. If you have a critical or abnormal lab result, we will notify you by phone as soon as possible.  Submit refill requests through SecureWave or call your pharmacy and they will forward the refill request to us. Please allow 3 business days for your refill to be completed.          Additional Information About Your Visit        MyChart Information     SecureWave gives you secure access to your electronic health record. If you see a primary care  provider, you can also send messages to your care team and make appointments. If you have questions, please call your primary care clinic.  If you do not have a primary care provider, please call [phone] and they will assist you.        Care EveryWhere ID     This is your Care EveryWhere ID. This could be used by other organizations to access your [place] medical records  [id number]        Your Vitals Were     Pulse Temperature Respirations Pulse Oximetry BMI (Body Mass Index)       59 97  F (36.1  C) (Tympanic) 16 98% 27.12 kg/m2        Blood Pressure from Last 3 Encounters:   02/17/18 118/60   01/22/18 129/61   12/20/17 126/60    Weight from Last 3 Encounters:   02/17/18 168 lb (76.2 kg)   01/22/18 165 lb (74.8 kg)   12/20/17 168 lb (76.2 kg)              Today, you had the following     No orders found for display         Today's Medication Changes          These changes are accurate as of 2/17/18 10:05 AM.  If you have any questions, ask your nurse or doctor.               Start taking these medicines.        Dose/Directions    amoxicillin-clavulanate 875-125 MG per tablet   Commonly known as:  AUGMENTIN   Used for:  Chronic sinusitis, unspecified location   Started by:  Jose Maria Valera MD        Dose:  1 tablet   Take 1 tablet by mouth 2 times daily   Quantity:  20 tablet   Refills:  0            Where to get your medicines      These medications were sent to [facility] [address] [place] [address] [address] [facility] [address] [facility] [address]     Phone:  [phone]     amoxicillin-clavulanate 875-125 MG per tablet                Primary Care Provider Office Phone # Fax #    Jose Maria Valera [phone] [address]  [facility] [address]        Equal Access to Services     [facility]LE[facility]: Stephen pelayo hadasho Soomaali, waaxda luqadaha, qaybta kaalmada adeegyada, waxay shira lemons. So [facility]  [phone].    ATENCIÓN: Si hilda norwood, tiene a woody disposición servicios gratuitos de asistencia lingüística. Tatianna sheldon [phone].    We comply with applicable federal civil rights laws and [place] laws. We do not discriminate on the basis of race, color, national origin, age, disability, sex, sexual orientation, or gender identity.            Thank you!     Thank you for choosing [facility]  for your care. Our goal is always to provide you with excellent care. Hearing back from our patients is one way we can continue to improve our services. Please take a few minutes to complete the written survey that you may receive in the mail after your visit with us. Thank you!             Your Updated Medication List - Protect others around you: Learn how to safely use, store and throw away your medicines at www.disposemymeds.org.          This list is accurate as of 2/17/18 10:05 AM.  Always use your most recent med list.                   Brand Name Dispense Instructions for use Diagnosis    amoxicillin-clavulanate 875-125 MG per tablet    AUGMENTIN    20 tablet    Take 1 tablet by mouth 2 times daily    Chronic sinusitis, unspecified location       bismuth subsalicylate 262 MG chewable tablet    PEPTO BISMOL     Take 2 tablets by mouth daily as needed.        FLOMAX 0.4 MG capsule   Generic drug:  tamsulosin      Take by mouth daily        loratadine 10 MG tablet    CLARITIN    30 tablet    Take 1 tablet by mouth daily.    Chronic rhinitis       LUTEIN PO      Take 40 mg by mouth daily        triamcinolone 0.5 % cream    KENALOG    30 g    Apply sparingly to affected area three times daily.    Dermatitis

## 2018-02-17 NOTE — PROGRESS NOTES
SUBJECTIVE:   Sharif Kumar is a 82 year old male who presents to clinic today for the following health issues:    Additional concerns: 1.Would like to discuss recent steroid injection. States it did not help sciatica. Took tylenol after and that helped.  2. EMG scheduled 2/26    Acute Illness   Acute illness concerns?- Sinus pressure/pain  Onset: over 6 months    Fever: no    Fussiness: no    Sinus Pressure/Pain: YES    Decreased energy level: no    Conjunctivitis:  no    Ear Pain: YES- ringing in ears.feels full    Rhinorrhea: no    Congestion: no    Sore Throat: no     Cough: no    Wheeze: no    Breathing fast: no    Decreased Appetite: no    Nausea: no    Vomiting: no    Diarrhea:  no    Decreased wet diapers/output:no    Sick/Strep Exposure: no     Therapies Tried and outcome: completed Augmentin. Somewhat helped but still having symptoms                     He just started taking Tylenol; and it helped!        However, he does not like to take any medication on a long-term basis.     The recent epidural steroid injection did not help much.  He reports the first 1 did not help much either.           He has an EMG scheduled.                      He has had lots of pain with walking or standing, and sometimes with lying on his right side.                                                                         Augmentin seemed to hep with his sinuses; 80% improvement per pt.   10 days Rx.         Has some residual sx.                 Has used flonase occasionally; not consistently. Does not like to use meds on a chronic basis; any meds.                                                                                                                     He also expresses some concern about some changes in his wife's personality and mental state.                  Problem list and histories reviewed & adjusted, as indicated.  Additional history: as documented    Current Outpatient Prescriptions   Medication Sig  Dispense Refill     LUTEIN PO Take 40 mg by mouth daily       tamsulosin (FLOMAX) 0.4 MG capsule Take by mouth daily       triamcinolone (KENALOG) 0.5 % cream Apply sparingly to affected area three times daily. 30 g 0     bismuth subsalicylate (PEPTO BISMOL) 262 MG chewable tablet Take 2 tablets by mouth daily as needed.       loratadine (CLARITIN) 10 MG tablet Take 1 tablet by mouth daily. 30 tablet 1     Allergies   Allergen Reactions     No Known Drug Allergies      BP Readings from Last 3 Encounters:   02/17/18 118/60   01/22/18 129/61   12/20/17 126/60    Wt Readings from Last 3 Encounters:   02/17/18 168 lb (76.2 kg)   01/22/18 165 lb (74.8 kg)   12/20/17 168 lb (76.2 kg)                    Reviewed and updated as needed this visit by clinical staff       Reviewed and updated as needed this visit by Provider         ROS:  CONSTITUTIONAL:NEGATIVE for fever, chills, change in weight  ENT/MOUTH: POSITIVE for nasal congestion, postnasal drainage and sinus pressure  NEURO: NEGATIVE for weakness of his legs    OBJECTIVE:                                                    /60 (BP Location: Left arm, Patient Position: Sitting, Cuff Size: Adult Regular)  Pulse 59  Temp 97  F (36.1  C) (Tympanic)  Resp 16  Wt 168 lb (76.2 kg)  SpO2 98%  BMI 27.12 kg/m2  Body mass index is 27.12 kg/(m^2).  GENERAL APPEARANCE: alert and no distress  HENT: nose and mouth without ulcers or lesions  MS: normal range of motion right hip, with some tenderness to palpation over the right greater trochanter    Diagnostic test results:  none      ASSESSMENT/PLAN:                                                        ICD-10-CM    1. Chronic midline low back pain with right-sided sciatica M54.41     G89.29    2. Greater trochanteric bursitis of right hip M70.61    3. Chronic sinusitis, unspecified location J32.9 amoxicillin-clavulanate (AUGMENTIN) 875-125 MG per tablet       Summary and implications:  We discussed his situation at  length.       Some of his pain could be due to greater trochanteric bursitis.        The EMG may be helpful.                  We talked about an orthopedics consult to consider a corticosteroid injection into the greater trochanteric bursa.  I gave him some additional stretching and strengthening exercises to do for this area.                      We also discussed further evaluation of his sinuses with a CT scan.  He would like to try another course of Augmentin.  Sometimes prolonged treatment is needed.  Patient Instructions               Try some of the stretches and other exercises regarding possible trochanteric bursitis.                                                                       Use Tylenol as needed.                                              We could consider an orthopedics consult.                                                             Consider using Flonase nasal spray.                             We could consider a CT of your sinuses or an ENT consult.                                               Jose Maria Valera MD  [facility]

## 2018-02-19 ASSESSMENT — ANXIETY QUESTIONNAIRES
IF YOU CHECKED OFF ANY PROBLEMS ON THIS QUESTIONNAIRE, HOW DIFFICULT HAVE THESE PROBLEMS MADE IT FOR YOU TO DO YOUR WORK, TAKE CARE OF THINGS AT HOME, OR GET ALONG WITH OTHER PEOPLE: SOMEWHAT DIFFICULT
2. NOT BEING ABLE TO STOP OR CONTROL WORRYING: NOT AT ALL
GAD7 TOTAL SCORE: 5
6. BECOMING EASILY ANNOYED OR IRRITABLE: SEVERAL DAYS
5. BEING SO RESTLESS THAT IT IS HARD TO SIT STILL: SEVERAL DAYS
1. FEELING NERVOUS, ANXIOUS, OR ON EDGE: SEVERAL DAYS
7. FEELING AFRAID AS IF SOMETHING AWFUL MIGHT HAPPEN: NOT AT ALL
3. WORRYING TOO MUCH ABOUT DIFFERENT THINGS: SEVERAL DAYS

## 2018-02-19 ASSESSMENT — PATIENT HEALTH QUESTIONNAIRE - PHQ9: 5. POOR APPETITE OR OVEREATING: SEVERAL DAYS

## 2018-02-20 ASSESSMENT — PATIENT HEALTH QUESTIONNAIRE - PHQ9: SUM OF ALL RESPONSES TO PHQ QUESTIONS 1-9: 3

## 2018-02-20 ASSESSMENT — ANXIETY QUESTIONNAIRES: GAD7 TOTAL SCORE: 5

## 2018-02-26 ENCOUNTER — TRANSFERRED RECORDS (OUTPATIENT)
Dept: HEALTH INFORMATION MANAGEMENT | Facility: CLINIC | Age: 83
End: 2018-02-26

## 2018-02-28 ENCOUNTER — TELEPHONE (OUTPATIENT)
Dept: NEUROSURGERY | Facility: CLINIC | Age: 83
End: 2018-02-28

## 2018-02-28 ENCOUNTER — TELEPHONE (OUTPATIENT)
Dept: FAMILY MEDICINE | Facility: CLINIC | Age: 83
End: 2018-02-28

## 2018-02-28 NOTE — TELEPHONE ENCOUNTER
EMG normal.  Recc. Continue PT and injection therapy.  No surgery indicated at this time.       LM with his  for him to call back for results.

## 2018-02-28 NOTE — TELEPHONE ENCOUNTER
Date of receipt at location: 2/28/18  Farrah or [facility]? Farrah  Type of form: Health Care directiveChecked over by facilitator? Yes  All pages present? Yes  Any obvious gaps in documentation? No

## 2018-03-01 NOTE — TELEPHONE ENCOUNTER
Jessica Delgado CNP would recommend comprehensive pain care as patient is not surgical at this time. Called and discussed this recommendation with patient. He would like to hold off on us placing the order and would like to discuss with his PCP Jose Maria Valera. Patient will call back if he wants the order or he will have his PCP place it.

## 2018-03-01 NOTE — TELEPHONE ENCOUNTER
Spoke to patient. Reviewed results and recommendations given by Jessica Delgado CNP note from 2/28/18:  EMG normal.  Recc. Continue PT and injection therapy.  No surgery indicated at this time. Patient verbalized understanding.    Patient says he is and will continue with home PT exercises. He stated he had 2 injections and they were only mildly beneficial. He says he continues to have sporadic pain. Painful when standing too long. Has moderate pain when walking. Call routed to Jessica Delgado CNP for any additional recommendations.

## 2018-09-17 ENCOUNTER — APPOINTMENT (OUTPATIENT)
Age: 83
Setting detail: DERMATOLOGY
End: 2018-09-19

## 2018-09-17 DIAGNOSIS — Z85.828 PERSONAL HISTORY OF OTHER MALIGNANT NEOPLASM OF SKIN: ICD-10-CM

## 2018-09-17 DIAGNOSIS — L72.8 OTHER FOLLICULAR CYSTS OF THE SKIN AND SUBCUTANEOUS TISSUE: ICD-10-CM

## 2018-09-17 DIAGNOSIS — D22 MELANOCYTIC NEVI: ICD-10-CM

## 2018-09-17 DIAGNOSIS — L81.4 OTHER MELANIN HYPERPIGMENTATION: ICD-10-CM

## 2018-09-17 DIAGNOSIS — D18.0 HEMANGIOMA: ICD-10-CM

## 2018-09-17 DIAGNOSIS — Z71.89 OTHER SPECIFIED COUNSELING: ICD-10-CM

## 2018-09-17 DIAGNOSIS — L82.1 OTHER SEBORRHEIC KERATOSIS: ICD-10-CM

## 2018-09-17 PROBLEM — D22.5 MELANOCYTIC NEVI OF TRUNK: Status: ACTIVE | Noted: 2018-09-17

## 2018-09-17 PROBLEM — D18.01 HEMANGIOMA OF SKIN AND SUBCUTANEOUS TISSUE: Status: ACTIVE | Noted: 2018-09-17

## 2018-09-17 PROCEDURE — OTHER MIPS QUALITY: OTHER

## 2018-09-17 PROCEDURE — OTHER COUNSELING: OTHER

## 2018-09-17 PROCEDURE — 99214 OFFICE O/P EST MOD 30 MIN: CPT

## 2018-09-17 ASSESSMENT — LOCATION ZONE DERM
LOCATION ZONE: SCALP
LOCATION ZONE: FACE
LOCATION ZONE: TRUNK

## 2018-09-17 ASSESSMENT — LOCATION DETAILED DESCRIPTION DERM
LOCATION DETAILED: LEFT LATERAL FOREHEAD
LOCATION DETAILED: RIGHT MID-UPPER BACK
LOCATION DETAILED: RIGHT INFERIOR UPPER BACK
LOCATION DETAILED: LEFT RIB CAGE
LOCATION DETAILED: RIGHT INFERIOR OCCIPITAL SCALP
LOCATION DETAILED: LEFT SUPERIOR LATERAL MALAR CHEEK
LOCATION DETAILED: RIGHT SUPERIOR LATERAL UPPER BACK

## 2018-09-17 ASSESSMENT — LOCATION SIMPLE DESCRIPTION DERM
LOCATION SIMPLE: LEFT CHEEK
LOCATION SIMPLE: RIGHT UPPER BACK
LOCATION SIMPLE: ABDOMEN
LOCATION SIMPLE: POSTERIOR SCALP
LOCATION SIMPLE: LEFT FOREHEAD

## 2018-09-17 NOTE — PROCEDURE: MIPS QUALITY
Detail Level: Detailed
Quality 226: Preventive Care And Screening: Tobacco Use: Screening And Cessation Intervention: Patient screened for tobacco and is an ex-smoker
Quality 431: Preventive Care And Screening: Unhealthy Alcohol Use - Screening: Patient screened for unhealthy alcohol use using a single question and scores less than 2 times per year
Quality 130: Documentation Of Current Medications In The Medical Record: Current Medications Documented
Quality 110: Preventive Care And Screening: Influenza Immunization: Influenza Immunization previously received during influenza season

## 2018-11-12 ENCOUNTER — OFFICE VISIT (OUTPATIENT)
Dept: FAMILY MEDICINE | Facility: CLINIC | Age: 83
End: 2018-11-12
Payer: COMMERCIAL

## 2018-11-12 VITALS
OXYGEN SATURATION: 98 % | HEIGHT: 66 IN | RESPIRATION RATE: 20 BRPM | HEART RATE: 62 BPM | TEMPERATURE: 97.5 F | DIASTOLIC BLOOD PRESSURE: 62 MMHG | WEIGHT: 169 LBS | BODY MASS INDEX: 27.16 KG/M2 | SYSTOLIC BLOOD PRESSURE: 120 MMHG

## 2018-11-12 DIAGNOSIS — J32.9 CHRONIC SINUSITIS, UNSPECIFIED LOCATION: ICD-10-CM

## 2018-11-12 DIAGNOSIS — H91.93 BILATERAL HEARING LOSS, UNSPECIFIED HEARING LOSS TYPE: ICD-10-CM

## 2018-11-12 DIAGNOSIS — N40.0 BENIGN PROSTATIC HYPERPLASIA WITHOUT LOWER URINARY TRACT SYMPTOMS: ICD-10-CM

## 2018-11-12 DIAGNOSIS — Z00.00 ROUTINE HISTORY AND PHYSICAL EXAMINATION OF ADULT: Primary | ICD-10-CM

## 2018-11-12 DIAGNOSIS — E78.5 HYPERLIPIDEMIA, UNSPECIFIED HYPERLIPIDEMIA TYPE: ICD-10-CM

## 2018-11-12 DIAGNOSIS — R26.81 UNSTEADY GAIT: ICD-10-CM

## 2018-11-12 DIAGNOSIS — R41.3 MEMORY LOSS: ICD-10-CM

## 2018-11-12 DIAGNOSIS — R42 DIZZINESS: ICD-10-CM

## 2018-11-12 LAB
ALBUMIN UR-MCNC: NEGATIVE MG/DL
APPEARANCE UR: CLEAR
BASOPHILS # BLD AUTO: 0 10E9/L (ref 0–0.2)
BASOPHILS NFR BLD AUTO: 0.5 %
BILIRUB UR QL STRIP: NEGATIVE
COLOR UR AUTO: YELLOW
DIFFERENTIAL METHOD BLD: NORMAL
EOSINOPHIL # BLD AUTO: 0.3 10E9/L (ref 0–0.7)
EOSINOPHIL NFR BLD AUTO: 5.6 %
ERYTHROCYTE [DISTWIDTH] IN BLOOD BY AUTOMATED COUNT: 13.5 % (ref 10–15)
GLUCOSE UR STRIP-MCNC: NEGATIVE MG/DL
HCT VFR BLD AUTO: 43.7 % (ref 40–53)
HGB BLD-MCNC: 14.3 G/DL (ref 13.3–17.7)
HGB UR QL STRIP: NEGATIVE
KETONES UR STRIP-MCNC: NEGATIVE MG/DL
LEUKOCYTE ESTERASE UR QL STRIP: NEGATIVE
LYMPHOCYTES # BLD AUTO: 1.6 10E9/L (ref 0.8–5.3)
LYMPHOCYTES NFR BLD AUTO: 29.5 %
MCH RBC QN AUTO: 31.9 PG (ref 26.5–33)
MCHC RBC AUTO-ENTMCNC: 32.7 G/DL (ref 31.5–36.5)
MCV RBC AUTO: 98 FL (ref 78–100)
MONOCYTES # BLD AUTO: 0.6 10E9/L (ref 0–1.3)
MONOCYTES NFR BLD AUTO: 10.5 %
NEUTROPHILS # BLD AUTO: 3 10E9/L (ref 1.6–8.3)
NEUTROPHILS NFR BLD AUTO: 53.9 %
NITRATE UR QL: NEGATIVE
PH UR STRIP: 7 PH (ref 5–7)
PLATELET # BLD AUTO: 205 10E9/L (ref 150–450)
RBC # BLD AUTO: 4.48 10E12/L (ref 4.4–5.9)
SOURCE: NORMAL
SP GR UR STRIP: 1.01 (ref 1–1.03)
UROBILINOGEN UR STRIP-ACNC: 0.2 EU/DL (ref 0.2–1)
WBC # BLD AUTO: 5.5 10E9/L (ref 4–11)

## 2018-11-12 PROCEDURE — 82607 VITAMIN B-12: CPT | Performed by: INTERNAL MEDICINE

## 2018-11-12 PROCEDURE — 81003 URINALYSIS AUTO W/O SCOPE: CPT | Performed by: INTERNAL MEDICINE

## 2018-11-12 PROCEDURE — 80061 LIPID PANEL: CPT | Performed by: INTERNAL MEDICINE

## 2018-11-12 PROCEDURE — 36415 COLL VENOUS BLD VENIPUNCTURE: CPT | Performed by: INTERNAL MEDICINE

## 2018-11-12 PROCEDURE — 80053 COMPREHEN METABOLIC PANEL: CPT | Performed by: INTERNAL MEDICINE

## 2018-11-12 PROCEDURE — 84443 ASSAY THYROID STIM HORMONE: CPT | Performed by: INTERNAL MEDICINE

## 2018-11-12 PROCEDURE — 85025 COMPLETE CBC W/AUTO DIFF WBC: CPT | Performed by: INTERNAL MEDICINE

## 2018-11-12 PROCEDURE — 99397 PER PM REEVAL EST PAT 65+ YR: CPT | Performed by: INTERNAL MEDICINE

## 2018-11-12 ASSESSMENT — ACTIVITIES OF DAILY LIVING (ADL): CURRENT_FUNCTION: NO ASSISTANCE NEEDED

## 2018-11-12 NOTE — LETTER
November 13, 2018      Shairf Kumar  [address] [facility] [address]        Dear ,    We are writing to inform you of your test results.           Most of your lab results are good,including the kidney,liver,bone marrow, cholesterol, thyroid, glucose, and B12 level.      The triglycerides are okay since you were not fasting.    Resulted Orders   Lipid panel reflex to direct LDL Non-fasting   Result Value Ref Range    Cholesterol 164 <200 mg/dL    Triglycerides 206 (H) <150 mg/dL      Comment:      Borderline high:  150-199 mg/dl  High:             200-499 mg/dl  Very high:       >499 mg/dl      HDL Cholesterol 49 >39 mg/dL    LDL Cholesterol Calculated 74 <100 mg/dL      Comment:      Desirable:       <100 mg/dl    Non HDL Cholesterol 115 <130 mg/dL   Comprehensive metabolic panel (BMP + Alb, Alk Phos, ALT, AST, Total. Bili, TP)   Result Value Ref Range    Sodium 141 133 - 144 mmol/L    Potassium 4.1 3.4 - 5.3 mmol/L    Chloride 107 94 - 109 mmol/L    Carbon Dioxide 26 20 - 32 mmol/L    Anion Gap 8 3 - 14 mmol/L    Glucose 82 70 - 99 mg/dL    Urea Nitrogen 18 7 - 30 mg/dL    Creatinine 1.15 0.66 - 1.25 mg/dL    GFR Estimate 61 >60 mL/min/1.7m2      Comment:      Non  GFR Calc    GFR Estimate If Black 74 >60 mL/min/1.7m2      Comment:       GFR Calc    Calcium 8.7 8.5 - 10.1 mg/dL    Bilirubin Total 0.3 0.2 - 1.3 mg/dL    Albumin 3.7 3.4 - 5.0 g/dL    Protein Total 7.6 6.8 - 8.8 g/dL    Alkaline Phosphatase 84 40 - 150 U/L    ALT 28 0 - 70 U/L    AST 23 0 - 45 U/L   TSH with free T4 reflex   Result Value Ref Range    TSH 1.21 0.40 - 4.00 mU/L   CBC with platelets and differential   Result Value Ref Range    WBC 5.5 4.0 - 11.0 10e9/L    RBC Count 4.48 4.4 - 5.9 10e12/L    Hemoglobin 14.3 13.3 - 17.7 g/dL    Hematocrit 43.7 40.0 - 53.0 %    MCV 98 78 - 100 fl    MCH 31.9 26.5 - 33.0 pg    MCHC 32.7 31.5 - 36.5 g/dL    RDW 13.5 10.0 - 15.0 %    Platelet Count  205 150 - 450 10e9/L    % Neutrophils 53.9 %    % Lymphocytes 29.5 %    % Monocytes 10.5 %    % Eosinophils 5.6 %    % Basophils 0.5 %    Absolute Neutrophil 3.0 1.6 - 8.3 10e9/L    Absolute Lymphocytes 1.6 0.8 - 5.3 10e9/L    Absolute Monocytes 0.6 0.0 - 1.3 10e9/L    Absolute Eosinophils 0.3 0.0 - 0.7 10e9/L    Absolute Basophils 0.0 0.0 - 0.2 10e9/L    Diff Method Automated Method    Vitamin B12   Result Value Ref Range    Vitamin B12 446 193 - 986 pg/mL   UA reflex to Microscopic   Result Value Ref Range    Color Urine Yellow     Appearance Urine Clear     Glucose Urine Negative NEG^Negative mg/dL    Bilirubin Urine Negative NEG^Negative    Ketones Urine Negative NEG^Negative mg/dL    Specific Gravity Urine 1.015 1.003 - 1.035    Blood Urine Negative NEG^Negative    pH Urine 7.0 5.0 - 7.0 pH    Protein Albumin Urine Negative NEG^Negative mg/dL    Urobilinogen Urine 0.2 0.2 - 1.0 EU/dL    Nitrite Urine Negative NEG^Negative    Leukocyte Esterase Urine Negative NEG^Negative    Source Midstream Urine        If you have any questions or concerns, please call the clinic at the number listed above.       Sincerely,        Jose Maria Valera MD

## 2018-11-12 NOTE — PROGRESS NOTES
"  SUBJECTIVE:   Sharif Kumar is a 83 year old male who presents to clinic today for the following health issues:  {Provider please address medication reconciliation discrepancies--rooming staff please delete if no med/rec issues}    {Superlists:850073}    {additional problems for provider to add:789409}    Problem list and histories reviewed & adjusted, as indicated.  Additional history: {NONE - AS DOCUMENTED:573401::\"as documented\"}    {HIST REVIEW/ LINKS 2:430492}    Reviewed and updated as needed this visit by clinical staff  Tobacco  Allergies  Meds  Med Hx  Surg Hx  Fam Hx  Soc Hx      Reviewed and updated as needed this visit by Provider         {PROVIDER CHARTING PREFERENCE:199737}  "

## 2018-11-12 NOTE — MR AVS SNAPSHOT
After Visit Summary   11/12/2018    Sharif Kumar    MRN: [id number]           Patient Information     Date Of Birth          1935        Visit Information        Provider Department      11/12/2018 3:00 PM Jose Maria Valera MD [facility]        Today's Diagnoses     Routine history and physical examination of adult    -  1    Hyperlipidemia, unspecified hyperlipidemia type        Chronic sinusitis, unspecified location        Memory loss        Benign prostatic hyperplasia without lower urinary tract symptoms        Unsteady gait        Bilateral hearing loss, unspecified hearing loss type        Dizziness          Care Instructions    Call for an ENT appointment.                       I will let you know your lab results.   Consider getting the shingles vaccine (Shingrix) at your pharmacy.             Follow-ups after your visit        Additional Services     OTOLARYNGOLOGY REFERRAL       Your provider has referred you to: [facility]: [facility] [place] [facility] [place] [phone]   [email or web address]  [place] [phone]   [email or web address]    Please be aware that coverage of these services is subject to the terms and limitations of your health insurance plan.  Call member services at your health plan with any benefit or coverage questions.      Please bring the following with you to your appointment:    (1) Any X-Rays, CTs or MRIs which have been performed.  Contact the facility where they were done to arrange for  prior to your scheduled appointment.   (2) List of current medications  (3) This referral request   (4) Any documents/labs given to you for this referral                  Who to contact     If you have questions or need follow up information about today's clinic visit or your schedule please contact [facility] directly  "at [phone].  Normal or non-critical lab and imaging results will be communicated to you by MyChart, letter or phone within 4 business days after the clinic has received the results. If you do not hear from us within 7 days, please contact the clinic through Tw[employer]ah[employer] or phone. If you have a critical or abnormal lab result, we will notify you by phone as soon as possible.  Submit refill requests through [employer] or call your pharmacy and they will forward the refill request to us. Please allow 3 business days for your refill to be completed.          Additional Information About Your Visit        Twylah[employer] Information     [employer] gives you secure access to your electronic health record. If you see a primary care provider, you can also send messages to your care team and make appointments. If you have questions, please call your primary care clinic.  If you do not have a primary care provider, please call [phone] and they will assist you.        Care EveryWhere ID     This is your Care EveryWhere ID. This could be used by other organizations to access your [place] medical records  [id number]        Your Vitals Were     Pulse Temperature Respirations Height Pulse Oximetry BMI (Body Mass Index)    62 97.5  F (36.4  C) 20 5' 6\" (1.676 m) 98% 27.28 kg/m2       Blood Pressure from Last 3 Encounters:   11/12/18 120/62   02/17/18 118/60   01/22/18 129/61    Weight from Last 3 Encounters:   11/12/18 169 lb (76.7 kg)   02/17/18 168 lb (76.2 kg)   01/22/18 165 lb (74.8 kg)              We Performed the Following     CBC with platelets and differential     Comprehensive metabolic panel (BMP + Alb, Alk Phos, ALT, AST, Total. Bili, TP)     Lipid panel reflex to direct LDL Non-fasting     OTOLARYNGOLOGY REFERRAL     TSH with free T4 reflex     UA reflex to Microscopic     Vitamin B12          Today's Medication Changes          These changes are accurate as of 11/12/18  3:50 PM.  If you have any questions, ask your nurse or " doctor.               Stop taking these medicines if you haven't already. Please contact your care team if you have questions.     bismuth subsalicylate 262 MG chewable tablet   Commonly known as:  PEPTO BISMOL   Stopped by:  Jose Maria Valera MD           FLOMAX 0.4 MG capsule   Generic drug:  tamsulosin   Stopped by:  Jose Maria Valera MD           LUTEIN PO   Stopped by:  Jose Maria Valera MD           triamcinolone 0.5 % cream   Commonly known as:  KENALOG   Stopped by:  Jose Maria Valera MD                    Primary Care Provider Office Phone # Fax #    Jose Maria Valera -[phone] [address] [facility] [address]        Equal Access to Services     [facility]: Hadii aad ku hadasho Soomaali, waaxda luqadaha, qaybta kaalmada adeegyada, waxay idiin hayaan adeeg kharavianey triplett . So [facility] [phone].    ATENCIÓN: Si habla español, tiene a woody disposición servicios gratuitos de asistencia lingüística. Ll[facility] [phone].    We comply with applicable federal civil rights laws and [place] laws. We do not discriminate on the basis of race, color, national origin, age, disability, sex, sexual orientation, or gender identity.            Thank you!     Thank you for choosing [facility]  for your care. Our goal is always to provide you with excellent care. Hearing back from our patients is one way we can continue to improve our services. Please take a few minutes to complete the written survey that you may receive in the mail after your visit with us. Thank you!             Your Updated Medication List - Protect others around you: Learn how to safely use, store and throw away your medicines at www.disposemymeds.org.          This list is accurate as of 11/12/18  3:50 PM.  Always use your most recent med list.                   Brand Name Dispense Instructions for use Diagnosis    loratadine 10 MG tablet    CLARITIN    30 tablet    Take 1 tablet by  mouth daily.    Chronic rhinitis

## 2018-11-12 NOTE — PATIENT INSTRUCTIONS
Call for an ENT appointment.                       I will let you know your lab results.   Consider getting the shingles vaccine (Shingrix) at your pharmacy.

## 2018-11-12 NOTE — PROGRESS NOTES
"SUBJECTIVE:   Sharif Kumar is a 83 year old male who presents for Preventive Visit.      Are you in the first 12 months of your Medicare coverage?  No    Annual Wellness Visit     In general, how would you rate your overall health?  Good    Frequency of exercise:  2-3 days/week    Do you usually eat at least 4 servings of fruit and vegetables a day, include whole grains    & fiber and avoid regularly eating high fat or \"junk\" foods?  Yes    Taking medications regularly:  No    Medication side effects:  Lightheadedness and Other    Ability to successfully perform activities of daily living:  No assistance needed    Home Safety:  No safety concerns identified    Hearing Impairment:  Difficulty following a conversation in a noisy restaurant or crowded room, feel that people are mumbling or not speaking clearly, difficulty understanding soft or whispered speech and difficulty understanding speech on the telephone    In the past 6 months, have you been bothered by leaking of urine? Yes    In general, how would you rate your overall mental or emotional health?  Good    PHQ-2 Total Score: 0    Additional concerns today:  Yes        Fall risk:     No falls within last year- moderate risk?  COGNITIVE SCREEN  1) Repeat 3 items (Leader, Season, Table)    2) Clock draw: NORMAL  3) 3 item recall: Recalls 3 objects  Results: 3 items recalled: COGNITIVE IMPAIRMENT LESS LIKELY    Mini-CogTM Copyright S Darlene. Licensed by the author for use in [facility]; reprinted with permission (elva@.[email or web address]). All rights reserved.        Reviewed and updated as needed this visit by clinical staff  Tobacco  Allergies  Meds  Med Hx  Surg Hx  Fam Hx  Soc Hx        Reviewed and updated as needed this visit by Provider            Alcohol Use 11/12/2018   If you drink alcohol do you typically have greater than 3 drinks per day OR greater than 7 drinks per week? No           We discussed his R leg pain; some improvement.             "              Doing exercises.                                                  Ongoing sinus type sx.           Antibiotics helped, a few months ago.             Uses occas flonase or claritin.       They help a little bit.             Has a variety of sx, including low grade HA, occas dizziness, some hearing loss, etc.                                                                                                                                                                                 Does not take flomax; dribbles a bit.                 Do you feel safe in your environment - Yes    Do you have a Health Care Directive?: Yes: Patient states has Advance Directive and will bring in a copy to clinic.    Current providers sharing in care for this patient include:   Patient Care Team:  Jose Maria Valera MD as PCP - General (Internal Medicine)    The following health maintenance items are reviewed in Epic and correct as of today:  Health Maintenance   Topic Date Due     LIPID MONITORING Q1 YEAR  04/26/2017     ASTHMA CONTROL TEST Q6 MOS  06/28/2017     ADVANCE DIRECTIVE PLANNING Q5 YRS  07/31/2017     INFLUENZA VACCINE (1) 09/01/2018     ASTHMA ACTION PLAN Q1 YR  11/04/2018     FALL RISK ASSESSMENT  11/04/2018     NAMRATA QUESTIONNAIRE 1 YEAR  02/17/2019     PHQ-9 Q1YR  02/17/2019     COLONOSCOPY Q5 YR  03/12/2019     TETANUS IMMUNIZATION (SYSTEM ASSIGNED)  12/19/2026     PNEUMOCOCCAL  Completed     Patient Active Problem List    Diagnosis Date Noted     Chronic midline low back pain with right-sided sciatica 11/04/2017     Priority: High       MRI indicates among other findings, multilevel foraminal stenosis, bilaterally, worse on the R at L4-5,and on the L at L2-3 and L3-4.                  See spine consult.         TESS 12/17 ; a second one was not helpful.                                                                          EMG NORMAL IN 2/18       SUBARACHNOID HEMORRHAGE-4/2007 05/09/2007     Priority: High      R frontal; occurred while out of town; see f/u MRI 2007       (and 3/15)       Greater trochanteric bursitis of right hip 02/17/2018     Priority: Medium     Chronic sinusitis, unspecified location 12/20/2017     Priority: Medium     Pain of right thigh 07/11/2017     Priority: Medium     Pain of right lower extremity 07/03/2017     Priority: Medium     Unsteady gait 02/25/2015     Priority: Medium     Dizziness 02/25/2015     Priority: Medium     MRI brain neg in 3/15       Cough 02/25/2015     Priority: Medium     CXR neg in 6/15       Headache 02/25/2015     Priority: Medium     ESR=11  Problem list name updated by automated process. Provider to review       Fatigue 02/25/2015     Priority: Medium     Dermatitis 02/25/2015     Priority: Medium     Family history of colonic polyps 01/28/2014     Priority: Medium     Father?       Intermittent asthma 03/20/2012     Priority: Medium     See allergy notes; immunotherapy did not help per pt       BPH (benign prostatic hyperplasia) 09/21/2010     Priority: Medium     Prostate exam 2/15; minimal if any enlargement; likewise in 4/16       Hyperlipidemia 09/21/2010     Priority: Medium     DIS OF GALLBLADDER NEC-- polyp -- 2/2007 02/07/2007     Priority: Medium     No change through 9/10; no change in 2/14; a tiny GB polyp       Preventive measure 01/28/2014     Priority: Low     Colonoscopy 12/08; 3/14 neg     PSA 3.89 in 9/10         Counseling on health care directive 07/31/2012     Priority: Low     History reviewed. No pertinent surgical history.  Social History     Social History     Marital status: Single     Spouse name:      Number of children: 4     Years of education: N/A     Occupational History      Retired     ; retired 2000     Social History Main Topics     Smoking status: Passive Smoke Exposure - Never Smoker     Types: Cigars     Smokeless tobacco: Never Used      Comment: occasional cigar  maybe once a month     Alcohol use Yes       Comment: OCCASIONAL WINE     Drug use: No     Sexual activity: Yes     Partners: Female     Other Topics Concern     Parent/Sibling W/ Cabg, Mi Or Angioplasty Before 65f 55m? Yes     Social History Narrative    Referred by Dr. Lynn     Immunization History   Administered Date(s) Administered     HEPA 06/04/1996, 03/27/1997     Influenza (High Dose) 3 valent vaccine 11/11/2012, 10/27/2014, 10/17/2016, 11/04/2017, 10/23/2018     Influenza (IIV3) PF 11/07/2002, 11/06/2007, 11/21/2008, 12/09/2009, 09/21/2010, 11/03/2012, 10/28/2013     Pneumo Conj 13-V (2010&after) 12/28/2016     Pneumococcal 23 valent 10/25/2001, 09/29/2010, 10/27/2014     Poliovirus, inactivated (IPV) 03/27/1997     TD (ADULT, 7+) 03/27/1997, 08/13/2007     TDAP Vaccine (Adacel) 12/19/2016     Typhoid IM 09/03/2003, 12/19/2016     Typhoid Oral 12/09/2009     Yellow Fever 06/04/1996     Zoster vaccine, live 01/28/2014       BP Readings from Last 3 Encounters:   11/12/18 120/62   02/17/18 118/60   01/22/18 129/61    Wt Readings from Last 3 Encounters:   11/12/18 169 lb (76.7 kg)   02/17/18 168 lb (76.2 kg)   01/22/18 165 lb (74.8 kg)                  Current Outpatient Prescriptions   Medication Sig Dispense Refill     loratadine (CLARITIN) 10 MG tablet Take 1 tablet by mouth daily. 30 tablet 1     Allergies   Allergen Reactions     No Known Drug Allergies            Review of Systems  CONSTITUTIONAL: NEGATIVE for fever, chills, change in weight  INTEGUMENTARY/SKIN: NEGATIVE for worrisome rashes, moles or lesions  EYES: NEGATIVE for vision changes or irritation  RESP: NEGATIVE for significant cough or SOB  CV: NEGATIVE for chest pain, palpitations or peripheral edema  GI: NEGATIVE for nausea, abdominal pain, heartburn, or change in bowel habits  : NEGATIVE for frequency, dysuria, or hematuria  NEURO: POSITIVE for HX CVA  ENDOCRINE: NEGATIVE for temperature intolerance, skin/hair changes  HEME: NEGATIVE for bleeding problems  PSYCHIATRIC: NEGATIVE  "for changes in mood or affect    OBJECTIVE:   /62 (BP Location: Left arm, Patient Position: Chair, Cuff Size: Adult Regular)  Pulse 62  Temp 97.5  F (36.4  C)  Resp 20  Ht 5' 6\" (1.676 m)  Wt 169 lb (76.7 kg)  SpO2 98%  BMI 27.28 kg/m2 Estimated body mass index is 27.12 kg/(m^2) as calculated from the following:    Height as of this encounter: 5' 6\" (1.676 m).    Weight as of 2/17/18: 168 lb (76.2 kg).  Physical Exam  GENERAL: healthy, alert and no distress  EYES: Eyes grossly normal to inspection  HENT: ear canals and TM's normal, nose and mouth without ulcers or lesions  NECK: no adenopathy, no asymmetry, masses, or scars and thyroid normal to palpation  RESP: lungs clear to auscultation - no rales, rhonchi or wheezes  CV: regular rate and rhythm, normal S1 S2, no S3 or S4, no murmur, click or rub, no peripheral edema and peripheral pulses strong  ABDOMEN: soft, nontender, without hepatosplenomegaly or masses   (male): normal male genitalia without lesions or urethral discharge, no hernia  RECTAL: normal sphincter tone, no rectal masses, prostate 1+, smooth, nontender without nodules or masses  MS: no gross musculoskeletal defects noted, no edema  SKIN: no suspicious lesions or rashes  NEURO: Normal strength and tone, mentation intact and speech normal  LYMPH: no cervical, supraclavicular, axillary, or inguinal adenopathy    Diagnostic Test Results:  Pending; non fasting    ASSESSMENT / PLAN:   Sharif was seen today for physical.    Diagnoses and all orders for this visit:    Routine history and physical examination of adult    Hyperlipidemia, unspecified hyperlipidemia type  -     Lipid panel reflex to direct LDL Non-fasting  -     Comprehensive metabolic panel (BMP + Alb, Alk Phos, ALT, AST, Total. 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       He wants to see ENT.                    Labs are 3 hr pp.        Patient Instructions   Call for an ENT appointment.                       I will let you know your lab results.   Consider getting the shingles vaccine (Shingrix) at your pharmacy.         End of Life Planning:  Patient currently has an advanced directive: Yes.  Practitioner is supportive of decision.    COUNSELING:  Reviewed preventive health counseling, as reflected in patient instructions    BP Readings from Last 1 Encounters:   02/17/18 118/60     Estimated body mass index is 27.12 kg/(m^2) as calculated from the following:    Height as of this encounter: 5' 6\" (1.676 m).    Weight as of 2/17/18: 168 lb (76.2 kg).           reports that he is a non-smoker but has been exposed to tobacco smoke. He has never used smokeless tobacco.      Appropriate preventive services were discussed with this patient, including applicable screening as appropriate for cardiovascular disease, diabetes, osteopenia/osteoporosis, and glaucoma.  As appropriate for age/gender, discussed screening for colorectal cancer, prostate cancer, breast cancer, and cervical cancer. Checklist reviewing preventive services available has been given to the patient.    Reviewed patients plan of care and provided an AVS. The Basic Care Plan (routine screening as documented in Health Maintenance) for Sharif meets the Care Plan requirement. This Care Plan has been established and reviewed with the Patient.    Counseling Resources:  ATP IV Guidelines  Pooled Cohorts Equation Calculator  Breast Cancer Risk Calculator  FRAX Risk " Assessment  ICSI Preventive Guidelines  Dietary Guidelines for Americans, 2010  USDA's MyPlate  ASA Prophylaxis  Lung CA Screening    Jose Maria Valera MD  [facility]           Results for orders placed or performed in visit on 11/12/18   Lipid panel reflex to direct LDL Non-fasting   Result Value Ref Range    Cholesterol 164 <200 mg/dL    Triglycerides 206 (H) <150 mg/dL    HDL Cholesterol 49 >39 mg/dL    LDL Cholesterol Calculated 74 <100 mg/dL    Non HDL Cholesterol 115 <130 mg/dL   Comprehensive metabolic panel (BMP + Alb, Alk Phos, ALT, AST, Total. Bili, TP)   Result Value Ref Range    Sodium 141 133 - 144 mmol/L    Potassium 4.1 3.4 - 5.3 mmol/L    Chloride 107 94 - 109 mmol/L    Carbon Dioxide 26 20 - 32 mmol/L    Anion Gap 8 3 - 14 mmol/L    Glucose 82 70 - 99 mg/dL    Urea Nitrogen 18 7 - 30 mg/dL    Creatinine 1.15 0.66 - 1.25 mg/dL    GFR Estimate 61 >60 mL/min/1.7m2    GFR Estimate If Black 74 >60 mL/min/1.7m2    Calcium 8.7 8.5 - 10.1 mg/dL    Bilirubin Total 0.3 0.2 - 1.3 mg/dL    Albumin 3.7 3.4 - 5.0 g/dL    Protein Total 7.6 6.8 - 8.8 g/dL    Alkaline Phosphatase 84 40 - 150 U/L    ALT 28 0 - 70 U/L    AST 23 0 - 45 U/L   TSH with free T4 reflex   Result Value Ref Range    TSH 1.21 0.40 - 4.00 mU/L   CBC with platelets and differential   Result Value Ref Range    WBC 5.5 4.0 - 11.0 10e9/L    RBC Count 4.48 4.4 - 5.9 10e12/L    Hemoglobin 14.3 13.3 - 17.7 g/dL    Hematocrit 43.7 40.0 - 53.0 %    MCV 98 78 - 100 fl    MCH 31.9 26.5 - 33.0 pg    MCHC 32.7 31.5 - 36.5 g/dL    RDW 13.5 10.0 - 15.0 %    Platelet Count 205 150 - 450 10e9/L    % Neutrophils 53.9 %    % Lymphocytes 29.5 %    % Monocytes 10.5 %    % Eosinophils 5.6 %    % Basophils 0.5 %    Absolute Neutrophil 3.0 1.6 - 8.3 10e9/L    Absolute Lymphocytes 1.6 0.8 - 5.3 10e9/L    Absolute Monocytes 0.6 0.0 - 1.3 10e9/L    Absolute Eosinophils 0.3 0.0 - 0.7 10e9/L    Absolute Basophils 0.0 0.0 - 0.2 10e9/L     Diff Method Automated Method    Vitamin B12   Result Value Ref Range    Vitamin B12 446 193 - 986 pg/mL   UA reflex to Microscopic   Result Value Ref Range    Color Urine Yellow     Appearance Urine Clear     Glucose Urine Negative NEG^Negative mg/dL    Bilirubin Urine Negative NEG^Negative    Ketones Urine Negative NEG^Negative mg/dL    Specific Gravity Urine 1.015 1.003 - 1.035    Blood Urine Negative NEG^Negative    pH Urine 7.0 5.0 - 7.0 pH    Protein Albumin Urine Negative NEG^Negative mg/dL    Urobilinogen Urine 0.2 0.2 - 1.0 EU/dL    Nitrite Urine Negative NEG^Negative    Leukocyte Esterase Urine Negative NEG^Negative    Source Midstream Urine       letter sent                               Most of your lab results are good,including the kidney,liver,bone marrow, cholesterol, thyroid, glucose, and B12 level.      The triglycerides are okay since you were not fasting.

## 2018-11-13 LAB
ALBUMIN SERPL-MCNC: 3.7 G/DL (ref 3.4–5)
ALP SERPL-CCNC: 84 U/L (ref 40–150)
ALT SERPL W P-5'-P-CCNC: 28 U/L (ref 0–70)
ANION GAP SERPL CALCULATED.3IONS-SCNC: 8 MMOL/L (ref 3–14)
AST SERPL W P-5'-P-CCNC: 23 U/L (ref 0–45)
BILIRUB SERPL-MCNC: 0.3 MG/DL (ref 0.2–1.3)
BUN SERPL-MCNC: 18 MG/DL (ref 7–30)
CALCIUM SERPL-MCNC: 8.7 MG/DL (ref 8.5–10.1)
CHLORIDE SERPL-SCNC: 107 MMOL/L (ref 94–109)
CHOLEST SERPL-MCNC: 164 MG/DL
CO2 SERPL-SCNC: 26 MMOL/L (ref 20–32)
CREAT SERPL-MCNC: 1.15 MG/DL (ref 0.66–1.25)
GFR SERPL CREATININE-BSD FRML MDRD: 61 ML/MIN/1.7M2
GLUCOSE SERPL-MCNC: 82 MG/DL (ref 70–99)
HDLC SERPL-MCNC: 49 MG/DL
LDLC SERPL CALC-MCNC: 74 MG/DL
NONHDLC SERPL-MCNC: 115 MG/DL
POTASSIUM SERPL-SCNC: 4.1 MMOL/L (ref 3.4–5.3)
PROT SERPL-MCNC: 7.6 G/DL (ref 6.8–8.8)
SODIUM SERPL-SCNC: 141 MMOL/L (ref 133–144)
TRIGL SERPL-MCNC: 206 MG/DL
TSH SERPL DL<=0.005 MIU/L-ACNC: 1.21 MU/L (ref 0.4–4)
VIT B12 SERPL-MCNC: 446 PG/ML (ref 193–986)

## 2018-11-13 ASSESSMENT — ASTHMA QUESTIONNAIRES: ACT_TOTALSCORE: 25

## 2018-11-20 ENCOUNTER — HOSPITAL LABORATORY (OUTPATIENT)
Dept: OTHER | Facility: CLINIC | Age: 83
End: 2018-11-20

## 2018-11-23 LAB
BACTERIA SPEC CULT: NORMAL
Lab: NORMAL
SPECIMEN SOURCE: NORMAL

## 2019-07-02 NOTE — PROCEDURE: MOHS SURGERY
Urine culture only.   Cheiloplasty (Less Than 50%) Text: A decision was made to reconstruct the defect with a  cheiloplasty.  The defect was undermined extensively.  Additional obicularis oris muscle was excised with a 15 blade scalpel.  The defect was converted into a full thickness wedge, of less than 50% of the vertical height of the lip, to facilite a better cosmetic result.  Small vessels were then tied off with 5-0 monocyrl. The obicularis oris, superficial fascia, adipose and dermis were then reapproximated.  After the deeper layers were approximated the epidermis was reapproximated with particular care given to realign the vermillion border.

## 2019-08-02 ENCOUNTER — NURSE TRIAGE (OUTPATIENT)
Dept: FAMILY MEDICINE | Facility: CLINIC | Age: 84
End: 2019-08-02

## 2019-08-02 NOTE — TELEPHONE ENCOUNTER
"Talked with pain this AM. States that he has had a headache on and off for about a month and cloudy vision that started this AM, but it better now. OV was set for PCP 8/27/2019. He was given option to come sooner with another provider, but declined at this time. States that he will call back if symptoms get worse or persist before his scheduled OV.     Additional Information    Negative: Difficult to awaken or acting confused (e.g., disoriented, slurred speech)    Negative: [1] Weakness of the face, arm or leg on one side of the body AND [2] new onset    Negative: [1] Numbness of the face, arm or leg on one side of the body AND [2] new onset    Negative: [1] Loss of speech or garbled speech AND [2] new onset    Negative: Passed out (i.e., lost consciousness, collapsed and was not responding)    Negative: Sounds like a life-threatening emergency to the triager    Negative: Followed a head injury within last 3 days    Negative: Pregnant    Negative: Traumatic Brain Injury (TBI) is suspected    Negative: Unable to walk, or can only walk with assistance (e.g., requires support)    Negative: Stiff neck (can't touch chin to chest)    Negative: Severe pain in one eye    Negative: [1] Other family members (or roommates) with headaches AND [2] possibility of carbon monoxide exposure    Negative: [1] SEVERE headache (e.g., excruciating) AND [2] \"worst headache\" of life    Negative: [1] SEVERE headache AND [2] sudden-onset (i.e., reaching maximum intensity within seconds)    Negative: [1] SEVERE headache AND [2] fever    Negative: Loss of vision or double vision (Exception: same as prior migraines)    Negative: [1] Fever > 100.0 F (37.8 C) AND [2] diabetes mellitus or weak immune system (e.g., HIV positive, cancer chemo, splenectomy, chronic steroids)    Negative: Patient sounds very sick or weak to the triager    Negative: [1] SEVERE headache (e.g., excruciating) AND [2] not improved after 2 hours of pain medicine    " "Negative: [1] Vomiting AND [2] 2 or more times (Exception: similar to previous migraines)    Negative: Fever > 104 F (40 C)    Negative: [1] MODERATE headache (e.g., interferes with normal activities) AND [2] present > 24 hours AND [3] unexplained  (Exceptions: analgesics not tried, typical migraine, or headache part of viral illness)    Negative: [1] New headache AND [2] weak immune system (e.g., HIV positive, cancer chemo, splenectomy, organ transplant, chronic steroids)    Negative: [1] New headache AND [2] age > 50    Negative: [1] Sinus pain of forehead AND [2] yellow or green nasal discharge    Negative: Fever present > 3 days (72 hours)    Negative: [1] MILD-MODERATE headache AND [2] present > 72 hours    Negative: Headache started during sex    Negative: Headache is a chronic symptom (recurrent or ongoing AND present > 4 weeks)    Negative: Similar to previously diagnosed migraine headaches    Negative: Similar to previously diagnosed muscle-tension headaches    Negative: [1] Headache AND [2] has not taken pain medications    Negative: [1] Headache AND [2] part of viral illness AND [3] present < 72 hours    Negative: Headache    Answer Assessment - Initial Assessment Questions  1. DESCRIPTION: \"What is the vision loss like? Describe it for me.\" (e.g., complete vision loss, blurred vision, double vision, floaters, etc.)      Cloudy vision,   2. LOCATION: \"One or both eyes?\" If one, ask: \"Which eye?\"      Both eyes, more in the right eye  3. SEVERITY: \"Can you see anything?\" If so, ask: \"What can you see?\" (e.g., fine print)      Started severe this AM, however gotten worse over the day.   4. ONSET: \"When did this begin?\" \"Did it start suddenly or has this been gradual?\"      today  5. PATTERN: \"Does this come and go, or has it been constant since it started?\"      consistent for today.   6. PAIN: \"Is there any pain in your eye(s)?\"  (Scale 1-10; or mild, moderate, severe)      none  7. CONTACTS-GLASSES: \"Do " "you wear contacts or glasses?\"      glasses  8. CAUSE: \"What do you think is causing this visual problem?\"      unknown  9. OTHER SYMPTOMS: \"Do you have any other symptoms?\" (e.g., confusion, headache, arm or leg weakness, speech problems)      headache    Answer Assessment - Initial Assessment Questions  1. LOCATION: \"Where does it hurt?\"       Not in any one location, seems to move around.   2. ONSET: \"When did the headache start?\" (Minutes, hours or days)       1 month  3. PATTERN: \"Does the pain come and go, or has it been constant since it started?\"      Comes and goes over the month.   4. SEVERITY: \"How bad is the pain?\" and \"What does it keep you from doing?\"  (e.g., Scale 1-10; mild, moderate, or severe)    - MILD (1-3): doesn't interfere with normal activities     - MODERATE (4-7): interferes with normal activities or awakens from sleep     - SEVERE (8-10): excruciating pain, unable to do any normal activities         mild  5. RECURRENT SYMPTOM: \"Have you ever had headaches before?\" If so, ask: \"When was the last time?\" and \"What happened that time?\"       no  6. CAUSE: \"What do you think is causing the headache?\"      unknown  7. MIGRAINE: \"Have you been diagnosed with migraine headaches?\" If so, ask: \"Is this headache similar?\"       Unknown, maybe  8. HEAD INJURY: \"Has there been any recent injury to the head?\"       none    Protocols used: HEADACHE-A-AH, VISION LOSS OR CHANGE-A-AH      "

## 2019-08-07 ENCOUNTER — APPOINTMENT (OUTPATIENT)
Age: 84
Setting detail: DERMATOLOGY
End: 2019-09-02

## 2019-08-07 DIAGNOSIS — L70.8 OTHER ACNE: ICD-10-CM

## 2019-08-07 DIAGNOSIS — L85.3 XEROSIS CUTIS: ICD-10-CM

## 2019-08-07 DIAGNOSIS — L91.8 OTHER HYPERTROPHIC DISORDERS OF THE SKIN: ICD-10-CM

## 2019-08-07 DIAGNOSIS — Z85.828 PERSONAL HISTORY OF OTHER MALIGNANT NEOPLASM OF SKIN: ICD-10-CM

## 2019-08-07 DIAGNOSIS — L82.1 OTHER SEBORRHEIC KERATOSIS: ICD-10-CM

## 2019-08-07 DIAGNOSIS — L57.0 ACTINIC KERATOSIS: ICD-10-CM

## 2019-08-07 PROCEDURE — 99213 OFFICE O/P EST LOW 20 MIN: CPT | Mod: 25

## 2019-08-07 PROCEDURE — OTHER COUNSELING: OTHER

## 2019-08-07 PROCEDURE — OTHER EXTRACTIONS: OTHER

## 2019-08-07 PROCEDURE — OTHER DIAGNOSIS COMMENT: OTHER

## 2019-08-07 PROCEDURE — OTHER MIPS QUALITY: OTHER

## 2019-08-07 PROCEDURE — 17003 DESTRUCT PREMALG LES 2-14: CPT

## 2019-08-07 PROCEDURE — 17000 DESTRUCT PREMALG LESION: CPT

## 2019-08-07 PROCEDURE — OTHER LIQUID NITROGEN: OTHER

## 2019-08-07 ASSESSMENT — LOCATION SIMPLE DESCRIPTION DERM
LOCATION SIMPLE: LEFT CHEEK
LOCATION SIMPLE: LEFT SCALP
LOCATION SIMPLE: POSTERIOR NECK
LOCATION SIMPLE: LEFT INFERIOR EYELID
LOCATION SIMPLE: LEFT FOREHEAD
LOCATION SIMPLE: RIGHT CHEEK
LOCATION SIMPLE: LEFT ANTERIOR NECK
LOCATION SIMPLE: ABDOMEN
LOCATION SIMPLE: RIGHT SCALP
LOCATION SIMPLE: SCALP

## 2019-08-07 ASSESSMENT — LOCATION DETAILED DESCRIPTION DERM
LOCATION DETAILED: RIGHT CENTRAL FRONTAL SCALP
LOCATION DETAILED: LEFT INFERIOR LATERAL MALAR CHEEK
LOCATION DETAILED: LEFT FOREHEAD
LOCATION DETAILED: LEFT CENTRAL FRONTAL SCALP
LOCATION DETAILED: RIGHT LATERAL ABDOMEN
LOCATION DETAILED: RIGHT SUPERIOR POSTERIOR NECK
LOCATION DETAILED: LEFT INFERIOR ANTERIOR NECK
LOCATION DETAILED: RIGHT SUPERIOR LATERAL MALAR CHEEK
LOCATION DETAILED: LEFT LATERAL INFERIOR EYELID
LOCATION DETAILED: RIGHT SUPERIOR CENTRAL MALAR CHEEK
LOCATION DETAILED: RIGHT SUPERIOR PARIETAL SCALP

## 2019-08-07 ASSESSMENT — LOCATION ZONE DERM
LOCATION ZONE: SCALP
LOCATION ZONE: NECK
LOCATION ZONE: FACE
LOCATION ZONE: EYELID
LOCATION ZONE: TRUNK

## 2019-08-07 NOTE — PROCEDURE: LIQUID NITROGEN
Consent: The patient's consent was obtained including but not limited to risks of crusting, scabbing, blistering, scarring, darker or lighter pigmentary change, recurrence, incomplete removal and infection.
Post-Care Instructions: I reviewed with the patient in detail post-care instructions. Patient is to wear sunprotection, and avoid picking at any of the treated lesions. Pt may apply Vaseline to crusted or scabbing areas.
Number Of Freeze-Thaw Cycles: 2 freeze-thaw cycles
Duration Of Freeze Thaw-Cycle (Seconds): 15
Detail Level: Detailed
Render Note In Bullet Format When Appropriate: No
Render Post-Care Instructions In Note?: yes

## 2019-08-07 NOTE — PROCEDURE: COUNSELING
Detail Level: Zone
Patient Specific Counseling (Will Not Stick From Patient To Patient): Patient recommended to shower daily for at least a week and use Tsal shampoo.
Detail Level: Detailed
Patient Specific Counseling (Will Not Stick From Patient To Patient): Recommended to apply AmLactin daily
Detail Level: Simple

## 2019-08-07 NOTE — PROCEDURE: MIPS QUALITY
Detail Level: Detailed
Quality 226: Preventive Care And Screening: Tobacco Use: Screening And Cessation Intervention: Patient screened for tobacco and is an ex-smoker
Quality 130: Documentation Of Current Medications In The Medical Record: Current Medications Documented
Quality 431: Preventive Care And Screening: Unhealthy Alcohol Use - Screening: Patient screened for unhealthy alcohol use using a single question and scores less than 2 times per year
Quality 474: Zoster Vaccination Status: Shingrix Vaccination not Administered or Previously Received, Reason not Otherwise Specified
Quality 131: Pain Assessment And Follow-Up: Pain assessment using a standardized tool is documented as negative, no follow-up plan required
Quality 110: Preventive Care And Screening: Influenza Immunization: Influenza Immunization previously received during influenza season

## 2019-08-07 NOTE — PROCEDURE: DIAGNOSIS COMMENT
Detail Level: Simple
Comment: Procedure performed by Dr. Sue Salinas, DNP APRN NP-C
Comment: Procedure performed by Dr. Sue Salinas, DNP APRN NP-C

## 2019-08-07 NOTE — PROCEDURE: EXTRACTIONS
Extraction Method: cotton-tipped applicators and gentle pressure
Post-Care Instructions: I reviewed with the patient in detail post-care instructions. Patient is to keep the treatment areas dry overnight, and then apply bacitracin twice daily until healed. Patient may apply hydrogen peroxide soaks to remove any crusting.
Detail Level: Detailed
Acne Type: Comedonal Lesions
Consent was obtained and risks were reviewed including but not limited to scarring, infection, bleeding, scabbing, incomplete removal, and allergy to anesthesia.
Render Post-Care Instructions In Note?: no
Prep Text (Optional): Prior to removal the treatment areas were prepped in the usual fashion.

## 2019-08-25 PROBLEM — H35.30 MACULAR DEGENERATION (SENILE) OF RETINA: Status: ACTIVE | Noted: 2019-08-25

## 2019-08-27 ENCOUNTER — OFFICE VISIT (OUTPATIENT)
Dept: FAMILY MEDICINE | Facility: CLINIC | Age: 84
End: 2019-08-27
Payer: COMMERCIAL

## 2019-08-27 VITALS
HEIGHT: 66 IN | WEIGHT: 167 LBS | SYSTOLIC BLOOD PRESSURE: 110 MMHG | TEMPERATURE: 97.9 F | HEART RATE: 63 BPM | BODY MASS INDEX: 26.84 KG/M2 | OXYGEN SATURATION: 99 % | DIASTOLIC BLOOD PRESSURE: 60 MMHG | RESPIRATION RATE: 20 BRPM

## 2019-08-27 DIAGNOSIS — M54.41 CHRONIC MIDLINE LOW BACK PAIN WITH RIGHT-SIDED SCIATICA: Primary | ICD-10-CM

## 2019-08-27 DIAGNOSIS — G89.29 CHRONIC MIDLINE LOW BACK PAIN WITH RIGHT-SIDED SCIATICA: Primary | ICD-10-CM

## 2019-08-27 DIAGNOSIS — J32.9 CHRONIC SINUSITIS, UNSPECIFIED LOCATION: ICD-10-CM

## 2019-08-27 DIAGNOSIS — R41.3 MEMORY LOSS: ICD-10-CM

## 2019-08-27 DIAGNOSIS — H35.30 MACULAR DEGENERATION (SENILE) OF RETINA: ICD-10-CM

## 2019-08-27 PROCEDURE — 99214 OFFICE O/P EST MOD 30 MIN: CPT | Performed by: INTERNAL MEDICINE

## 2019-08-27 ASSESSMENT — MIFFLIN-ST. JEOR: SCORE: 1405.26

## 2019-08-27 NOTE — PATIENT INSTRUCTIONS
You should try again to take the AREDS-2 formula  Preservision tablets.                 Call for a consult with the pain clinic.                            Consider getting the shingles vaccine (Shingrix) at your pharmacy.              You are planning to follow up with the ENT clinic.

## 2019-08-27 NOTE — PROGRESS NOTES
"Subjective     Sharif Kumar is a 83 year old male who presents to clinic today for the following health issues:    HPI   Discuss multiple health concerns today, including Right side Sciatica? and right hip, and sinus issue for about 1 year.                        Saw ENT; Dr Cuba (sp?) 11/18.      Med was ordered; was imaging done? He does not recall.      He has persistent symptoms of nasal congestion etc.                                            Had some visual blurring when he made the appt; has resolved by now.     S/p ophth appt 4/19; early macular degeneration.              He has not been taking preservation on a regular basis.                              Ongoing R leg pain; worse with standing , sometimes with walking; but he can mow the lawn for one hour.                     S/p TESS times 2 ; doing home exercises.                 A friend advised CBD.       He wonders about having acupuncture treatments.                       Some STM loss.        This has been stable since his subarachnoid hemorrhage  12 years ago.                                               Patient Active Problem List    Diagnosis Date Noted     Chronic midline low back pain with right-sided sciatica 11/04/2017     Priority: High       MRI indicates among other findings, multilevel foraminal stenosis, bilaterally, worse on the R at L4-5,and on the L at L2-3 and L3-4.                  See spine consult.         TESS 12/17 ; a second one was not helpful.                                                                          EMG NORMAL IN 2/18       SUBARACHNOID HEMORRHAGE-4/2007 05/09/2007     Priority: High     R frontal; occurred while out of town; see f/u MRI 2007       (and 3/15)       Macular degeneration (senile) of retina 08/25/2019     Priority: Medium     see ophth note 6/18; \"early,dry\"       Memory loss 11/12/2018     Priority: Medium     in part related to SAH in the past       Greater trochanteric bursitis of right hip " 02/17/2018     Priority: Medium     Chronic sinusitis, unspecified location 12/20/2017     Priority: Medium     Pain of right thigh 07/11/2017     Priority: Medium     Pain of right lower extremity 07/03/2017     Priority: Medium     Unsteady gait 02/25/2015     Priority: Medium     Dizziness 02/25/2015     Priority: Medium     MRI brain neg in 3/15       Cough 02/25/2015     Priority: Medium     CXR neg in 6/15       Headache 02/25/2015     Priority: Medium     ESR=11  Problem list name updated by automated process. Provider to review       Fatigue 02/25/2015     Priority: Medium     Dermatitis 02/25/2015     Priority: Medium     Family history of colonic polyps 01/28/2014     Priority: Medium     Father?       Intermittent asthma 03/20/2012     Priority: Medium     See allergy notes; immunotherapy did not help per pt       BPH (benign prostatic hyperplasia) 09/21/2010     Priority: Medium     Prostate exam 2/15; minimal if any enlargement; likewise in 4/16       Hyperlipidemia 09/21/2010     Priority: Medium     DIS OF GALLBLADDER NEC-- polyp -- 2/2007 02/07/2007     Priority: Medium     No change through 9/10; no change in 2/14; a tiny GB polyp       Preventive measure 01/28/2014     Priority: Low     Colonoscopy 12/08; 3/14 neg     PSA 3.89 in 9/10         Counseling on health care directive 07/31/2012     Priority: Low       Current Outpatient Medications   Medication Sig Dispense Refill     loratadine (CLARITIN) 10 MG tablet Take 1 tablet by mouth daily. 30 tablet 1     Allergies   Allergen Reactions     No Known Drug Allergies      BP Readings from Last 3 Encounters:   08/27/19 110/60   11/12/18 120/62   02/17/18 118/60    Wt Readings from Last 3 Encounters:   08/27/19 75.8 kg (167 lb)   11/12/18 76.7 kg (169 lb)   02/17/18 76.2 kg (168 lb)                      Reviewed and updated as needed this visit by Provider         Review of Systems See above plus  ROS COMP: CONSTITUTIONAL:NEGATIVE for fever, chills,  "change in weight  ENT/MOUTH: NEGATIVE for rhinorrhea-purulent  MUSCULOSKELETAL: NEGATIVE for neck pain  NEURO: NEGATIVE for weakness of his legs      Objective    /60 (BP Location: Left arm, Patient Position: Chair, Cuff Size: Adult Regular)   Pulse 63   Temp 97.9  F (36.6  C)   Resp 20   Ht 1.676 m (5' 6\")   Wt 75.8 kg (167 lb)   SpO2 99%   BMI 26.95 kg/m    Body mass index is 26.95 kg/m .  Physical Exam   GENERAL APPEARANCE: alert and no distress  HENT: nose and mouth without ulcers or lesions  MS: decreased range of motion Lumbar spine  PSYCH: Some short-term memory loss in terms of medical history especially    Diagnostic Test Results:  none         Assessment & Plan     Sharif was seen today for headache and eye problem.    Diagnoses and all orders for this visit:    Chronic midline low back pain with right-sided sciatica  -     [facility]    Chronic sinusitis, unspecified location    Macular degeneration (senile) of retina  Comments:  see ophth note 6/18; \"early,dry\"    Memory loss         BMI:   Estimated body mass index is 26.95 kg/m  as calculated from the following:    Height as of this encounter: 1.676 m (5' 6\").    Weight as of this encounter: 75.8 kg (167 lb).           Summary and implications:  We reviewed multiple issues.           We reviewed all of the issues on the diagnoses list.                               Patient Instructions   You should try again to take the AREDS-2 formula  Preservision tablets.                 Call for a consult with the pain clinic.                            Consider getting the shingles vaccine (Shingrix) at your pharmacy.              You are planning to follow up with the ENT clinic.         Return in about 12 weeks (around 11/19/2019) for yearly wellness visit.    Jose Maria Valera MD  [facility]      "

## 2019-08-27 NOTE — PROGRESS NOTES
"Subjective     Sharif Kumar is a 83 year old male who presents to clinic today for the following health issues:    HPI   Headache  Onset: ***    Description:   Location: {.:513000}   Character: {.:351163}  Frequency:  ***  Duration:  ***    Intensity: {.:224254}    Progression of Symptoms:  {.:044600}    Accompanying Signs & Symptoms:  Stiff neck: { :468258::\"no\"}  Neck or upper back pain: { :956012::\"no\"}  Fever: { :650020::\"no\"}  Sinus pressure: { :206073::\"no\"}  Nausea or vomiting: { :346435::\"no\"}  Dizziness: { :079085::\"no\"}  Numbness: { :813576::\"no\"}  Weakness: { :813804::\"no\"}  Visual changes: { :541412::\"no\"}    History:   Head trauma: { :490739::\"no\"}  Family history of migraines: { :390271::\"no\"}  Previous tests for headaches: { :511956::\"no\"}  Neurologist evaluations: { :090142::\"no\"}  Able to do daily activities: { :567285::\"no\"}  Wake with a headaches: { :371839::\"no\"}  Do headaches wake you up: { :146992::\"no\"}  Daily pain medication use: { :962464::\"no\"}  Work/school stressors/changes: { :502196::\"no\"}    Precipitating factors:   Does light make it worse: { :201844::\"no\"}  Does sound make it worse: { :617417::\"no\"}    Alleviating factors:  Does sleep help: { :169887::\"no\"}    Therapies Tried and outcome: {.:477357}  {additonal problems for provider to add (Optional):291729}    {HIST REVIEW/ LINKS 2 (Optional):269577}    {Additional problems for the provider to add (optional):588073}  Reviewed and updated as needed this visit by Provider         Review of Systems   {ROS COMP (Optional):564761}      Objective    There were no vitals taken for this visit.  There is no height or weight on file to calculate BMI.  Physical Exam   {Exam List (Optional):780475}    {Diagnostic Test Results (Optional):616373::\"Diagnostic Test Results:\",\"Labs reviewed in Epic\"}        {PROVIDER CHARTING PREFERENCE:088140}      "

## 2019-11-03 ENCOUNTER — HEALTH MAINTENANCE LETTER (OUTPATIENT)
Age: 84
End: 2019-11-03

## 2020-02-10 ENCOUNTER — HEALTH MAINTENANCE LETTER (OUTPATIENT)
Age: 85
End: 2020-02-10

## 2020-06-11 ENCOUNTER — VIRTUAL VISIT (OUTPATIENT)
Dept: ANESTHESIOLOGY | Facility: CLINIC | Age: 85
End: 2020-06-11
Attending: INTERNAL MEDICINE
Payer: COMMERCIAL

## 2020-06-11 ENCOUNTER — TELEPHONE (OUTPATIENT)
Dept: ANESTHESIOLOGY | Facility: CLINIC | Age: 85
End: 2020-06-11

## 2020-06-11 DIAGNOSIS — M79.651 PAIN OF RIGHT THIGH: Primary | ICD-10-CM

## 2020-06-11 RX ORDER — ACETAMINOPHEN 325 MG/1
325-650 TABLET ORAL EVERY 6 HOURS PRN
Status: ON HOLD | COMMUNITY
End: 2020-07-31

## 2020-06-11 ASSESSMENT — PAIN SCALES - GENERAL: PAINLEVEL: MILD PAIN (2)

## 2020-06-11 NOTE — LETTER
6/11/2020       RE: Sharif Kumar  [facility] [place] [address]     Dear Colleague,    Thank you for referring your patient, Sharif Kumar, to the [facility] FOR [facility]. Please see a copy of my visit note below.    Sharif Kumar is a 84 year old male who is being evaluated via a billable telephone visit.        Luna Campoverde CMA    Phone call duration: 29 minutes    VISIT RECOMMENDATIONS:  - RTC for physical examination and TPI vs greater trochanter injection as indicated by examination.    COMPREHENSIVE PAIN CLINIC INITIAL EVALUATION    I had the pleasure of meeting . Sharif Kumar on 6/11/2020 via telephone in consult for Dr. Valera with regards to his right hip pain.    Subjective:  The patient is a 84 year old male with past medical history of intermittent asthma and chronic right hip pain who presents via telephone for evaluation of right hip/thigh pain.  The patient's pain began 2-3 years ago following several road trips and has been somewhat improved since that time.  He reports that his pain is located primarily in right anterolateral thigh and does not radiate..  The patient describes the pain as aching.  He reports that the pain is made worse by driving, prolonged standing, walking.  His pain is improved with chiropractic and ice, and resolves quickly with sitting.  He denies any weakness or numbness of the leg associated with the pain.  He denies any new problems with falls or balance, any new bowel or bladder incontinence, any night sweats or unexplained fevers, or any sudden or unexpected weight loss.  The patient's pain does not have a significant temporal component.  He rates his average pain score at 0/10, but it can be as low as 2/10 or as severe as 7/10.     Current treatments:  - Acetaminophen    Previous medication treatments included:  Anti-convulsants: not tried  Muscle relaxors: not  tried  Anti-depressants: not tried  Acetaminophen/NSAIDs: acetaminophen only  Topicals: not tried    Other treatments have included:  Physical therapy: Multiple courses remotely  Pain Psychology: not tried  Chiropractic: Beneficial  Acupuncture: not tried  TENs Unit: not tried  Injections: transforaminal epidural steroid injection, non beneficial  Surgeries: none    Past Medical History:  Medical history reviewed.   Past Medical History:   Diagnosis Date     Helicobacter pylori (H. pylori)      Hypertrophy (benign) of prostate      Intermittent asthma 3/20/2012     Polyp of gallbladder 3/20/2012     Priapism       Patient Active Problem List   Diagnosis     DIS OF GALLBLADDER NEC-- polyp -- 2/2007     SUBARACHNOID HEMORRHAGE-4/2007     BPH (benign prostatic hyperplasia)     Hyperlipidemia     Counseling on health care directive     Intermittent asthma     Preventive measure     Family history of colonic polyps     Unsteady gait     Dizziness     Cough     Headache     Fatigue     Dermatitis     Pain of right lower extremity     Pain of right thigh     Chronic midline low back pain with right-sided sciatica     Chronic sinusitis, unspecified location     Greater trochanteric bursitis of right hip     Memory loss     Macular degeneration (senile) of retina     Past Surgical History:  Pertinent surgical history reviewed.   No past surgical history on file.     Medications: Pertinent medications reviewed and updated  Current Outpatient Medications   Medication Sig Dispense Refill     acetaminophen (TYLENOL) 325 MG tablet Take 325-650 mg by mouth every 6 hours as needed for mild pain       [place] and [place] Prescription Monitoring Program reviewed     Allergies: Pertinent allergies reviewed     Allergies   Allergen Reactions     No Known Drug Allergies      Family History:   family history includes Alzheimer Disease in his mother; Cerebrovascular Disease in his father; Diabetes in his brother; Other Cancer in his  brother.    Social history:   Social History     Socioeconomic History     Marital status: Single     Spouse name:      Number of children: 4     Years of education: Not on file     Highest education level: Not on file   Occupational History     Employer: RETIRED     Comment: [employer]    Social Needs     Financial resource strain: Not on file     Food insecurity     Worry: Not on file     Inability: Not on file     Transportation needs     Medical: Not on file     Non-medical: Not on file   Tobacco Use     Smoking status: Passive Smoke Exposure - Never Smoker     Smokeless tobacco: Never Used     Tobacco comment: occasional cigar  maybe once a month   Substance and Sexual Activity     Alcohol use: Yes     Comment: OCCASIONAL WINE     Drug use: No     Sexual activity: Not Currently   Lifestyle     Physical activity     Days per week: Not on file     Minutes per session: Not on file     Stress: Not on file   Relationships     Social connections     Talks on phone: Not on file     Gets together: Not on file     Attends Denominational service: Not on file     Active member of club or organization: Not on file     Attends meetings of clubs or organizations: Not on file     Relationship status: Not on file     Intimate partner violence     Fear of current or ex partner: Not on file     Emotionally abused: Not on file     Physically abused: Not on file     Forced sexual activity: Not on file   Other Topics Concern     Parent/sibling w/ CABG, MI or angioplasty before 65F 55M? Yes   Social History Narrative    Referred by Dr. Lynn                                       Mother had Alzheimer's; based on autopsy result.     Dx age 85;  age 92.                           Her mother also had Alzheimer's.         Social History     Social History Narrative    Referred by Dr. Lynn                                       Mother had Alzheimer's; based on autopsy result.     Dx age 85;  age 92.                            Her mother also had Alzheimer's.         He lives in [facility]. He is not currently working. He worked previously as a .  Smoking: cigars, but quit. Alcohol: 3-4 drinks/week. Street drugs: denies.     Review of Systems:  ROS   The 14 system ROS was reviewed from the intake questionnaire; results listed at end of note.    Physical Exam:  There were no vitals taken for this visit.    Physical Exam   Constitutional: He is oriented to person, place, and time.  He is not in acute distress.   Psychiatric: He has a normal mood and affect. His behavior is normal. Judgment and thought content normal.    Imaging:  - Procedural imaging x2 reviewed personally  - Lumbar MRI reviewed personally.  Diffuse low thoracic and lumbar spine degenerative changes including disc degeneration of multiple interspaces, prominent vertebral endplate changes at L4/L5 and multiple areas of mild to moderate central and foraminal stenosis.    Assessment:    The patient is a 84 year old male with PMHx of intermittent asthma and chronic right hip/thigh pain who was referred by Dr. Valera for evaluation of his chronic right hip/thigh pain.  The patient's pain is of uncertain origin at this point, although based upon his history it sounds most consistent with myofascial pain.  While he does have multiple areas of spinal and neuroforaminal stenosis that could produce similar symptoms, he has limited to no response to previous injections per his report.  Ultimately, physical examination is needed to confirm or eliminate other diagnoses which can produce pain in the area of concern.    Visit Diagnoses:  - Lumbar spondylosis with stenosis  - Myofascial pain    Plan:  Work up:    - RTC for physical examination    Referrals:    - None at this time    Medications:    - No medications at this time, will defer until physical examination is completed    Therapies:    - None at this time, consider PT pending outcome of physical  exam    Interventions:    - To be either completed in clinic or ordered following physical examination.    Follow up: RTC at next available appointment    Thank you for the consult.    Total time spent was 29 minutes, and more than 50% of face to face time was spent in counseling and/or coordination of care regarding principles of multidisciplinary care, medication management, and treatment options.    Gabriel Aguila MD    Department of Anesthesiology  Pain Management Division    LPN notified PAC rooming staff that the AVS was ready to be mailed to the pt.   Teresa Christina LPN

## 2020-06-11 NOTE — PROGRESS NOTES
LPN notified PAC rooming staff that the AVS was ready to be mailed to the pt.   Teresa Christina LPN

## 2020-06-11 NOTE — PROGRESS NOTES
"Sharif Kumar is a 84 year old male who is being evaluated via a billable telephone visit.      The patient has been notified of following:     \"This telephone visit will be conducted via a call between you and your physician/provider. We have found that certain health care needs can be provided without the need for a physical exam.  This service lets us provide the care you need with a short phone conversation.  If a prescription is necessary we can send it directly to your pharmacy.  If lab work is needed we can place an order for that and you can then stop by our lab to have the test done at a later time.    Telephone visits are billed at different rates depending on your insurance coverage. During this emergency period, for some insurers they may be billed the same as an in-person visit.  Please reach out to your insurance provider with any questions.    If during the course of the call the physician/provider feels a telephone visit is not appropriate, you will not be charged for this service.\"    Patient has given verbal consent for Telephone visit?  Yes    What phone number would you like to be contacted at? [phone]    How would you like to obtain your AVS? Mail a copy    Luna Campoverde CMA      "

## 2020-06-11 NOTE — PROGRESS NOTES
"Sharif Kumar is a 84 year old male who is being evaluated via a billable telephone visit.      The patient has been notified of following:     \"This telephone visit will be conducted via a call between you and your physician/provider. We have found that certain health care needs can be provided without the need for a physical exam.  This service lets us provide the care you need with a short phone conversation.  If a prescription is necessary we can send it directly to your pharmacy.  If lab work is needed we can place an order for that and you can then stop by our lab to have the test done at a later time.    Telephone visits are billed at different rates depending on your insurance coverage. During this emergency period, for some insurers they may be billed the same as an in-person visit.  Please reach out to your insurance provider with any questions.    If during the course of the call the physician/provider feels a telephone visit is not appropriate, you will not be charged for this service.\"    Patient has given verbal consent for Telephone visit?  Yes    What phone number would you like to be contacted at? [phone]    How would you like to obtain your AVS? Mail a copy    Phone call duration: 29 minutes    VISIT RECOMMENDATIONS:  - RTC for physical examination and TPI vs greater trochanter injection as indicated by examination.    COMPREHENSIVE PAIN CLINIC INITIAL EVALUATION    I had the pleasure of meeting Mr. Sharif Kumar on 6/11/2020 via telephone in consult for Dr. Valera with regards to his right hip pain.    Subjective:  The patient is a 84 year old male with past medical history of intermittent asthma and chronic right hip pain who presents via telephone for evaluation of right hip/thigh pain.  The patient's pain began 2-3 years ago following several road trips and has been somewhat improved since that time.  He reports that his pain is located primarily in right anterolateral thigh and does not " radiate..  The patient describes the pain as aching.  He reports that the pain is made worse by driving, prolonged standing, walking.  His pain is improved with chiropractic and ice, and resolves quickly with sitting.  He denies any weakness or numbness of the leg associated with the pain.  He denies any new problems with falls or balance, any new bowel or bladder incontinence, any night sweats or unexplained fevers, or any sudden or unexpected weight loss.  The patient's pain does not have a significant temporal component.  He rates his average pain score at 0/10, but it can be as low as 2/10 or as severe as 7/10.     Current treatments:  - Acetaminophen    Previous medication treatments included:  Anti-convulsants: not tried  Muscle relaxors: not tried  Anti-depressants: not tried  Acetaminophen/NSAIDs: acetaminophen only  Topicals: not tried    Other treatments have included:  Physical therapy: Multiple courses remotely  Pain Psychology: not tried  Chiropractic: Beneficial  Acupuncture: not tried  TENs Unit: not tried  Injections: transforaminal epidural steroid injection, non beneficial  Surgeries: none    Past Medical History:  Medical history reviewed.   Past Medical History:   Diagnosis Date     Helicobacter pylori (H. pylori)      Hypertrophy (benign) of prostate      Intermittent asthma 3/20/2012     Polyp of gallbladder 3/20/2012     Priapism       Patient Active Problem List   Diagnosis     DIS OF GALLBLADDER NEC-- polyp -- 2/2007     SUBARACHNOID HEMORRHAGE-4/2007     BPH (benign prostatic hyperplasia)     Hyperlipidemia     Counseling on health care directive     Intermittent asthma     Preventive measure     Family history of colonic polyps     Unsteady gait     Dizziness     Cough     Headache     Fatigue     Dermatitis     Pain of right lower extremity     Pain of right thigh     Chronic midline low back pain with right-sided sciatica     Chronic sinusitis, unspecified location     Greater  trochanteric bursitis of right hip     Memory loss     Macular degeneration (senile) of retina     Past Surgical History:  Pertinent surgical history reviewed.   No past surgical history on file.     Medications: Pertinent medications reviewed and updated  Current Outpatient Medications   Medication Sig Dispense Refill     acetaminophen (TYLENOL) 325 MG tablet Take 325-650 mg by mouth every 6 hours as needed for mild pain       [place] and [place] Prescription Monitoring Program reviewed     Allergies: Pertinent allergies reviewed     Allergies   Allergen Reactions     No Known Drug Allergies      Family History:   family history includes Alzheimer Disease in his mother; Cerebrovascular Disease in his father; Diabetes in his brother; Other Cancer in his brother.    Social history:   Social History     Socioeconomic History     Marital status: Single     Spouse name:      Number of children: 4     Years of education: Not on file     Highest education level: Not on file   Occupational History     Employer: RETIRED     Comment: [employer]   Social Needs     Financial resource strain: Not on file     Food insecurity     Worry: Not on file     Inability: Not on file     Transportation needs     Medical: Not on file     Non-medical: Not on file   Tobacco Use     Smoking status: Passive Smoke Exposure - Never Smoker     Smokeless tobacco: Never Used     Tobacco comment: occasional cigar  maybe once a month   Substance and Sexual Activity     Alcohol use: Yes     Comment: OCCASIONAL WINE     Drug use: No     Sexual activity: Not Currently   Lifestyle     Physical activity     Days per week: Not on file     Minutes per session: Not on file     Stress: Not on file   Relationships     Social connections     Talks on phone: Not on file     Gets together: Not on file     Attends Rastafari service: Not on file     Active member of club or organization: Not on file     Attends meetings of clubs or organizations: Not on  file     Relationship status: Not on file     Intimate partner violence     Fear of current or ex partner: Not on file     Emotionally abused: Not on file     Physically abused: Not on file     Forced sexual activity: Not on file   Other Topics Concern     Parent/sibling w/ CABG, MI or angioplasty before 65F 55M? Yes   Social History Narrative    Referred by Dr. Lynn                                       Mother had Alzheimer's; based on autopsy result.     Dx age 85;  age 92.                           Her mother also had Alzheimer's.         Social History     Social History Narrative    Referred by Dr. Lynn                                       Mother had Alzheimer's; based on autopsy result.     Dx age 85;  age 92.                           Her mother also had Alzheimer's.         He lives in [facility]. He is not currently working. He worked previously as a .  Smoking: cigars, but quit. Alcohol: 3-4 drinks/week. Street drugs: denies.     Review of Systems:  ROS   The 14 system ROS was reviewed from the intake questionnaire; results listed at end of note.    Physical Exam:  There were no vitals taken for this visit.    Physical Exam   Constitutional: He is oriented to person, place, and time.  He is not in acute distress.   Psychiatric: He has a normal mood and affect. His behavior is normal. Judgment and thought content normal.    Imaging:  - Procedural imaging x2 reviewed personally  - Lumbar MRI reviewed personally.  Diffuse low thoracic and lumbar spine degenerative changes including disc degeneration of multiple interspaces, prominent vertebral endplate changes at L4/L5 and multiple areas of mild to moderate central and foraminal stenosis.    Assessment:    The patient is a 84 year old male with PMHx of intermittent asthma and chronic right hip/thigh pain who was referred by Dr. Valera for evaluation of his chronic right hip/thigh pain.  The patient's pain is of uncertain  origin at this point, although based upon his history it sounds most consistent with myofascial pain.  While he does have multiple areas of spinal and neuroforaminal stenosis that could produce similar symptoms, he has limited to no response to previous injections per his report.  Ultimately, physical examination is needed to confirm or eliminate other diagnoses which can produce pain in the area of concern.    Visit Diagnoses:  - Lumbar spondylosis with stenosis  - Myofascial pain    Plan:  Work up:    - RTC for physical examination    Referrals:    - None at this time    Medications:    - No medications at this time, will defer until physical examination is completed    Therapies:    - None at this time, consider PT pending outcome of physical exam    Interventions:    - To be either completed in clinic or ordered following physical examination.    Follow up: RTC at next available appointment    Thank you for the consult.    Total time spent was 29 minutes, and more than 50% of face to face time was spent in counseling and/or coordination of care regarding principles of multidisciplinary care, medication management, and treatment options.    Gabriel Aguila MD    Department of Anesthesiology  Pain Management Division

## 2020-06-11 NOTE — TELEPHONE ENCOUNTER
[facility] Call Center    Phone Message    May a detailed message be left on voicemail: yes     Reason for Call: Other: Patient has an appointment today but does not have video capability. Was not able to change it to a telephone appointment. Patient would like to keep appointment time and date but wants to change it to a telephone appointment. Please call patient to help him change it. Thank you.      Action Taken: Message routed to:  [facility] ([facility]): Pain    Travel Screening: Not Applicable

## 2020-06-11 NOTE — PATIENT INSTRUCTIONS
"Medications:    Trial using Over the counter NSAIDS for pain relief.     Please ask your local pharmacist for suggestions if you need further guidance.       Recommended Follow up:      We have you scheduled for an IN PERSON appointment on 6/23/20 at 11:30 am, for an \"In clinic injection\" with Dr. Padilla.       To speak with a nurse, schedule/reschedule/cancel a clinic appointment, or request a medication refill call: [phone]    You can also reach us by [employer]: [email or web address]                                        "

## 2020-06-23 ENCOUNTER — OFFICE VISIT (OUTPATIENT)
Dept: ANESTHESIOLOGY | Facility: CLINIC | Age: 85
End: 2020-06-23
Payer: COMMERCIAL

## 2020-06-23 VITALS
HEIGHT: 66 IN | WEIGHT: 164 LBS | HEART RATE: 63 BPM | BODY MASS INDEX: 26.36 KG/M2 | SYSTOLIC BLOOD PRESSURE: 108 MMHG | DIASTOLIC BLOOD PRESSURE: 50 MMHG | RESPIRATION RATE: 16 BRPM

## 2020-06-23 DIAGNOSIS — M70.61 GREATER TROCHANTERIC BURSITIS OF RIGHT HIP: Primary | ICD-10-CM

## 2020-06-23 ASSESSMENT — MIFFLIN-ST. JEOR: SCORE: 1386.65

## 2020-06-23 ASSESSMENT — PAIN SCALES - GENERAL: PAINLEVEL: MILD PAIN (3)

## 2020-06-23 NOTE — PATIENT INSTRUCTIONS
Treatment planning:    Right Sided Greater Trochanteric Bursa joint injection done today with Steroid.     Call our office in several weeks to let our office know how this is working for you. [phone].    Call us with any concerns for infection: redness and drainage at the injection site, fever, chills, sweats        Recommended Follow up:  2-3 months or earlier if indicated.        Please call [phone] to schedule this appointment if you don't already have an appointment made.         To speak with a nurse, schedule/reschedule/cancel a clinic appointment, or request a medication refill call: [phone]    You can also reach us by [employer]: [email or web address]

## 2020-06-23 NOTE — LETTER
6/23/2020       RE: Sharif Kumar  [address] [facility] [id number]     Dear Colleague,    Thank you for referring your patient, Sharif Kumar, to the [facility] FOR COMPREHENSIVE PAIN MANAGEMENT at [facility]. Please see a copy of my visit note below.    COMPREHENSIVE PAIN CLINIC FOLLOW UP EVALUATION  06/23/20  VISIT RECOMMENDATIONS:  - Greater trochanteric bursa injection with steroid performed in clinic today  - Patient given options for OTC NSAID medications for treatment of bursitis  - Pending response to greater trochanteric bursa injection, consider physical therapy referral    Interval History:  The patient is a 84 year old male with a PMHx of intermittent asthma and chronic right hip pain who returns to clinic today for continued evaluation of right hip/thigh pain.  He presents today for physical examination and any indicated procedures based upon physical exam findings.  He reports that he has walked approximately 1 mile before his visit in an attempt to flare his pain, which is currently at 3/10.    Previous Interval History on 06/11/20:  The patient is a 84 year old male with past medical history of intermittent asthma and chronic right hip pain who presents via telephone for evaluation of right hip/thigh pain.  The patient's pain began 2-3 years ago following several road trips and has been somewhat improved since that time.  He reports that his pain is located primarily in right anterolateral thigh and does not radiate..  The patient describes the pain as aching.  He reports that the pain is made worse by driving, prolonged standing, walking.  His pain is improved with chiropractic and ice, and resolves quickly with sitting.  He denies any weakness or numbness of the leg associated with the pain.  He denies any new problems with falls or balance, any new bowel or bladder incontinence, any night sweats or unexplained fevers, or any sudden or  unexpected weight loss.  The patient's pain does not have a significant temporal component.  He rates his average pain score at 0/10, but it can be as low as 2/10 or as severe as 7/10.     Previous recommendations from visit on 06/11/20 include:  - RTC for physical examination and TPI vs greater trochanter injection as indicated by examination.    Current Treatments:  - Acetaminophen PRN    [place] Board of Pharmacy Data Base Reviewed:    YES;     Allergies reviewed:   Allergies   Allergen Reactions     No Known Drug Allergies      Medications reviewed: Pertinent medications reviewed and updated  Current Outpatient Medications   Medication     acetaminophen (TYLENOL) 325 MG tablet     No current facility-administered medications for this visit.      Medical history reviewed:   Patient Active Problem List   Diagnosis     DIS OF GALLBLADDER NEC-- polyp -- 2/2007     SUBARACHNOID HEMORRHAGE-4/2007     BPH (benign prostatic hyperplasia)     Hyperlipidemia     Counseling on health care directive     Intermittent asthma     Preventive measure     Family history of colonic polyps     Unsteady gait     Dizziness     Cough     Headache     Fatigue     Dermatitis     Pain of right lower extremity     Pain of right thigh     Chronic midline low back pain with right-sided sciatica     Chronic sinusitis, unspecified location     Greater trochanteric bursitis of right hip     Memory loss     Macular degeneration (senile) of retina     Review of Systems:  The 14 system ROS was reviewed from the intake questionnaire; results listed at end of note.    Physical Exam:  There were no vitals taken for this visit.    Physical Exam   Constitutional: Patient is oriented to person, place, and time.  Patient appears well-developed and well-nourished. No distress.   HENT:   Head: Normocephalic and atraumatic.   Eyes: Pupils are equal, round, and reactive to light.  Patient wears glasses.  EOM are normal. No scleral icterus.   Neck: Normal  range of motion. Neck supple.   Cardiovascular: Normal rate and regular rhythm.   Pulmonary/Chest: Effort normal. No respiratory distress.   Abdominal: deferred  Genitourinary: deferred  Neuromuscular: Gait normal  MSK: There is TTP over the right greater trochanter.  There is no TTP over the mid to distal IT band.  There is no TTP over the right TFL.    Neurological: He is alert and oriented to person, place, and time. No cranial nerve deficit. Coordination normal.   Skin: Skin is warm and dry. he is not diaphoretic.   Psychiatric: He has a normal mood and affect. His behavior is normal. Judgment and thought content normal.    Imaging:  No new pertinent imaging since the patient's last visit available for review    Assessment:    The patient is a 84 year old male with PMHx of intermittent asthma and chronic right hip/thigh pain who returns to clinic today for ongoing management of his chronic right hip/thigh pain.  Based upon his previous history, his pain was suspected to be related to extra-articular hip pathology either involving the muscles, tendons, or bursa of the hip.  Based upon his examination today, particularly that his typical pain localizes primarily to an area over the right greater trochanteric bursa, bursitis of this location is the most likely etiology, particularly in the absence of pain or tenderness in the gluteus, TFL, or IT band.  Greater trochanter bursa injection was performed in clinic today, and the patient was given guidelines for OTC NSAID medications.  We may also consider PT pending the outcome from today's injection.    Visit Diagnoses:  Right greater trochanteric bursitis    Plan:  Work up:    - None at this time, await outcomes from today's injection    Referrals:    - None at this time    Medications:    - Patient advised regarding OTC NSAIDS    Therapies:    - Consider PT referral pending injection outcome    Interventions:    - Right greater trochanteric bursa steroid injection  performed in clinic today    Follow up: 2-3 months or sooner if needed.    PAIN MEDICINE CLINIC PROCEDURE NOTE    ATTENDING CLINICIAN:    Gabriel Aguila MD    ASSISTANT CLINICIAN:  none    PREPROCEDURE DIAGNOSES:  1.  Right hip pain   2.  Right Greater trochanteric bursitis    POSTPROCEDURE DIAGNOSES:  1.  Right hip pain   2.  Right Greater trochanteric bursitis      PROCEDURE(S) PERFORMED:  1.  Right greater trochanteric bursa injection with steroid    MEDICATIONS ADMINISTERED:  Bupivacaine 0.25%  Dexamethasone 4mg/mL: 1mL    ANESTHESIA:  Local.    BLOOD LOSS:  Minimal.    COMPLICATIONS:  None.    Procedure Details:  Written informed consent was obtained and saved in the electronic medical record, after the risks, benefits, and alternatives were discussed with the patient.  All of the patient s questions were answered.  The patient also confirmed his name, medical record number and date of birth.      The patient was placed in the left lateral decubitus position on the exam table.  Utilizing landmarks and palpation, the right greater trochanter was identified and the area of maximal tenderness/pain was identified by palpation and marked.  A wide chlorhexidine prep was completed of the area surrounding the identified point.    Using clean technique, a 1.5 in, 25g needle was then advanced perpendicular to the skin until it rested on the greater trochanter and then withdrawn 1-2 mm.  After negative aspiration, approximately 1/4 of the above listed medication was injected.  The needle was then partially withdrawn and redirected in the cranial, caudal, and posterior directions.  At each of these sites, utilizing the technique described above, the remainder of the above listed medication was distributed evenly.    Light pressure was held at the puncture site(s) to prevent ecchymosis and oozing.  The patient's skin was cleansed, and hemostasis was confirmed.  Band-aids were applied to the needle injection site(s).       Condition:    The patient remained awake and alert throughout the procedure.  The patient tolerated the procedure well.  There were no immediate post procedure complications noted.  The patient was then discharged to home as per protocol.    Pre-procedure pain score: 3/10  Post-procedure pain score: 1.5/10    Gabriel Aguila MD    Department of Anesthesiology  Pain Management Division    LPN reviewed AVS with Pt.  Pt verbalized an understanding of information, and was asked to contact clinic with questions.    Follow up recommended by provider: 2-3 months.     Pt was advised to call our office back in several weeks to let us know how the injection is working for him.     Teresa Christina LPN

## 2020-06-23 NOTE — PROGRESS NOTES
COMPREHENSIVE PAIN CLINIC FOLLOW UP EVALUATION  06/23/20  VISIT RECOMMENDATIONS:  - Greater trochanteric bursa injection with steroid performed in clinic today  - Patient given options for OTC NSAID medications for treatment of bursitis  - Pending response to greater trochanteric bursa injection, consider physical therapy referral    Interval History:  The patient is a 84 year old male with a PMHx of intermittent asthma and chronic right hip pain who returns to clinic today for continued evaluation of right hip/thigh pain.  He presents today for physical examination and any indicated procedures based upon physical exam findings.  He reports that he has walked approximately 1 mile before his visit in an attempt to flare his pain, which is currently at 3/10.    Previous Interval History on 06/11/20:  The patient is a 84 year old male with past medical history of intermittent asthma and chronic right hip pain who presents via telephone for evaluation of right hip/thigh pain.  The patient's pain began 2-3 years ago following several road trips and has been somewhat improved since that time.  He reports that his pain is located primarily in right anterolateral thigh and does not radiate..  The patient describes the pain as aching.  He reports that the pain is made worse by driving, prolonged standing, walking.  His pain is improved with chiropractic and ice, and resolves quickly with sitting.  He denies any weakness or numbness of the leg associated with the pain.  He denies any new problems with falls or balance, any new bowel or bladder incontinence, any night sweats or unexplained fevers, or any sudden or unexpected weight loss.  The patient's pain does not have a significant temporal component.  He rates his average pain score at 0/10, but it can be as low as 2/10 or as severe as 7/10.     Previous recommendations from visit on 06/11/20 include:  - RTC for physical examination and TPI vs greater trochanter injection  as indicated by examination.    Current Treatments:  - Acetaminophen PRN    [place] Board of Pharmacy Data Base Reviewed:    YES;     Allergies reviewed:   Allergies   Allergen Reactions     No Known Drug Allergies      Medications reviewed: Pertinent medications reviewed and updated  Current Outpatient Medications   Medication     acetaminophen (TYLENOL) 325 MG tablet     No current facility-administered medications for this visit.      Medical history reviewed:   Patient Active Problem List   Diagnosis     DIS OF GALLBLADDER NEC-- polyp -- 2/2007     SUBARACHNOID HEMORRHAGE-4/2007     BPH (benign prostatic hyperplasia)     Hyperlipidemia     Counseling on health care directive     Intermittent asthma     Preventive measure     Family history of colonic polyps     Unsteady gait     Dizziness     Cough     Headache     Fatigue     Dermatitis     Pain of right lower extremity     Pain of right thigh     Chronic midline low back pain with right-sided sciatica     Chronic sinusitis, unspecified location     Greater trochanteric bursitis of right hip     Memory loss     Macular degeneration (senile) of retina     Review of Systems:  The 14 system ROS was reviewed from the intake questionnaire; results listed at end of note.    Physical Exam:  There were no vitals taken for this visit.    Physical Exam   Constitutional: Patient is oriented to person, place, and time.  Patient appears well-developed and well-nourished. No distress.   HENT:   Head: Normocephalic and atraumatic.   Eyes: Pupils are equal, round, and reactive to light.  Patient wears glasses.  EOM are normal. No scleral icterus.   Neck: Normal range of motion. Neck supple.   Cardiovascular: Normal rate and regular rhythm.   Pulmonary/Chest: Effort normal. No respiratory distress.   Abdominal: deferred  Genitourinary: deferred  Neuromuscular: Gait normal  MSK: There is TTP over the right greater trochanter.  There is no TTP over the mid to distal IT band.   There is no TTP over the right TFL.    Neurological: He is alert and oriented to person, place, and time. No cranial nerve deficit. Coordination normal.   Skin: Skin is warm and dry. he is not diaphoretic.   Psychiatric: He has a normal mood and affect. His behavior is normal. Judgment and thought content normal.    Imaging:  No new pertinent imaging since the patient's last visit available for review    Assessment:    The patient is a 84 year old male with PMHx of intermittent asthma and chronic right hip/thigh pain who returns to clinic today for ongoing management of his chronic right hip/thigh pain.  Based upon his previous history, his pain was suspected to be related to extra-articular hip pathology either involving the muscles, tendons, or bursa of the hip.  Based upon his examination today, particularly that his typical pain localizes primarily to an area over the right greater trochanteric bursa, bursitis of this location is the most likely etiology, particularly in the absence of pain or tenderness in the gluteus, TFL, or IT band.  Greater trochanter bursa injection was performed in clinic today, and the patient was given guidelines for OTC NSAID medications.  We may also consider PT pending the outcome from today's injection.    Visit Diagnoses:  Right greater trochanteric bursitis    Plan:  Work up:    - None at this time, await outcomes from today's injection    Referrals:    - None at this time    Medications:    - Patient advised regarding OTC NSAIDS    Therapies:    - Consider PT referral pending injection outcome    Interventions:    - Right greater trochanteric bursa steroid injection performed in clinic today    Follow up: 2-3 months or sooner if needed.    PAIN MEDICINE CLINIC PROCEDURE NOTE    ATTENDING CLINICIAN:    Gabriel Aguila MD    ASSISTANT CLINICIAN:  none    PREPROCEDURE DIAGNOSES:  1.  Right hip pain   2.  Right Greater trochanteric bursitis    POSTPROCEDURE DIAGNOSES:  1.  Right hip  pain   2.  Right Greater trochanteric bursitis      PROCEDURE(S) PERFORMED:  1.  Right greater trochanteric bursa injection with steroid    MEDICATIONS ADMINISTERED:  Bupivacaine 0.25%  Dexamethasone 4mg/mL: 1mL    ANESTHESIA:  Local.    BLOOD LOSS:  Minimal.    COMPLICATIONS:  None.    Procedure Details:  Written informed consent was obtained and saved in the electronic medical record, after the risks, benefits, and alternatives were discussed with the patient.  All of the patient s questions were answered.  The patient also confirmed his name, medical record number and date of birth.      The patient was placed in the left lateral decubitus position on the exam table.  Utilizing landmarks and palpation, the right greater trochanter was identified and the area of maximal tenderness/pain was identified by palpation and marked.  A wide chlorhexidine prep was completed of the area surrounding the identified point.    Using clean technique, a 1.5 in, 25g needle was then advanced perpendicular to the skin until it rested on the greater trochanter and then withdrawn 1-2 mm.  After negative aspiration, approximately 1/4 of the above listed medication was injected.  The needle was then partially withdrawn and redirected in the cranial, caudal, and posterior directions.  At each of these sites, utilizing the technique described above, the remainder of the above listed medication was distributed evenly.    Light pressure was held at the puncture site(s) to prevent ecchymosis and oozing.  The patient's skin was cleansed, and hemostasis was confirmed.  Band-aids were applied to the needle injection site(s).      Condition:    The patient remained awake and alert throughout the procedure.  The patient tolerated the procedure well.  There were no immediate post procedure complications noted.  The patient was then discharged to home as per protocol.    Pre-procedure pain score: 3/10  Post-procedure pain score: 1.5/10    Gabriel  MD [place]    Department of Anesthesiology  Pain Management Division

## 2020-06-23 NOTE — PROGRESS NOTES
LPN reviewed AVS with Pt.  Pt verbalized an understanding of information, and was asked to contact clinic with questions.    Follow up recommended by provider: 2-3 months.     Pt was advised to call our office back in several weeks to let us know how the injection is working for him.     Teresa Christina LPN

## 2020-07-09 ENCOUNTER — TELEPHONE (OUTPATIENT)
Dept: ANESTHESIOLOGY | Facility: CLINIC | Age: 85
End: 2020-07-09

## 2020-07-09 NOTE — TELEPHONE ENCOUNTER
[facility] Call [facility]    Phone Message    May a detailed message be left on voicemail: yes     Reason for Call: Other: Pt would like the Dr to know after 2 weeks of his injection thingsw have improved but still aches and has to sit after not long of standing. PLease reach out to pt to discuss     Action Taken: Message routed to:  [facility] ([facility]): Pain    Travel Screening: Not Applicable

## 2020-07-30 ENCOUNTER — HOSPITAL ENCOUNTER (INPATIENT)
Facility: CLINIC | Age: 85
LOS: 5 days | Discharge: HOME | DRG: 340 | End: 2020-08-04
Attending: EMERGENCY MEDICINE | Admitting: SURGERY
Payer: COMMERCIAL

## 2020-07-30 ENCOUNTER — SURGERY (OUTPATIENT)
Age: 85
End: 2020-07-30
Payer: COMMERCIAL

## 2020-07-30 ENCOUNTER — ANESTHESIA (OUTPATIENT)
Dept: SURGERY | Facility: CLINIC | Age: 85
DRG: 340 | End: 2020-07-30
Payer: COMMERCIAL

## 2020-07-30 ENCOUNTER — ANESTHESIA EVENT (OUTPATIENT)
Dept: SURGERY | Facility: CLINIC | Age: 85
DRG: 340 | End: 2020-07-30
Payer: COMMERCIAL

## 2020-07-30 ENCOUNTER — APPOINTMENT (OUTPATIENT)
Dept: GENERAL RADIOLOGY | Facility: CLINIC | Age: 85
DRG: 340 | End: 2020-07-30
Attending: EMERGENCY MEDICINE
Payer: COMMERCIAL

## 2020-07-30 ENCOUNTER — APPOINTMENT (OUTPATIENT)
Dept: CT IMAGING | Facility: CLINIC | Age: 85
DRG: 340 | End: 2020-07-30
Attending: EMERGENCY MEDICINE
Payer: COMMERCIAL

## 2020-07-30 DIAGNOSIS — K37 APPENDICITIS, UNSPECIFIED APPENDICITIS TYPE: ICD-10-CM

## 2020-07-30 DIAGNOSIS — G89.18 ACUTE POST-OPERATIVE PAIN: Primary | ICD-10-CM

## 2020-07-30 DIAGNOSIS — K35.32 RUPTURED APPENDICITIS: ICD-10-CM

## 2020-07-30 LAB
ALBUMIN SERPL-MCNC: 3.4 G/DL (ref 3.4–5)
ALBUMIN UR-MCNC: 10 MG/DL
ALP SERPL-CCNC: 62 U/L (ref 40–150)
ALT SERPL W P-5'-P-CCNC: 25 U/L (ref 0–70)
ANION GAP SERPL CALCULATED.3IONS-SCNC: 8 MMOL/L (ref 3–14)
APPEARANCE UR: CLEAR
AST SERPL W P-5'-P-CCNC: 18 U/L (ref 0–45)
BASOPHILS # BLD AUTO: 0 10E9/L (ref 0–0.2)
BASOPHILS NFR BLD AUTO: 0.1 %
BILIRUB SERPL-MCNC: 1.4 MG/DL (ref 0.2–1.3)
BILIRUB UR QL STRIP: NEGATIVE
BUN SERPL-MCNC: 16 MG/DL (ref 7–30)
CALCIUM SERPL-MCNC: 8.4 MG/DL (ref 8.5–10.1)
CHLORIDE SERPL-SCNC: 105 MMOL/L (ref 94–109)
CO2 SERPL-SCNC: 23 MMOL/L (ref 20–32)
COLOR UR AUTO: YELLOW
CREAT SERPL-MCNC: 1.32 MG/DL (ref 0.66–1.25)
DIFFERENTIAL METHOD BLD: ABNORMAL
EOSINOPHIL # BLD AUTO: 0 10E9/L (ref 0–0.7)
EOSINOPHIL NFR BLD AUTO: 0 %
ERYTHROCYTE [DISTWIDTH] IN BLOOD BY AUTOMATED COUNT: 13.9 % (ref 10–15)
GFR SERPL CREATININE-BSD FRML MDRD: 50 ML/MIN/{1.73_M2}
GLUCOSE SERPL-MCNC: 157 MG/DL (ref 70–99)
GLUCOSE UR STRIP-MCNC: NEGATIVE MG/DL
HCT VFR BLD AUTO: 42.9 % (ref 40–53)
HGB BLD-MCNC: 14.3 G/DL (ref 13.3–17.7)
HGB UR QL STRIP: NEGATIVE
IMM GRANULOCYTES # BLD: 0 10E9/L (ref 0–0.4)
IMM GRANULOCYTES NFR BLD: 0.3 %
INTERPRETATION ECG - MUSE: NORMAL
KETONES UR STRIP-MCNC: 10 MG/DL
LABORATORY COMMENT REPORT: NORMAL
LEUKOCYTE ESTERASE UR QL STRIP: NEGATIVE
LIPASE SERPL-CCNC: 51 U/L (ref 73–393)
LYMPHOCYTES # BLD AUTO: 0.8 10E9/L (ref 0.8–5.3)
LYMPHOCYTES NFR BLD AUTO: 5.9 %
MCH RBC QN AUTO: 31.5 PG (ref 26.5–33)
MCHC RBC AUTO-ENTMCNC: 33.3 G/DL (ref 31.5–36.5)
MCV RBC AUTO: 95 FL (ref 78–100)
MONOCYTES # BLD AUTO: 0.9 10E9/L (ref 0–1.3)
MONOCYTES NFR BLD AUTO: 6.5 %
MUCOUS THREADS #/AREA URNS LPF: PRESENT /LPF
NEUTROPHILS # BLD AUTO: 12.4 10E9/L (ref 1.6–8.3)
NEUTROPHILS NFR BLD AUTO: 87.2 %
NITRATE UR QL: NEGATIVE
NRBC # BLD AUTO: 0 10*3/UL
NRBC BLD AUTO-RTO: 0 /100
PH UR STRIP: 5.5 PH (ref 5–7)
PLATELET # BLD AUTO: 187 10E9/L (ref 150–450)
POTASSIUM SERPL-SCNC: 3.8 MMOL/L (ref 3.4–5.3)
PROT SERPL-MCNC: 7.2 G/DL (ref 6.8–8.8)
RBC # BLD AUTO: 4.54 10E12/L (ref 4.4–5.9)
RBC #/AREA URNS AUTO: <1 /HPF (ref 0–2)
SARS-COV-2 RNA SPEC QL NAA+PROBE: NEGATIVE
SARS-COV-2 RNA SPEC QL NAA+PROBE: NORMAL
SODIUM SERPL-SCNC: 136 MMOL/L (ref 133–144)
SOURCE: ABNORMAL
SP GR UR STRIP: >1.035 (ref 1–1.03)
SPECIMEN SOURCE: NORMAL
SPECIMEN SOURCE: NORMAL
UROBILINOGEN UR STRIP-MCNC: NORMAL MG/DL (ref 0–2)
WBC # BLD AUTO: 14.2 10E9/L (ref 4–11)
WBC #/AREA URNS AUTO: <1 /HPF (ref 0–5)

## 2020-07-30 PROCEDURE — 71046 X-RAY EXAM CHEST 2 VIEWS: CPT

## 2020-07-30 PROCEDURE — 12000000 ZZH R&B MED SURG/OB

## 2020-07-30 PROCEDURE — 25800025 ZZH RX 258: Performed by: SURGERY

## 2020-07-30 PROCEDURE — 25800030 ZZH RX IP 258 OP 636: Performed by: PHYSICIAN ASSISTANT

## 2020-07-30 PROCEDURE — 44970 LAPAROSCOPY APPENDECTOMY: CPT | Performed by: SURGERY

## 2020-07-30 PROCEDURE — 25000566 ZZH SEVOFLURANE, EA 15 MIN: Performed by: SURGERY

## 2020-07-30 PROCEDURE — U0003 INFECTIOUS AGENT DETECTION BY NUCLEIC ACID (DNA OR RNA); SEVERE ACUTE RESPIRATORY SYNDROME CORONAVIRUS 2 (SARS-COV-2) (CORONAVIRUS DISEASE [COVID-19]), AMPLIFIED PROBE TECHNIQUE, MAKING USE OF HIGH THROUGHPUT TECHNOLOGIES AS DESCRIBED BY CMS-2020-01-R: HCPCS | Performed by: EMERGENCY MEDICINE

## 2020-07-30 PROCEDURE — 96361 HYDRATE IV INFUSION ADD-ON: CPT

## 2020-07-30 PROCEDURE — 25800030 ZZH RX IP 258 OP 636: Performed by: NURSE ANESTHETIST, CERTIFIED REGISTERED

## 2020-07-30 PROCEDURE — C9803 HOPD COVID-19 SPEC COLLECT: HCPCS

## 2020-07-30 PROCEDURE — 83690 ASSAY OF LIPASE: CPT | Performed by: EMERGENCY MEDICINE

## 2020-07-30 PROCEDURE — 44970 LAPAROSCOPY APPENDECTOMY: CPT | Mod: AS | Performed by: PHYSICIAN ASSISTANT

## 2020-07-30 PROCEDURE — 40000170 ZZH STATISTIC PRE-PROCEDURE ASSESSMENT II: Performed by: SURGERY

## 2020-07-30 PROCEDURE — 74177 CT ABD & PELVIS W/CONTRAST: CPT

## 2020-07-30 PROCEDURE — 71000012 ZZH RECOVERY PHASE 1 LEVEL 1 FIRST HR: Performed by: SURGERY

## 2020-07-30 PROCEDURE — 25000132 ZZH RX MED GY IP 250 OP 250 PS 637

## 2020-07-30 PROCEDURE — 25000125 ZZHC RX 250: Performed by: SURGERY

## 2020-07-30 PROCEDURE — 36000056 ZZH SURGERY LEVEL 3 1ST 30 MIN: Performed by: SURGERY

## 2020-07-30 PROCEDURE — 25000132 ZZH RX MED GY IP 250 OP 250 PS 637: Performed by: PHYSICIAN ASSISTANT

## 2020-07-30 PROCEDURE — 93005 ELECTROCARDIOGRAM TRACING: CPT

## 2020-07-30 PROCEDURE — 81001 URINALYSIS AUTO W/SCOPE: CPT | Performed by: EMERGENCY MEDICINE

## 2020-07-30 PROCEDURE — 37000008 ZZH ANESTHESIA TECHNICAL FEE, 1ST 30 MIN: Performed by: SURGERY

## 2020-07-30 PROCEDURE — 25800030 ZZH RX IP 258 OP 636: Performed by: ANESTHESIOLOGY

## 2020-07-30 PROCEDURE — 27210794 ZZH OR GENERAL SUPPLY STERILE: Performed by: SURGERY

## 2020-07-30 PROCEDURE — 80053 COMPREHEN METABOLIC PANEL: CPT | Performed by: EMERGENCY MEDICINE

## 2020-07-30 PROCEDURE — 25000128 H RX IP 250 OP 636: Performed by: EMERGENCY MEDICINE

## 2020-07-30 PROCEDURE — 25000128 H RX IP 250 OP 636: Performed by: PHYSICIAN ASSISTANT

## 2020-07-30 PROCEDURE — 37000009 ZZH ANESTHESIA TECHNICAL FEE, EACH ADDTL 15 MIN: Performed by: SURGERY

## 2020-07-30 PROCEDURE — 25000128 H RX IP 250 OP 636: Performed by: NURSE ANESTHETIST, CERTIFIED REGISTERED

## 2020-07-30 PROCEDURE — 25000125 ZZHC RX 250: Performed by: NURSE ANESTHETIST, CERTIFIED REGISTERED

## 2020-07-30 PROCEDURE — 96375 TX/PRO/DX INJ NEW DRUG ADDON: CPT

## 2020-07-30 PROCEDURE — 36000058 ZZH SURGERY LEVEL 3 EA 15 ADDTL MIN: Performed by: SURGERY

## 2020-07-30 PROCEDURE — 99285 EMERGENCY DEPT VISIT HI MDM: CPT | Mod: 25

## 2020-07-30 PROCEDURE — 25000125 ZZHC RX 250: Performed by: EMERGENCY MEDICINE

## 2020-07-30 PROCEDURE — 25800030 ZZH RX IP 258 OP 636: Performed by: EMERGENCY MEDICINE

## 2020-07-30 PROCEDURE — 88304 TISSUE EXAM BY PATHOLOGIST: CPT | Mod: 26 | Performed by: SURGERY

## 2020-07-30 PROCEDURE — 96368 THER/DIAG CONCURRENT INF: CPT

## 2020-07-30 PROCEDURE — 88304 TISSUE EXAM BY PATHOLOGIST: CPT | Performed by: SURGERY

## 2020-07-30 PROCEDURE — 96365 THER/PROPH/DIAG IV INF INIT: CPT

## 2020-07-30 PROCEDURE — 0DTJ4ZZ RESECTION OF APPENDIX, PERCUTANEOUS ENDOSCOPIC APPROACH: ICD-10-PCS | Performed by: SURGERY

## 2020-07-30 PROCEDURE — 85025 COMPLETE CBC W/AUTO DIFF WBC: CPT | Performed by: EMERGENCY MEDICINE

## 2020-07-30 RX ORDER — SODIUM CHLORIDE 9 MG/ML
INJECTION, SOLUTION INTRAVENOUS CONTINUOUS
Status: DISCONTINUED | OUTPATIENT
Start: 2020-07-30 | End: 2020-07-30

## 2020-07-30 RX ORDER — SODIUM CHLORIDE, SODIUM LACTATE, POTASSIUM CHLORIDE, CALCIUM CHLORIDE 600; 310; 30; 20 MG/100ML; MG/100ML; MG/100ML; MG/100ML
INJECTION, SOLUTION INTRAVENOUS CONTINUOUS
Status: DISCONTINUED | OUTPATIENT
Start: 2020-07-30 | End: 2020-07-30 | Stop reason: HOSPADM

## 2020-07-30 RX ORDER — LIDOCAINE 40 MG/G
CREAM TOPICAL
Status: DISCONTINUED | OUTPATIENT
Start: 2020-07-30 | End: 2020-08-04 | Stop reason: HOSPADM

## 2020-07-30 RX ORDER — FENTANYL CITRATE 50 UG/ML
25-50 INJECTION, SOLUTION INTRAMUSCULAR; INTRAVENOUS
Status: DISCONTINUED | OUTPATIENT
Start: 2020-07-30 | End: 2020-07-30 | Stop reason: HOSPADM

## 2020-07-30 RX ORDER — OXYCODONE HYDROCHLORIDE 5 MG/1
5-10 TABLET ORAL EVERY 4 HOURS PRN
Status: DISCONTINUED | OUTPATIENT
Start: 2020-07-31 | End: 2020-08-04 | Stop reason: HOSPADM

## 2020-07-30 RX ORDER — DIPHENHYDRAMINE HCL 12.5MG/5ML
12.5 LIQUID (ML) ORAL EVERY 6 HOURS PRN
Status: DISCONTINUED | OUTPATIENT
Start: 2020-07-30 | End: 2020-08-04 | Stop reason: HOSPADM

## 2020-07-30 RX ORDER — FAMOTIDINE 20 MG/1
20 TABLET, FILM COATED ORAL AT BEDTIME
Status: DISCONTINUED | OUTPATIENT
Start: 2020-07-30 | End: 2020-08-04 | Stop reason: HOSPADM

## 2020-07-30 RX ORDER — DEXAMETHASONE SODIUM PHOSPHATE 4 MG/ML
INJECTION, SOLUTION INTRA-ARTICULAR; INTRALESIONAL; INTRAMUSCULAR; INTRAVENOUS; SOFT TISSUE PRN
Status: DISCONTINUED | OUTPATIENT
Start: 2020-07-30 | End: 2020-07-30

## 2020-07-30 RX ORDER — ONDANSETRON 4 MG/1
4 TABLET, ORALLY DISINTEGRATING ORAL EVERY 6 HOURS PRN
Status: DISCONTINUED | OUTPATIENT
Start: 2020-07-30 | End: 2020-08-04 | Stop reason: HOSPADM

## 2020-07-30 RX ORDER — NALOXONE HYDROCHLORIDE 0.4 MG/ML
.1-.4 INJECTION, SOLUTION INTRAMUSCULAR; INTRAVENOUS; SUBCUTANEOUS
Status: DISCONTINUED | OUTPATIENT
Start: 2020-07-30 | End: 2020-08-04 | Stop reason: HOSPADM

## 2020-07-30 RX ORDER — HYDROMORPHONE HYDROCHLORIDE 1 MG/ML
.3-.5 INJECTION, SOLUTION INTRAMUSCULAR; INTRAVENOUS; SUBCUTANEOUS EVERY 5 MIN PRN
Status: DISCONTINUED | OUTPATIENT
Start: 2020-07-30 | End: 2020-07-30 | Stop reason: HOSPADM

## 2020-07-30 RX ORDER — ONDANSETRON 2 MG/ML
4 INJECTION INTRAMUSCULAR; INTRAVENOUS EVERY 6 HOURS PRN
Status: DISCONTINUED | OUTPATIENT
Start: 2020-07-30 | End: 2020-08-04 | Stop reason: HOSPADM

## 2020-07-30 RX ORDER — HEPARIN SODIUM 5000 [USP'U]/.5ML
5000 INJECTION, SOLUTION INTRAVENOUS; SUBCUTANEOUS EVERY 8 HOURS
Status: DISCONTINUED | OUTPATIENT
Start: 2020-07-31 | End: 2020-08-04 | Stop reason: HOSPADM

## 2020-07-30 RX ORDER — MAGNESIUM HYDROXIDE 1200 MG/15ML
LIQUID ORAL PRN
Status: DISCONTINUED | OUTPATIENT
Start: 2020-07-30 | End: 2020-07-30 | Stop reason: HOSPADM

## 2020-07-30 RX ORDER — FAMOTIDINE 20 MG/1
20 TABLET, FILM COATED ORAL 2 TIMES DAILY
Status: DISCONTINUED | OUTPATIENT
Start: 2020-07-30 | End: 2020-07-30

## 2020-07-30 RX ORDER — NEOSTIGMINE METHYLSULFATE 1 MG/ML
VIAL (ML) INJECTION PRN
Status: DISCONTINUED | OUTPATIENT
Start: 2020-07-30 | End: 2020-07-30

## 2020-07-30 RX ORDER — PROPOFOL 10 MG/ML
INJECTION, EMULSION INTRAVENOUS PRN
Status: DISCONTINUED | OUTPATIENT
Start: 2020-07-30 | End: 2020-07-30

## 2020-07-30 RX ORDER — LABETALOL HYDROCHLORIDE 5 MG/ML
10 INJECTION, SOLUTION INTRAVENOUS
Status: DISCONTINUED | OUTPATIENT
Start: 2020-07-30 | End: 2020-07-30 | Stop reason: HOSPADM

## 2020-07-30 RX ORDER — FENTANYL CITRATE 50 UG/ML
INJECTION, SOLUTION INTRAMUSCULAR; INTRAVENOUS PRN
Status: DISCONTINUED | OUTPATIENT
Start: 2020-07-30 | End: 2020-07-30

## 2020-07-30 RX ORDER — NALOXONE HYDROCHLORIDE 0.4 MG/ML
.1-.4 INJECTION, SOLUTION INTRAMUSCULAR; INTRAVENOUS; SUBCUTANEOUS
Status: ACTIVE | OUTPATIENT
Start: 2020-07-30 | End: 2020-07-31

## 2020-07-30 RX ORDER — DIPHENHYDRAMINE HYDROCHLORIDE 50 MG/ML
12.5 INJECTION INTRAMUSCULAR; INTRAVENOUS EVERY 6 HOURS PRN
Status: DISCONTINUED | OUTPATIENT
Start: 2020-07-30 | End: 2020-08-04 | Stop reason: HOSPADM

## 2020-07-30 RX ORDER — PROCHLORPERAZINE MALEATE 5 MG
5 TABLET ORAL EVERY 6 HOURS PRN
Status: DISCONTINUED | OUTPATIENT
Start: 2020-07-30 | End: 2020-08-04 | Stop reason: HOSPADM

## 2020-07-30 RX ORDER — ACETAMINOPHEN 325 MG/1
650 TABLET ORAL EVERY 4 HOURS PRN
Status: DISCONTINUED | OUTPATIENT
Start: 2020-08-02 | End: 2020-07-31

## 2020-07-30 RX ORDER — AMOXICILLIN 250 MG
1 CAPSULE ORAL 2 TIMES DAILY
Status: DISCONTINUED | OUTPATIENT
Start: 2020-07-30 | End: 2020-08-03

## 2020-07-30 RX ORDER — ONDANSETRON 2 MG/ML
4 INJECTION INTRAMUSCULAR; INTRAVENOUS EVERY 30 MIN PRN
Status: DISCONTINUED | OUTPATIENT
Start: 2020-07-30 | End: 2020-07-30 | Stop reason: HOSPADM

## 2020-07-30 RX ORDER — DEXTROSE MONOHYDRATE, SODIUM CHLORIDE, AND POTASSIUM CHLORIDE 50; 1.49; 4.5 G/1000ML; G/1000ML; G/1000ML
INJECTION, SOLUTION INTRAVENOUS CONTINUOUS
Status: DISCONTINUED | OUTPATIENT
Start: 2020-07-30 | End: 2020-08-01

## 2020-07-30 RX ORDER — ONDANSETRON 2 MG/ML
4 INJECTION INTRAMUSCULAR; INTRAVENOUS EVERY 30 MIN PRN
Status: DISCONTINUED | OUTPATIENT
Start: 2020-07-30 | End: 2020-07-30

## 2020-07-30 RX ORDER — ONDANSETRON 4 MG/1
4 TABLET, ORALLY DISINTEGRATING ORAL EVERY 30 MIN PRN
Status: DISCONTINUED | OUTPATIENT
Start: 2020-07-30 | End: 2020-07-30 | Stop reason: HOSPADM

## 2020-07-30 RX ORDER — ONDANSETRON 2 MG/ML
INJECTION INTRAMUSCULAR; INTRAVENOUS PRN
Status: DISCONTINUED | OUTPATIENT
Start: 2020-07-30 | End: 2020-07-30

## 2020-07-30 RX ORDER — IOPAMIDOL 755 MG/ML
81 INJECTION, SOLUTION INTRAVASCULAR ONCE
Status: COMPLETED | OUTPATIENT
Start: 2020-07-30 | End: 2020-07-30

## 2020-07-30 RX ORDER — HYDROMORPHONE HYDROCHLORIDE 1 MG/ML
0.5 INJECTION, SOLUTION INTRAMUSCULAR; INTRAVENOUS; SUBCUTANEOUS
Status: DISCONTINUED | OUTPATIENT
Start: 2020-07-30 | End: 2020-07-30

## 2020-07-30 RX ORDER — HYDROMORPHONE HYDROCHLORIDE 1 MG/ML
0.2 INJECTION, SOLUTION INTRAMUSCULAR; INTRAVENOUS; SUBCUTANEOUS
Status: DISCONTINUED | OUTPATIENT
Start: 2020-07-30 | End: 2020-08-04 | Stop reason: HOSPADM

## 2020-07-30 RX ORDER — GLYCOPYRROLATE 0.2 MG/ML
INJECTION, SOLUTION INTRAMUSCULAR; INTRAVENOUS PRN
Status: DISCONTINUED | OUTPATIENT
Start: 2020-07-30 | End: 2020-07-30

## 2020-07-30 RX ORDER — PIPERACILLIN SODIUM, TAZOBACTAM SODIUM 3; .375 G/15ML; G/15ML
3.38 INJECTION, POWDER, LYOPHILIZED, FOR SOLUTION INTRAVENOUS EVERY 6 HOURS
Status: DISCONTINUED | OUTPATIENT
Start: 2020-07-30 | End: 2020-08-04 | Stop reason: HOSPADM

## 2020-07-30 RX ORDER — LIDOCAINE HYDROCHLORIDE 20 MG/ML
INJECTION, SOLUTION INFILTRATION; PERINEURAL PRN
Status: DISCONTINUED | OUTPATIENT
Start: 2020-07-30 | End: 2020-07-30

## 2020-07-30 RX ORDER — HYDRALAZINE HYDROCHLORIDE 20 MG/ML
2.5-5 INJECTION INTRAMUSCULAR; INTRAVENOUS EVERY 10 MIN PRN
Status: DISCONTINUED | OUTPATIENT
Start: 2020-07-30 | End: 2020-07-30 | Stop reason: HOSPADM

## 2020-07-30 RX ADMIN — NEOSTIGMINE METHYLSULFATE 3 MG: 1 INJECTION, SOLUTION INTRAVENOUS at 15:10

## 2020-07-30 RX ADMIN — SODIUM CHLORIDE 1000 ML: 900 IRRIGANT IRRIGATION at 14:34

## 2020-07-30 RX ADMIN — FAMOTIDINE 20 MG: 20 TABLET ORAL at 22:27

## 2020-07-30 RX ADMIN — PHENYLEPHRINE HYDROCHLORIDE 100 MCG: 10 INJECTION INTRAVENOUS at 14:33

## 2020-07-30 RX ADMIN — GLYCOPYRROLATE 0.4 MG: 0.2 INJECTION, SOLUTION INTRAMUSCULAR; INTRAVENOUS at 15:10

## 2020-07-30 RX ADMIN — PIPERACILLIN SODIUM AND TAZOBACTAM SODIUM 3.38 G: 3; .375 INJECTION, POWDER, LYOPHILIZED, FOR SOLUTION INTRAVENOUS at 22:27

## 2020-07-30 RX ADMIN — IOPAMIDOL 81 ML: 755 INJECTION, SOLUTION INTRAVENOUS at 08:48

## 2020-07-30 RX ADMIN — PROPOFOL 150 MG: 10 INJECTION, EMULSION INTRAVENOUS at 14:17

## 2020-07-30 RX ADMIN — CEFEPIME HYDROCHLORIDE 2 G: 2 INJECTION, POWDER, FOR SOLUTION INTRAVENOUS at 09:17

## 2020-07-30 RX ADMIN — SODIUM CHLORIDE 1000 ML: 9 INJECTION, SOLUTION INTRAVENOUS at 07:37

## 2020-07-30 RX ADMIN — ROCURONIUM BROMIDE 10 MG: 10 INJECTION INTRAVENOUS at 14:48

## 2020-07-30 RX ADMIN — POTASSIUM CHLORIDE, DEXTROSE MONOHYDRATE AND SODIUM CHLORIDE: 150; 5; 450 INJECTION, SOLUTION INTRAVENOUS at 18:43

## 2020-07-30 RX ADMIN — DOCUSATE SODIUM 50 MG AND SENNOSIDES 8.6 MG 1 TABLET: 8.6; 5 TABLET, FILM COATED ORAL at 22:27

## 2020-07-30 RX ADMIN — PHENYLEPHRINE HYDROCHLORIDE 150 MCG: 10 INJECTION INTRAVENOUS at 14:45

## 2020-07-30 RX ADMIN — SODIUM CHLORIDE 63 ML: 9 INJECTION, SOLUTION INTRAVENOUS at 08:49

## 2020-07-30 RX ADMIN — MIDAZOLAM 1 MG: 1 INJECTION INTRAMUSCULAR; INTRAVENOUS at 14:10

## 2020-07-30 RX ADMIN — BUPIVACAINE HYDROCHLORIDE AND EPINEPHRINE BITARTRATE 10 ML: 5; .005 INJECTION, SOLUTION EPIDURAL; INTRACAUDAL; PERINEURAL at 14:34

## 2020-07-30 RX ADMIN — PHENYLEPHRINE HYDROCHLORIDE 100 MCG: 10 INJECTION INTRAVENOUS at 14:30

## 2020-07-30 RX ADMIN — PIPERACILLIN SODIUM AND TAZOBACTAM SODIUM 3.38 G: 3; .375 INJECTION, POWDER, LYOPHILIZED, FOR SOLUTION INTRAVENOUS at 15:41

## 2020-07-30 RX ADMIN — BUPIVACAINE HYDROCHLORIDE AND EPINEPHRINE BITARTRATE 10 ML: 5; .005 INJECTION, SOLUTION EPIDURAL; INTRACAUDAL; PERINEURAL at 15:11

## 2020-07-30 RX ADMIN — FENTANYL CITRATE 50 MCG: 50 INJECTION, SOLUTION INTRAMUSCULAR; INTRAVENOUS at 14:15

## 2020-07-30 RX ADMIN — PHENYLEPHRINE HYDROCHLORIDE 100 MCG: 10 INJECTION INTRAVENOUS at 14:28

## 2020-07-30 RX ADMIN — SODIUM CHLORIDE 1000 ML: 900 IRRIGANT IRRIGATION at 14:59

## 2020-07-30 RX ADMIN — ONDANSETRON 4 MG: 2 INJECTION INTRAMUSCULAR; INTRAVENOUS at 07:37

## 2020-07-30 RX ADMIN — HYDROMORPHONE HYDROCHLORIDE 0.5 MG: 1 INJECTION, SOLUTION INTRAMUSCULAR; INTRAVENOUS; SUBCUTANEOUS at 07:37

## 2020-07-30 RX ADMIN — LIDOCAINE HYDROCHLORIDE 100 MG: 20 INJECTION, SOLUTION INFILTRATION; PERINEURAL at 14:15

## 2020-07-30 RX ADMIN — FENTANYL CITRATE 50 MCG: 50 INJECTION, SOLUTION INTRAMUSCULAR; INTRAVENOUS at 14:34

## 2020-07-30 RX ADMIN — METRONIDAZOLE 500 MG: 500 INJECTION, SOLUTION INTRAVENOUS at 09:48

## 2020-07-30 RX ADMIN — DEXAMETHASONE SODIUM PHOSPHATE 4 MG: 4 INJECTION, SOLUTION INTRA-ARTICULAR; INTRALESIONAL; INTRAMUSCULAR; INTRAVENOUS; SOFT TISSUE at 14:32

## 2020-07-30 RX ADMIN — ONDANSETRON 4 MG: 2 INJECTION INTRAMUSCULAR; INTRAVENOUS at 15:00

## 2020-07-30 RX ADMIN — SODIUM CHLORIDE, POTASSIUM CHLORIDE, SODIUM LACTATE AND CALCIUM CHLORIDE: 600; 310; 30; 20 INJECTION, SOLUTION INTRAVENOUS at 14:09

## 2020-07-30 RX ADMIN — ROCURONIUM BROMIDE 35 MG: 10 INJECTION INTRAVENOUS at 14:19

## 2020-07-30 RX ADMIN — SODIUM CHLORIDE 1000 ML: 900 IRRIGANT IRRIGATION at 14:49

## 2020-07-30 ASSESSMENT — ENCOUNTER SYMPTOMS
ABDOMINAL PAIN: 1
DIFFICULTY URINATING: 1
CHILLS: 1
VOMITING: 0
COUGH: 1
SHORTNESS OF BREATH: 1
NAUSEA: 1
APPETITE CHANGE: 1

## 2020-07-30 ASSESSMENT — MIFFLIN-ST. JEOR: SCORE: 1384.39

## 2020-07-30 ASSESSMENT — LIFESTYLE VARIABLES: TOBACCO_USE: 0

## 2020-07-30 ASSESSMENT — ACTIVITIES OF DAILY LIVING (ADL): ADLS_ACUITY_SCORE: 18

## 2020-07-30 NOTE — ANESTHESIA CARE TRANSFER NOTE
Patient: Sharif Kumar    Procedure(s):  APPENDECTOMY, LAPAROSCOPIC    Diagnosis: Appendicitis, unspecified appendicitis type [K37]  Diagnosis Additional Information: No value filed.    Anesthesia Type:   General     Note:  Airway :Face Mask  Patient transferred to:PACU  Comments: Neuromuscular blockade reversed after TOF 4/4, spontaneous respirations, adequate tidal volumes, followed commands to voice, oropharynx suctioned with soft flexible catheter, extubated atraumatically, extubated with suction, airway patent after extubation.  Oxygen via facemask at 10 liters per minute to PACU. Oxygen tubing connected to wall O2 in PACU, SpO2, NiBP, and EKG monitors and alarms on and functioning, Scott Hugger warmer connected to patient gown, report on patient's clinical status given to PACU RN, RN questions answered.   Handoff Report: Identifed the Patient, Identified the Reponsible Provider, Reviewed the pertinent medical history, Discussed the surgical course, Reviewed Intra-OP anesthesia mangement and issues during anesthesia, Set expectations for post-procedure period and Allowed opportunity for questions and acknowledgement of understanding      Vitals: (Last set prior to Anesthesia Care Transfer)    CRNA VITALS  7/30/2020 1447 - 7/30/2020 1524      7/30/2020             Resp Rate (observed):  16    EKG:  Sinus rhythm                Electronically Signed By: ALLEY Ried CRNA  July 30, 2020  3:24 PM

## 2020-07-30 NOTE — ED TRIAGE NOTES
Right abd pain since yesterday , no vomiting, nausea, sm bm yesterday , vss, IV started by EMS , ecg normal

## 2020-07-30 NOTE — H&P
"General Surgery Consultation    Sharif Kumar MRN# [id number]   Age: 84 year old YOB: 1935     Date of Admission:  7/30/2020    Reason for consult:            Abdominal pain       Requesting physician:            Dr. Yan                  Chief Complaint:   Abdominal pain     History is obtained from the patient         History of Present Illness:   This patient is a 84 year old  male who presents with right lower quadrant abdominal pain for 1 day. Reports decreased appetite and no PO intake since yesterday morning. Had chills as well. Reports nausea, no emesis. No history of similar pain. Did have issues with urinary retention this morning but was able to void 30 minutes prior. No prior abdominal surgeries.             Past Medical History:    has a past medical history of Helicobacter pylori (H. pylori), Hypertrophy (benign) of prostate, Intermittent asthma (3/20/2012), Polyp of gallbladder (3/20/2012), and Priapism.          Past Surgical History:   History reviewed. No pertinent surgical history.          Social History:     Social History     Tobacco Use     Smoking status: Passive Smoke Exposure - Never Smoker     Smokeless tobacco: Never Used     Tobacco comment: occasional cigar  maybe once a month   Substance Use Topics     Alcohol use: Yes     Comment: OCCASIONAL WINE             Family History:   Reviewed         Allergies:     Allergies   Allergen Reactions     No Known Drug Allergies              Medications:   No current facility-administered medications on file prior to encounter.   acetaminophen (TYLENOL) 325 MG tablet, Take 325-650 mg by mouth every 6 hours as needed for mild pain               Review of Systems:   A 10 point Review of Systems is negative other than noted in the HPI.          Physical Exam:   /74   Pulse 82   Temp 98.2  F (36.8  C) (Oral)   Resp 18   Ht 1.702 m (5' 7\")   Wt 72.6 kg (160 lb)   SpO2 96%   BMI 25.06 kg/m    General - Well " developed, well nourished male in no apparent distress  Psych: normal affect, pleasant  HEENT:  Head normocephalic and atraumatic, pupils equal and round, conjunctivae clear, no scleral icterus, external ears and nose normal  Neck: Supple without thyromegaly or masses  Lymphatic: No cervical, or supraclavicular lymphadenopathy  Lungs: Clear to auscultation bilaterally  Heart: regular rate and rhythm, no murmurs  Abdomen:   soft, flat, non-distended with moderate tenderness noted in the right lower quadrant. local rebound and  guarding. no masses palpated.  Extremities: Warm without edema  Neurologic: nonfocal  Psychiatric: Mood and affect appropriate  Skin: Without lesions or rashes, or juandice         Data:     Lab Results   Component Value Date    WBC 14.2 07/30/2020     Lab Results   Component Value Date    HGB 14.3 07/30/2020     Lab Results   Component Value Date     07/30/2020     Last Basic Metabolic Panel:  Lab Results   Component Value Date     07/30/2020      Lab Results   Component Value Date    POTASSIUM 3.8 07/30/2020     Lab Results   Component Value Date    CHLORIDE 105 07/30/2020     Lab Results   Component Value Date    MADISYN 8.4 07/30/2020     Lab Results   Component Value Date    CO2 23 07/30/2020     Lab Results   Component Value Date    BUN 16 07/30/2020     Lab Results   Component Value Date    CR 1.32 07/30/2020     Lab Results   Component Value Date     07/30/2020       Liver Function Studies -   Recent Labs   Lab Test 07/30/20  0733   PROTTOTAL 7.2   ALBUMIN 3.4   BILITOTAL 1.4*   ALKPHOS 62   AST 18   ALT 25       All labs and imaging was personally reviewed.     Imaging:    Results for orders placed or performed during the hospital encounter of 07/30/20   XR Chest 2 Views    Narrative    XR CHEST 2 VW 7/30/2020 8:47 AM    HISTORY: pain    COMPARISON: 6/30/2015      Impression    IMPRESSION: Mild pulmonary venous congestion. Mild patchy infiltrates  in the right lung may  represent infection. No effusions or  pneumothorax. Unchanged cardiac size. Degenerative changes in the  shoulders.    STANISLAV LEE MD   CT Abdomen Pelvis w Contrast    Narrative    CT ABDOMEN AND PELVIS WITH CONTRAST 7/30/2020 8:55 AM     HISTORY: Abdominal pain, acute, generalized.    CONTRAST DOSE:  81mL Isovue-370    Radiation dose for this scan was reduced using automated exposure  control, adjustment of the mA and/or kV according to patient size, or  iterative reconstruction technique.    FINDINGS:  There are multiple appendicoliths within an enlarged,  fluid-filled inflamed appendix with periappendiceal inflammatory  stranding. There is a small amount of extraluminal fluid separate from  the appendix and inferior to the cecum along the right paracolic  gutter. Trace peritoneal fluid is also noted adjacent to the liver. No  free peritoneal air. The descending and sigmoid colon are relatively  decompressed. Prominence of the left colonic wall is presumably  related to decompression. There is a moderate-sized hiatal hernia. The  liver, spleen, kidneys, adrenal glands, pancreas, and gallbladder  appear within normal limits. Prostatic enlargement is noted. Pelvic  contents are otherwise unremarkable.      Impression    IMPRESSION:  1. Appendicitis with multiple appendicoliths and localized  extraluminal/peritoneal fluid within the right paracolic gutter  lateral to the appendix and caudal to the cecum. Trace peritoneal  fluid is also noted adjacent to the liver.  2. Prostatic enlargement.  3. Hiatal hernia.    REBEKAH SCHNEIDER MD             Assessment and Plan:   Assessment:   Sharif Kumar is a 84 year old with acute appendicitis.       Plan:   I discussed the pathophysiology of appendicitis and the need for surgical intervention. I have also discussed the risks, benefits, complications including but not limited to bleeding, infection, possible injury to the bowel or ureter, possible anastomotic dehiscence,  possible post operative abscess, possible postoperative MI, PE, or other anesthetic complications. If one of these complications did arise the patient could require further surgery. The patient thoroughly understood the conversation and will sign consent.     Mercedes Sommer MD

## 2020-07-30 NOTE — OP NOTE
PREOPERATIVE DIAGNOSIS: Acute appendicitis.      POSTOPERATIVE DIAGNOSIS: Acute gangrenous appendicitis with perforation and pelvic abscess      PROCEDURE: Laparoscopic appendectomy.      SURGEON: Mercedes Sommer MD     ASSISTANT:  Meghan Tavarez PA-C  The physician s assistant was medically necessary for their expertise in camera management, suctioning and retraction.    ANESTHESIA: GET.     COMPLICATIONS: None.     BLOOD LOSS: Minimal.     FINDINGS: Gangrenous acute appendicitis with perforation and pelvic abscess     INDICATIONS:Sharif Kumar is a 84 year old  with one day history of abdominal pain. An abdominal CT was performed which showed acute appendicitis. I explained the risks, benefits, complications including but not limited to bleeding, infection, possible need to open, possible postop hematoma, seroma, bowel or bladder injury, all which require additional procedures. The patient did agree and did sign consent.       DETAILS OF PROCEDURE: The patient was brought to the operating room per anesthesia, placed in supine position, intubated without difficulty. They were given perioperative antibiotics.  The patient was prepped and draped in the usual fashion using ChloraPrep and the surgical timeout was then performed, verifying the correct surgeon, site, procedure and patient. All in the room were in agreement.    A 5mm 0 degree laparoscope was inserted and the visiport used to obtain entrance to the abdomen. Insufflation was connected and the abdomen insufflated. Initial evaluation of the abdomen revealed significant inflammation in the right lower quadrant and revealed no trocar injuries. There was fibrinous exudate across several loops of small bowel in the mid abdomen with inflammation of the peritoneum. There was a loop of small bowel adherent to the anterior abdominal wall in the right mid abdomen as well as purulent fluid noted above the liver and in the right paracolic gutter. The abdomen was  insufflated with pressure of 15, which the patient tolerated well, a 2nd 5mm port was placed suprapubically and a 12mm port placed infraumbilically under direct visualization. I was able to sweep the loop of bowel adherent to the peritoneum free using the suction  device. We then used blunt dissection to free up the small bowel and sigmoid colon from the appendix in the right lower quadrant. The appendix was found and appeared gangrenous with a clear perforation at the mid body of the appendix. It was adherent to the lateral wall. The base of the cecum was mildly  thickened. The terminal ileum was normal. The ligasure device was used to divide the appendiceal mesentery with excellent hemostasis. A blue load TELMA stapler was then used to divide the appendix at the base. This staple line was hemostatic.   The appendix was placed an EndoCatch bag and removed through the umbilical trocar. The area was explored. Staple lines were completely intact. There was no bleeding. The right lower quadrant was irrigated with saline and suctioned.The pelvis revealed pus along the right pelvis which had a fibrinous rind surrounding it consistent with abscess. Purulent fluid was all aspirated. Irrigation was used and aspirated until it was clear. There was a fibrinous rind above the liver and purulent fluid along the liver as well. This was also all irrigated and suctioned until clear. A 15 [place] round drain was placed via the inferior port site and secured to the skin with a 3-0 nylon suture. It was positioned in the right lower quadrant and along the right paracolic gutter. All trocars were taken out under direct visualization. The fascia was closed with an 0 vicryl sutures using the shanika myrna device in a figure of eight fashion.     Marcaine 0.5% injected to all the wounds. They were irrigated and closed with 4-0 Monocryl in subcuticular fashion.Steri strips and bandaids were applied. The patient tolerated the  procedure well and was awoken from anesthesia and transferred to PACU in stable condition. All sponge, instrument, and needle counts were correct at the conclusion of the procedure.      Mercedes Sommer MD  [facility]  [phone]

## 2020-07-30 NOTE — PHARMACY-ADMISSION MEDICATION HISTORY
Pharmacy Medication History  Admission medication history interview status for the 7/30/2020  admission is complete. See EPIC admission navigator for prior to admission medications     Medication history sources: Patient  Medication history source reliability: Good  Adherence assessment: Good    Significant changes made to the medication list:  Denies any medications except prn OTC tylenol      Additional medication history information:   none    Medication reconciliation completed by provider prior to medication history? No    Time spent in this activity: 5 minutes      Prior to Admission medications    Medication Sig Last Dose Taking? Auth Provider   acetaminophen (TYLENOL) 325 MG tablet Take 325-650 mg by mouth every 6 hours as needed for mild pain prn  Reported, Patient

## 2020-07-30 NOTE — ED NOTES
"[facility]  ED Nurse Handoff Report    ED Chief complaint: Abdominal Pain      ED Diagnosis:   Final diagnoses:   Appendicitis, unspecified appendicitis type       Code Status: not addressed     Allergies:   Allergies   Allergen Reactions     No Known Drug Allergies        Patient Story: abd pain right side started yesterday   Focused Assessment:  Right abd pain, nausea no vomiting, better after dilaudid , has been sweaty but no fevers     Treatments and/or interventions provided: dilaudid antibiotics, one liter NS   Patient's response to treatments and/or interventions: better after dilaudid     To be done/followed up on inpatient unit:  unknown     Does this patient have any cognitive concerns?: na    Activity level - Baseline/Home:  Independent  Activity Level - Current:   Independent    Patient's Preferred language: English   Needed?: No    Isolation: None  Infection: Not Applicable  Bariatric?: No    Vital Signs:   Vitals:    07/30/20 0725 07/30/20 0730 07/30/20 0745 07/30/20 0800   BP: 137/66 (!) 154/73 120/55 118/67   Pulse: 83 83 75 83   Resp: 16      Temp: 98.2  F (36.8  C)      TempSrc: Oral      SpO2: 99% 99% 98% 92%   Weight: 72.6 kg (160 lb)      Height: 1.702 m (5' 7\")          Cardiac Rhythm:     Was the PSS-3 completed:   Yes  What interventions are required if any?               Family Comments: na  OBS brochure/video discussed/provided to patient/family: No              Name of person given brochure if not patient: na              Relationship to patient: na    For the majority of the shift this patient's behavior was Green.   Behavioral interventions performed were na.    ED NURSE PHONE NUMBER: [phone]       "

## 2020-07-30 NOTE — BRIEF OP NOTE
General Surgery Brief Operative Note    Pre-operative diagnosis: Appendicitis, unspecified appendicitis type [K37]   Post-operative diagnosis Ruptured gangrenous appendicitis   Procedure: Procedure(s):  APPENDECTOMY, LAPAROSCOPIC    Surgeon(s), Assistant(s): Surgeon(s) and Role:     * Mercedes Sommer MD - Primary     * Meghan Tavarez PA-C - Assisting   Estimated blood loss: * No values recorded between 7/30/2020  2:34 PM and 7/30/2020  3:19 PM *   Drains: Gus x 1   Specimens: ID Type Source Tests Collected by Time Destination   A : APPENDIX Organ Appendix SURGICAL PATHOLOGY EXAM Mercedes Sommer MD 7/30/2020  2:56 PM       Findings: See postop diagnosis   Condition: Stable   Comments:      Meghan Tavarez PA-C See dictated operative report for full details

## 2020-07-30 NOTE — ANESTHESIA POSTPROCEDURE EVALUATION
Patient: Sharif Kumar    Procedure(s):  APPENDECTOMY, LAPAROSCOPIC    Diagnosis:Appendicitis, unspecified appendicitis type [K37]  Diagnosis Additional Information: No value filed.    Anesthesia Type:  General    Note:  Anesthesia Post Evaluation    Patient location during evaluation: PACU  Patient participation: Able to fully participate in evaluation  Level of consciousness: awake and alert  Pain management: adequate  Airway patency: patent  Cardiovascular status: acceptable  Respiratory status: acceptable and unassisted  Hydration status: acceptable  PONV: none             Last vitals:  Vitals:    07/30/20 1115 07/30/20 1145 07/30/20 1527   BP: 106/62 138/74 (!) 149/66   Pulse: 82     Resp:  18 17   Temp:   38.6  C (101.5  F)   SpO2: 96%  100%         Electronically Signed By: Zak Sarabia MD  July 30, 2020  3:28 PM

## 2020-07-30 NOTE — ED PROVIDER NOTES
History   Chief Complaint:  Abdominal Pain     The history is provided by the EMS personnel and the patient.      Sharif Kumar is a 84 year old male with history of subarachnoid hemorrhage and benign prostatic hypertrophy who presents from home via EMS with right lower quadrant abdominal pain. EMS reports the patient is coming in for right lower quadrant abdominal pain that began yesterday morning. He has been moving around a lot trying to get comfortable, and notes the pain is not exacerbated by movement or bumps on the road. He has associated nausea and chills but no vomiting. He did have one bowel movement yesterday morning but none since then. He has had a decreased appetite with his last meal yesterday morning and has been unable to urinate today. He endorses mild shortness of breath and cough. He denies daily medication or taking medication for the pain. He has no history of prior abdominal surgeries, and notes his only medical history is a subarachnoid hemorrhage 15 years ago. He further denies hypertension, hyperlipidemia, or diabetes mellitus. He does not smoke but does drink alcohol daily.      Allergies:  No Known Drug Allergies    Medications:   No daily medications.     Past Medical History:    H pylori  Hypertrophy of prostate  Intermittent asthma  Polyp of gall bladder   Priapism   Hyperlipidemia  Subarachnoid hemorrhage      Past Surgical History:    Tonsillectomy   Cataract removal     Family History:    Mother: alzheimer disease   Father: cerebrovascular disease   Brother: diabetes mellitus, cancer     Social History:  The patient was accompanied to the ED by EMS.  Smoking Status: passive smoke exposure  Smokeless Tobacco: Never Used  Alcohol Use: Yes, daily  Drug Use: No  PCP: Jose Maria Valera     Review of Systems   Constitutional: Positive for appetite change and chills.   Respiratory: Positive for cough and shortness of breath.    Gastrointestinal: Positive for abdominal pain and nausea.  "Negative for vomiting.   Genitourinary: Positive for difficulty urinating.   All other systems reviewed and are negative.    Physical Exam     Patient Vitals for the past 24 hrs:   BP Temp Temp src Pulse Resp SpO2 Height Weight   07/30/20 0800 118/67 -- -- 83 -- 92 % -- --   07/30/20 0745 120/55 -- -- 75 -- 98 % -- --   07/30/20 0730 (!) 154/73 -- -- 83 -- 99 % -- --   07/30/20 0725 137/66 98.2  F (36.8  C) Oral 83 16 99 % 1.702 m (5' 7\") 72.6 kg (160 lb)       Physical Exam  Constitutional: elderly white male, supine  HENT: No signs of trauma.   Eyes: EOM are normal. Pupils are equal, round, and reactive to light.   Neck: Normal range of motion. No JVD present. No cervical adenopathy.  Cardiovascular: Regular rhythm.  Exam reveals no gallop and no friction rub.    No murmur heard.  Pulmonary/Chest: Bilateral breath sounds normal. No wheezes, rhonchi or rales.  Abdominal: Soft. Diffusely tender with guarding, worse on right side. No rebound. 2+ femoral pulses. No inguinal hernia.   Musculoskeletal: No edema. No tenderness.   Lymphadenopathy: No lymphadenopathy.   Neurological: Alert and oriented to person, place, and time. Normal strength. Coordination normal.   Skin: Skin is warm and dry. No rash noted. No erythema.     Emergency Department Course   ECG:  ECG taken at 0728, ECG read at 0750  Normal sinus rhythm  Minimal voltage criteria for LVH, may be normal variant  Rate 83 bpm. NM interval 158 ms. QRS duration 82 ms. QT/QTc 374/439 ms. P-R-T axes 71 45 69.    Imaging:  Radiology findings were communicated with the patient who voiced understanding of the findings.    CT Abdomen Pelvis w Contrast  IMPRESSION:  1. Appendicitis with multiple appendicoliths and localized  extraluminal/peritoneal fluid within the right paracolic gutter  lateral to the appendix and caudal to the cecum. Trace peritoneal  fluid is also noted adjacent to the liver.  2. Prostatic enlargement.  3. Hiatal hernia.  Reading per radiology "     XR Chest 2 Views  IMPRESSION: Mild pulmonary venous congestion. Mild patchy infiltrates  in the right lung may represent infection. No effusions or  pneumothorax. Unchanged cardiac size. Degenerative changes in the  shoulders.  Reading per radiology     Laboratory:  Laboratory findings were communicated with the patient who voiced understanding of the findings.    CBC: WBC 14.2, HGB 14.3,   CMP: glucose 157, Creatinine 1.32, GFR estimate 50, calcium 8.4, bilirubin total 1.4 o/w WNL   Lipase: 51    COVID19 Virus PCR by nasopharyngeal swab pending     Interventions:  0737 NS 1000 mL IV  0737 Dilaudid 0.5 mg IV  0737 Zofran 4 mg IV  0917 Cefepime 2 g IV  0948 Flagyl 500 mg IV    Emergency Department Course:  0710 EMS arrival and report.     0714 I performed an exam of the patient as documented above. Nursing notes and vitals reviewed.    IV was inserted and blood was drawn for laboratory testing, results above.    The patient was sent for a XR Chest while in the emergency department, results above.      The patient was sent for a CT Abdomen Pelvis while in the emergency department, results above.      0910 I rechecked the patient. Explained findings to the Patient and tentative plan for surgery.     0913 I spoke with Dr. Gretchen Sommer of the General Surgery service from [facility] regarding patient's presentation, findings, and plan of care. She requested Cefixime and Flagyl.     Findings and plan explained to the Patient who consents to admission. Discussed the patient with Dr. Sommer, who will taken to the OR further monitoring, evaluation, and treatment.     Impression & Plan    Covid-19  Sharif Kumar was evaluated during a global COVID-19 pandemic, which necessitated consideration that the patient might be at risk for infection with the SARS-CoV-2 virus that causes COVID-19.   Applicable protocols for evaluation were followed during the patient's care.   COVID-19 was considered as part of  the patient's evaluation. The plan for testing is:  a test was obtained during this visit.    Medical Decision Making:  Sharif Kumar is a 84 year old male who presents with one day of right sided abdominal pain with associated nausea. He is very uncomfortable. On exam, his entire belly is tender with some guarding but right side is worse. Work up reveals an elevated white count and CT shows appendicitis with appendicoliths. Patient was started on Cefepime and Flagyl. He has been discussed with surgery and will be admitted and go to the OR.     Diagnosis:    ICD-10-CM    1. Appendicitis, unspecified appendicitis type  K37        Disposition:   The patient is under the care of Dr. Sommer and will go to surgery some time today.     Scribe Disclosure:  I, Adia Weinberg, am serving as a scribe at 7:18 AM on 7/30/2020 to document services personally performed by Franklin Yan MD based on my observations and the provider's statements to me.   [facility] EMERGENCY DEPARTMENT       Franklin Yan MD  07/30/20 2028

## 2020-07-30 NOTE — ANESTHESIA PREPROCEDURE EVALUATION
Anesthesia Pre-Procedure Evaluation    Patient: Sharif Kumar   MRN: [id number] : 1935          Preoperative Diagnosis: Appendicitis, unspecified appendicitis type [K37]    Procedure(s):  APPENDECTOMY, LAPAROSCOPIC    Past Medical History:   Diagnosis Date     Helicobacter pylori (H. pylori)      Hypertrophy (benign) of prostate      Intermittent asthma 3/20/2012     Polyp of gallbladder 3/20/2012     Priapism      History reviewed. No pertinent surgical history.    Anesthesia Evaluation     . Pt has had prior anesthetic.     No history of anesthetic complications          ROS/MED HX    ENT/Pulmonary:     (+)asthma , . .   (-) tobacco use   Neurologic:     (+)dementia (short term memory 2/2 stroke), neuropathy - RLE sciatica, CVA (~ 13 years ago) other neuro Hx of SAH    Cardiovascular:     (+) Dyslipidemia, ----. : . . . :. . Previous cardiac testing date:results:date: results:ECG reviewed date:20 results:NSR w/ LVH date: results:          METS/Exercise Tolerance:  >4 METS   Hematologic:         Musculoskeletal:   (+)  other musculoskeletal- Lumbago      GI/Hepatic:     (+) appendicitis, Other GI/Hepatic PUD      Renal/Genitourinary:     (+) chronic renal disease, type: CRI, BPH,       Endo:         Psychiatric:         Infectious Disease:         Malignancy:         Other:                          Physical Exam  Normal systems: cardiovascular, pulmonary and dental    Airway   Mallampati: II  TM distance: >3 FB  Neck ROM: full    Dental     Cardiovascular       Pulmonary             Lab Results   Component Value Date    WBC 14.2 (H) 2020    HGB 14.3 2020    HCT 42.9 2020     2020    SED 11 2015     2020    POTASSIUM 3.8 2020    CHLORIDE 105 2020    CO2 23 2020    BUN 16 2020    CR 1.32 (H) 2020     (H) 2020    MADISYN 8.4 (L) 2020    ALBUMIN 3.4 2020    PROTTOTAL 7.2 2020    ALT 25 2020    AST  "18 07/30/2020    ALKPHOS 62 07/30/2020    BILITOTAL 1.4 (H) 07/30/2020    LIPASE 51 (L) 07/30/2020    TSH 1.21 11/12/2018       Preop Vitals  BP Readings from Last 3 Encounters:   07/30/20 138/74   06/23/20 108/50   08/27/19 110/60    Pulse Readings from Last 3 Encounters:   07/30/20 82   06/23/20 63   08/27/19 63      Resp Readings from Last 3 Encounters:   07/30/20 18   06/23/20 16   08/27/19 20    SpO2 Readings from Last 3 Encounters:   07/30/20 96%   08/27/19 99%   11/12/18 98%      Temp Readings from Last 1 Encounters:   07/30/20 36.8  C (98.2  F) (Oral)    Ht Readings from Last 1 Encounters:   07/30/20 1.702 m (5' 7\")      Wt Readings from Last 1 Encounters:   07/30/20 72.6 kg (160 lb)    Estimated body mass index is 25.06 kg/m  as calculated from the following:    Height as of this encounter: 1.702 m (5' 7\").    Weight as of this encounter: 72.6 kg (160 lb).       Anesthesia Plan      History & Physical Review  History and physical reviewed and following examination; no interval change.    ASA Status:  2 .    NPO Status:  > 8 hours    Plan for General with Intravenous and Propofol induction. Maintenance will be Balanced.    PONV prophylaxis:  Ondansetron (or other 5HT-3) and Dexamethasone or Solumedrol    RSI        Postoperative Care  Postoperative pain management:  IV analgesics and Multi-modal analgesia.      Consents  Anesthetic plan, risks, benefits and alternatives discussed with:  Patient..                 Asaf Davis MD  "

## 2020-07-31 LAB
ANION GAP SERPL CALCULATED.3IONS-SCNC: 3 MMOL/L (ref 3–14)
BILIRUB SERPL-MCNC: 1.3 MG/DL (ref 0.2–1.3)
BUN SERPL-MCNC: 14 MG/DL (ref 7–30)
CALCIUM SERPL-MCNC: 8.5 MG/DL (ref 8.5–10.1)
CHLORIDE SERPL-SCNC: 109 MMOL/L (ref 94–109)
CO2 SERPL-SCNC: 26 MMOL/L (ref 20–32)
CREAT SERPL-MCNC: 1.28 MG/DL (ref 0.66–1.25)
ERYTHROCYTE [DISTWIDTH] IN BLOOD BY AUTOMATED COUNT: 14.3 % (ref 10–15)
GFR SERPL CREATININE-BSD FRML MDRD: 51 ML/MIN/{1.73_M2}
GLUCOSE BLDC GLUCOMTR-MCNC: 125 MG/DL (ref 70–99)
GLUCOSE SERPL-MCNC: 150 MG/DL (ref 70–99)
HCT VFR BLD AUTO: 41.6 % (ref 40–53)
HGB BLD-MCNC: 13.9 G/DL (ref 13.3–17.7)
MCH RBC QN AUTO: 32.1 PG (ref 26.5–33)
MCHC RBC AUTO-ENTMCNC: 33.4 G/DL (ref 31.5–36.5)
MCV RBC AUTO: 96 FL (ref 78–100)
PLATELET # BLD AUTO: 161 10E9/L (ref 150–450)
POTASSIUM SERPL-SCNC: 4.1 MMOL/L (ref 3.4–5.3)
RBC # BLD AUTO: 4.33 10E12/L (ref 4.4–5.9)
SODIUM SERPL-SCNC: 138 MMOL/L (ref 133–144)
WBC # BLD AUTO: 14.1 10E9/L (ref 4–11)

## 2020-07-31 PROCEDURE — 36415 COLL VENOUS BLD VENIPUNCTURE: CPT | Performed by: PHYSICIAN ASSISTANT

## 2020-07-31 PROCEDURE — 25000132 ZZH RX MED GY IP 250 OP 250 PS 637: Performed by: SURGERY

## 2020-07-31 PROCEDURE — 25800030 ZZH RX IP 258 OP 636: Performed by: PHYSICIAN ASSISTANT

## 2020-07-31 PROCEDURE — 25000132 ZZH RX MED GY IP 250 OP 250 PS 637

## 2020-07-31 PROCEDURE — 85027 COMPLETE CBC AUTOMATED: CPT | Performed by: PHYSICIAN ASSISTANT

## 2020-07-31 PROCEDURE — 00000146 ZZHCL STATISTIC GLUCOSE BY METER IP

## 2020-07-31 PROCEDURE — 80048 BASIC METABOLIC PNL TOTAL CA: CPT | Performed by: PHYSICIAN ASSISTANT

## 2020-07-31 PROCEDURE — 25000128 H RX IP 250 OP 636: Performed by: PHYSICIAN ASSISTANT

## 2020-07-31 PROCEDURE — 12000000 ZZH R&B MED SURG/OB

## 2020-07-31 PROCEDURE — 82247 BILIRUBIN TOTAL: CPT | Performed by: PHYSICIAN ASSISTANT

## 2020-07-31 PROCEDURE — 25000132 ZZH RX MED GY IP 250 OP 250 PS 637: Performed by: PHYSICIAN ASSISTANT

## 2020-07-31 RX ORDER — ACETAMINOPHEN 325 MG/1
650 TABLET ORAL EVERY 4 HOURS PRN
Status: DISCONTINUED | OUTPATIENT
Start: 2020-07-31 | End: 2020-08-04 | Stop reason: HOSPADM

## 2020-07-31 RX ADMIN — PIPERACILLIN SODIUM AND TAZOBACTAM SODIUM 3.38 G: 3; .375 INJECTION, POWDER, LYOPHILIZED, FOR SOLUTION INTRAVENOUS at 04:25

## 2020-07-31 RX ADMIN — FAMOTIDINE 20 MG: 20 TABLET ORAL at 21:11

## 2020-07-31 RX ADMIN — POTASSIUM CHLORIDE, DEXTROSE MONOHYDRATE AND SODIUM CHLORIDE: 150; 5; 450 INJECTION, SOLUTION INTRAVENOUS at 19:06

## 2020-07-31 RX ADMIN — PIPERACILLIN SODIUM AND TAZOBACTAM SODIUM 3.38 G: 3; .375 INJECTION, POWDER, LYOPHILIZED, FOR SOLUTION INTRAVENOUS at 16:24

## 2020-07-31 RX ADMIN — HEPARIN SODIUM 5000 UNITS: 5000 INJECTION, SOLUTION INTRAVENOUS; SUBCUTANEOUS at 09:07

## 2020-07-31 RX ADMIN — OXYCODONE HYDROCHLORIDE 5 MG: 5 TABLET ORAL at 08:04

## 2020-07-31 RX ADMIN — DOCUSATE SODIUM 50 MG AND SENNOSIDES 8.6 MG 1 TABLET: 8.6; 5 TABLET, FILM COATED ORAL at 09:07

## 2020-07-31 RX ADMIN — DOCUSATE SODIUM 50 MG AND SENNOSIDES 8.6 MG 1 TABLET: 8.6; 5 TABLET, FILM COATED ORAL at 21:11

## 2020-07-31 RX ADMIN — PIPERACILLIN SODIUM AND TAZOBACTAM SODIUM 3.38 G: 3; .375 INJECTION, POWDER, LYOPHILIZED, FOR SOLUTION INTRAVENOUS at 09:06

## 2020-07-31 RX ADMIN — HEPARIN SODIUM 5000 UNITS: 5000 INJECTION, SOLUTION INTRAVENOUS; SUBCUTANEOUS at 17:25

## 2020-07-31 RX ADMIN — POTASSIUM CHLORIDE, DEXTROSE MONOHYDRATE AND SODIUM CHLORIDE: 150; 5; 450 INJECTION, SOLUTION INTRAVENOUS at 08:02

## 2020-07-31 RX ADMIN — PIPERACILLIN SODIUM AND TAZOBACTAM SODIUM 3.38 G: 3; .375 INJECTION, POWDER, LYOPHILIZED, FOR SOLUTION INTRAVENOUS at 21:11

## 2020-07-31 RX ADMIN — OXYCODONE HYDROCHLORIDE 5 MG: 5 TABLET ORAL at 13:03

## 2020-07-31 RX ADMIN — ACETAMINOPHEN 650 MG: 325 TABLET, FILM COATED ORAL at 21:27

## 2020-07-31 ASSESSMENT — ACTIVITIES OF DAILY LIVING (ADL)
ADLS_ACUITY_SCORE: 17
ADLS_ACUITY_SCORE: 14
ADLS_ACUITY_SCORE: 12
ADLS_ACUITY_SCORE: 14
ADLS_ACUITY_SCORE: 14
ADLS_ACUITY_SCORE: 12

## 2020-07-31 NOTE — PROGRESS NOTES
RN [id number]: Patient alert/orient X4, walking hallways 3x today, up in chair most of shift. Has not passed gas, urinating fine. Tolerating clear liquids.  Oxycodone given X2 today for abdominal discomfort. Vss  Incisions C/D/I.  MAHESH drained 75cc serosang liquid.

## 2020-07-31 NOTE — PROGRESS NOTES
"SPIRITUAL HEALTH SERVICES: Tele-Encounter  Patient Location: 68  Spoke with: Charge NurseRichard    Referral Source:  Request    DATA:  Had difficulty connecting with Don this morning as my phone couldn't call directly into his room. Called charge nurse to page me into his room directly. Don said he appreciated the call but \"didn't feel he had any last sins or anything to confess over the phone.\" Patient did not seem interest in talking over the phone but understood the restrictions due to COVID. Patient reported he felt good about his condition and hoped to discharge in a couple of days. He reported he attended [employer] [facility] and his esau was important to him. Patient said he had his wife visiting in the room, so  let patient get back to conversing.     PLAN:  SH follow-up not necessary at the moment, will monitor      Yunior Khan Intern      ______________________________    Type of service:  Telephone Visit     has received verbal consent for a TelephoneVisit from the patient? Yes    Distance Provider Location: designated [place] office or home office (secure setting)    Mode of Communication: telephone (via [place] phone or [place] tele-call-number ([phone]))  "

## 2020-07-31 NOTE — PROGRESS NOTES
"General Surgery Progress Note    Subjective: pt reports pain in RLQ. No appetite.     Objective: BP (!) 152/69 (BP Location: Right arm)   Pulse 76   Temp 98.6  F (37  C) (Oral)   Resp 18   Ht 1.702 m (5' 7\")   Wt 72.6 kg (160 lb)   SpO2 97%   BMI 25.06 kg/m    Gen: alert, no distress  CV: RRR  Pulm: nonlabored breathing  Abd: tender RLQ, MAHESH drain serous.   Ext: WWP    Assessment/Plan:   Sharif Kumar  is a 84 year old male POD 1 s/p lap appendectomy for gangrenous ruptured appendicitis with pelvic abscess.   - continue IV abx  - start dvt ppx  - follow up am labs  - clear liquids until passing gas and then can advance slowly  - likely home in next 1-2 days  - continue abx for 10 day course    Mercedes Sommer MD  [facility]  [phone]              "

## 2020-08-01 LAB
ANION GAP SERPL CALCULATED.3IONS-SCNC: 3 MMOL/L (ref 3–14)
BUN SERPL-MCNC: 13 MG/DL (ref 7–30)
CALCIUM SERPL-MCNC: 8.3 MG/DL (ref 8.5–10.1)
CHLORIDE SERPL-SCNC: 111 MMOL/L (ref 94–109)
CO2 SERPL-SCNC: 25 MMOL/L (ref 20–32)
CREAT SERPL-MCNC: 1.2 MG/DL (ref 0.66–1.25)
ERYTHROCYTE [DISTWIDTH] IN BLOOD BY AUTOMATED COUNT: 14.3 % (ref 10–15)
GFR SERPL CREATININE-BSD FRML MDRD: 56 ML/MIN/{1.73_M2}
GLUCOSE BLDC GLUCOMTR-MCNC: 105 MG/DL (ref 70–99)
GLUCOSE SERPL-MCNC: 178 MG/DL (ref 70–99)
HCT VFR BLD AUTO: 37.9 % (ref 40–53)
HGB BLD-MCNC: 12.4 G/DL (ref 13.3–17.7)
MCH RBC QN AUTO: 31.6 PG (ref 26.5–33)
MCHC RBC AUTO-ENTMCNC: 32.7 G/DL (ref 31.5–36.5)
MCV RBC AUTO: 97 FL (ref 78–100)
PLATELET # BLD AUTO: 155 10E9/L (ref 150–450)
POTASSIUM SERPL-SCNC: 3.8 MMOL/L (ref 3.4–5.3)
RBC # BLD AUTO: 3.92 10E12/L (ref 4.4–5.9)
SODIUM SERPL-SCNC: 139 MMOL/L (ref 133–144)
WBC # BLD AUTO: 10.3 10E9/L (ref 4–11)

## 2020-08-01 PROCEDURE — 00000146 ZZHCL STATISTIC GLUCOSE BY METER IP

## 2020-08-01 PROCEDURE — 25000128 H RX IP 250 OP 636: Performed by: PHYSICIAN ASSISTANT

## 2020-08-01 PROCEDURE — 25000132 ZZH RX MED GY IP 250 OP 250 PS 637

## 2020-08-01 PROCEDURE — 85027 COMPLETE CBC AUTOMATED: CPT | Performed by: PHYSICIAN ASSISTANT

## 2020-08-01 PROCEDURE — 36415 COLL VENOUS BLD VENIPUNCTURE: CPT | Performed by: PHYSICIAN ASSISTANT

## 2020-08-01 PROCEDURE — 25000132 ZZH RX MED GY IP 250 OP 250 PS 637: Performed by: SURGERY

## 2020-08-01 PROCEDURE — 25000132 ZZH RX MED GY IP 250 OP 250 PS 637: Performed by: PHYSICIAN ASSISTANT

## 2020-08-01 PROCEDURE — 80048 BASIC METABOLIC PNL TOTAL CA: CPT | Performed by: PHYSICIAN ASSISTANT

## 2020-08-01 PROCEDURE — 12000000 ZZH R&B MED SURG/OB

## 2020-08-01 PROCEDURE — 25800030 ZZH RX IP 258 OP 636: Performed by: PHYSICIAN ASSISTANT

## 2020-08-01 RX ORDER — BISACODYL 10 MG
10 SUPPOSITORY, RECTAL RECTAL ONCE
Status: COMPLETED | OUTPATIENT
Start: 2020-08-01 | End: 2020-08-01

## 2020-08-01 RX ORDER — DEXTROSE MONOHYDRATE, SODIUM CHLORIDE, AND POTASSIUM CHLORIDE 50; 1.49; 4.5 G/1000ML; G/1000ML; G/1000ML
INJECTION, SOLUTION INTRAVENOUS CONTINUOUS
Status: DISCONTINUED | OUTPATIENT
Start: 2020-08-01 | End: 2020-08-02

## 2020-08-01 RX ADMIN — PIPERACILLIN SODIUM AND TAZOBACTAM SODIUM 3.38 G: 3; .375 INJECTION, POWDER, LYOPHILIZED, FOR SOLUTION INTRAVENOUS at 21:35

## 2020-08-01 RX ADMIN — PIPERACILLIN SODIUM AND TAZOBACTAM SODIUM 3.38 G: 3; .375 INJECTION, POWDER, LYOPHILIZED, FOR SOLUTION INTRAVENOUS at 08:45

## 2020-08-01 RX ADMIN — DOCUSATE SODIUM 50 MG AND SENNOSIDES 8.6 MG 1 TABLET: 8.6; 5 TABLET, FILM COATED ORAL at 21:37

## 2020-08-01 RX ADMIN — HEPARIN SODIUM 5000 UNITS: 5000 INJECTION, SOLUTION INTRAVENOUS; SUBCUTANEOUS at 01:55

## 2020-08-01 RX ADMIN — POTASSIUM CHLORIDE, DEXTROSE MONOHYDRATE AND SODIUM CHLORIDE: 150; 5; 450 INJECTION, SOLUTION INTRAVENOUS at 09:50

## 2020-08-01 RX ADMIN — PIPERACILLIN SODIUM AND TAZOBACTAM SODIUM 3.38 G: 3; .375 INJECTION, POWDER, LYOPHILIZED, FOR SOLUTION INTRAVENOUS at 15:42

## 2020-08-01 RX ADMIN — ACETAMINOPHEN 650 MG: 325 TABLET, FILM COATED ORAL at 01:55

## 2020-08-01 RX ADMIN — ACETAMINOPHEN 650 MG: 325 TABLET, FILM COATED ORAL at 12:56

## 2020-08-01 RX ADMIN — BISACODYL 10 MG: 10 SUPPOSITORY RECTAL at 09:50

## 2020-08-01 RX ADMIN — HEPARIN SODIUM 5000 UNITS: 5000 INJECTION, SOLUTION INTRAVENOUS; SUBCUTANEOUS at 08:38

## 2020-08-01 RX ADMIN — FAMOTIDINE 20 MG: 20 TABLET ORAL at 21:36

## 2020-08-01 RX ADMIN — HEPARIN SODIUM 5000 UNITS: 5000 INJECTION, SOLUTION INTRAVENOUS; SUBCUTANEOUS at 17:42

## 2020-08-01 RX ADMIN — DOCUSATE SODIUM 50 MG AND SENNOSIDES 8.6 MG 1 TABLET: 8.6; 5 TABLET, FILM COATED ORAL at 08:37

## 2020-08-01 RX ADMIN — PIPERACILLIN SODIUM AND TAZOBACTAM SODIUM 3.38 G: 3; .375 INJECTION, POWDER, LYOPHILIZED, FOR SOLUTION INTRAVENOUS at 03:43

## 2020-08-01 ASSESSMENT — ACTIVITIES OF DAILY LIVING (ADL)
ADLS_ACUITY_SCORE: 14
ADLS_ACUITY_SCORE: 12
ADLS_ACUITY_SCORE: 12
ADLS_ACUITY_SCORE: 14

## 2020-08-01 NOTE — PROGRESS NOTES
"[facility]  GENERAL SURGERY Progress Note    Admission Date: 2020         Assessment and Plan:   Sharif Kumar is a 84 year old male S/P Procedure(s): lap appy for ruptured gangrenous appendicitis, POD 2    - Clears until passing flatus, then advance to fulls  - Pain controlled with oxy  - Dulcolax supp x 1  - Continue MAHESH  - Decrease IVF  - WBC 14.1-->10.3, on Zosyn, will transition to Augmentin at discharge  - BUN 1.28-->1.2, improved with fluids  - Continue Pepcid and heparin for prophylaxis  - Pathology pending  - Ambulate 4x day and encourage IS  - Home for home in 1-2 days             Interval History:   Pain controlled, tolerating diet, -Flatus/BM, UO adequate, ambulating.  Meds reviewed.                      Physical Exam:   Blood pressure 114/65, pulse 71, temperature 97.8  F (36.6  C), temperature source Oral, resp. rate 18, height 1.702 m (5' 7\"), weight 72.6 kg (160 lb), SpO2 94 %.  Temperature Temp  Av.8  F (36.6  C)  Min: 97.4  F (36.3  C)  Max: 98.5  F (36.9  C)   I/O last 3 completed shifts:  In: 1396 [P.O.:640; I.V.:756]  Out: 975 [Urine:850; Drains:125]  Constitutional:  Awake, alert, oriented, and in no apparent distress.   Lungs: No increased work of breathing, good air exchange, clear to auscultation bilaterally, and no crackles or wheezing.   Cardiovascular: Regular rate and rhythm, normal S1 and S2, and no murmur noted.   Abdomen: Soft, non-distended, appropriately tender at incision(s), + BS. MAHESH drain intact, serosanguinous.   Wound(s): Clean, dry, and intact. No erythema or drainage.    Extremities: No edema or calf tenderness. +SCDs          Data:     Recent Labs   Lab Test 20  0904 20  0852 20  0733   WBC 10.3 14.1* 14.2*   HGB 12.4* 13.9 14.3   HCT 37.9* 41.6 42.9    161 187      Recent Labs   Lab Test 20  0904 20  0852 20  0733    138 136   POTASSIUM 3.8 4.1 3.8   CHLORIDE 111* 109 105   CO2 25 26 23 "   BUN 13 14 16   CR 1.20 1.28* 1.32*       Meghan Tavarez PA-C  [facility]  [phone]

## 2020-08-01 NOTE — PROGRESS NOTES
L wrist IV infilatrated. Dextrose 5% and 0.45% NaCl + KCl 20 meq/L infusing at 100 ml/hr. Infusion stopped. Attempted to withdraw with a syringe, only withdrew 1 mL of fluid. Charge RN called pharmacy to report. Outlined the site and applied ice. Nurse Manager came to floor to photograph and measure with tape.

## 2020-08-01 NOTE — PROGRESS NOTES
RN [id number]: Patient alert/orient X4, up with minimal sba/belt. Tolerating clear liquids, Bowel sounds hypo/no passing gas yet, gave senna & suppository this am. Tylenol given X1 for abdominal discomfort/does not like oxycodone.  Ambulated X3 today, up in chair.  MAHESH with 50cc sarosang drainage.

## 2020-08-02 LAB — PLATELET # BLD AUTO: 210 10E9/L (ref 150–450)

## 2020-08-02 PROCEDURE — 85049 AUTOMATED PLATELET COUNT: CPT | Performed by: PHYSICIAN ASSISTANT

## 2020-08-02 PROCEDURE — 25000128 H RX IP 250 OP 636: Performed by: PHYSICIAN ASSISTANT

## 2020-08-02 PROCEDURE — 25000132 ZZH RX MED GY IP 250 OP 250 PS 637

## 2020-08-02 PROCEDURE — 25000132 ZZH RX MED GY IP 250 OP 250 PS 637: Performed by: PHYSICIAN ASSISTANT

## 2020-08-02 PROCEDURE — 36415 COLL VENOUS BLD VENIPUNCTURE: CPT | Performed by: PHYSICIAN ASSISTANT

## 2020-08-02 PROCEDURE — 12000000 ZZH R&B MED SURG/OB

## 2020-08-02 RX ORDER — BISACODYL 10 MG
10 SUPPOSITORY, RECTAL RECTAL ONCE
Status: COMPLETED | OUTPATIENT
Start: 2020-08-02 | End: 2020-08-02

## 2020-08-02 RX ORDER — OXYCODONE HYDROCHLORIDE 5 MG/1
5 TABLET ORAL EVERY 4 HOURS PRN
Qty: 10 TABLET | Refills: 0 | Status: SHIPPED | OUTPATIENT
Start: 2020-08-02 | End: 2020-08-11

## 2020-08-02 RX ORDER — AMOXICILLIN 250 MG
1 CAPSULE ORAL 2 TIMES DAILY
Qty: 10 TABLET | Refills: 0 | Status: SHIPPED | OUTPATIENT
Start: 2020-08-02 | End: 2021-01-20

## 2020-08-02 RX ADMIN — DOCUSATE SODIUM 50 MG AND SENNOSIDES 8.6 MG 1 TABLET: 8.6; 5 TABLET, FILM COATED ORAL at 08:57

## 2020-08-02 RX ADMIN — HEPARIN SODIUM 5000 UNITS: 5000 INJECTION, SOLUTION INTRAVENOUS; SUBCUTANEOUS at 11:10

## 2020-08-02 RX ADMIN — PIPERACILLIN SODIUM AND TAZOBACTAM SODIUM 3.38 G: 3; .375 INJECTION, POWDER, LYOPHILIZED, FOR SOLUTION INTRAVENOUS at 16:45

## 2020-08-02 RX ADMIN — HEPARIN SODIUM 5000 UNITS: 5000 INJECTION, SOLUTION INTRAVENOUS; SUBCUTANEOUS at 02:58

## 2020-08-02 RX ADMIN — BISACODYL 10 MG: 10 SUPPOSITORY RECTAL at 09:59

## 2020-08-02 RX ADMIN — FAMOTIDINE 20 MG: 20 TABLET ORAL at 21:29

## 2020-08-02 RX ADMIN — PIPERACILLIN SODIUM AND TAZOBACTAM SODIUM 3.38 G: 3; .375 INJECTION, POWDER, LYOPHILIZED, FOR SOLUTION INTRAVENOUS at 02:57

## 2020-08-02 RX ADMIN — HEPARIN SODIUM 5000 UNITS: 5000 INJECTION, SOLUTION INTRAVENOUS; SUBCUTANEOUS at 19:20

## 2020-08-02 RX ADMIN — DOCUSATE SODIUM 50 MG AND SENNOSIDES 8.6 MG 1 TABLET: 8.6; 5 TABLET, FILM COATED ORAL at 21:29

## 2020-08-02 RX ADMIN — PIPERACILLIN SODIUM AND TAZOBACTAM SODIUM 3.38 G: 3; .375 INJECTION, POWDER, LYOPHILIZED, FOR SOLUTION INTRAVENOUS at 21:28

## 2020-08-02 RX ADMIN — PIPERACILLIN SODIUM AND TAZOBACTAM SODIUM 3.38 G: 3; .375 INJECTION, POWDER, LYOPHILIZED, FOR SOLUTION INTRAVENOUS at 08:57

## 2020-08-02 ASSESSMENT — ACTIVITIES OF DAILY LIVING (ADL)
ADLS_ACUITY_SCORE: 12

## 2020-08-02 NOTE — PROGRESS NOTES
"[facility]  GENERAL SURGERY Progress Note    Admission Date: 2020         Assessment and Plan:   Sharif Kumar is a 84 year old male S/P Procedure(s): lap appy for ruptured gangrenous appendicitis, POD 3     - Fulls, low residue tomorrow  - Pain controlled with Tylenol  - Dulcolax supp x 1  - Continue MAHESH  - Lock IVF  - WBC 10.3 yesterday, on Zosyn, will transition to Augmentin at discharge  - Continue Pepcid and heparin for prophylaxis  - Pathology pending  - Ambulate 4x day and encourage IS  - Hope for home tomorrow             Interval History:   Feels like he had a rough night, feeling gurgling in abdomen, feeling much better this morning, pain controlled, tolerating clears, +Flatus/-BM, UO adequate, ambulating.  Meds reviewed.                      Physical Exam:   Blood pressure 122/51, pulse 72, temperature 98.6  F (37  C), temperature source Oral, resp. rate 16, height 1.702 m (5' 7\"), weight 72.6 kg (160 lb), SpO2 95 %.  Temperature Temp  Av.6  F (37  C)  Min: 98.6  F (37  C)  Max: 98.6  F (37  C)   I/O last 3 completed shifts:  In: 1835 [P.O.:800; I.V.:1035]  Out: 1365 [Urine:1275; Drains:90]  Constitutional:  Awake, alert, oriented, and in no apparent distress.   Lungs: No increased work of breathing, good air exchange, clear to auscultation bilaterally, and no crackles or wheezing.   Cardiovascular: Regular rate and rhythm, normal S1 and S2, and no murmur noted.   Abdomen: Soft, non-distended, appropriately tender at incision(s), + BS. MAHESH drain intact, serosanguinous.   Wound(s): Clean, dry, and intact. No erythema or drainage.    Extremities: No edema or calf tenderness. +SCDs          Data:   No new labs/imaging.     Meghan Tavarez PA-C  [facility]  [phone]  "

## 2020-08-03 LAB — COPATH REPORT: NORMAL

## 2020-08-03 PROCEDURE — 25000132 ZZH RX MED GY IP 250 OP 250 PS 637

## 2020-08-03 PROCEDURE — 40000141 ZZH STATISTIC PERIPHERAL IV START W/O US GUIDANCE

## 2020-08-03 PROCEDURE — 25000132 ZZH RX MED GY IP 250 OP 250 PS 637: Performed by: PHYSICIAN ASSISTANT

## 2020-08-03 PROCEDURE — 12000000 ZZH R&B MED SURG/OB

## 2020-08-03 PROCEDURE — 25000128 H RX IP 250 OP 636: Performed by: PHYSICIAN ASSISTANT

## 2020-08-03 PROCEDURE — 25000132 ZZH RX MED GY IP 250 OP 250 PS 637: Performed by: SURGERY

## 2020-08-03 RX ORDER — POLYETHYLENE GLYCOL 3350 17 G/17G
17 POWDER, FOR SOLUTION ORAL 2 TIMES DAILY
Status: DISCONTINUED | OUTPATIENT
Start: 2020-08-03 | End: 2020-08-04 | Stop reason: HOSPADM

## 2020-08-03 RX ORDER — POLYETHYLENE GLYCOL 3350 17 G/17G
17 POWDER, FOR SOLUTION ORAL DAILY
Status: DISCONTINUED | OUTPATIENT
Start: 2020-08-03 | End: 2020-08-03

## 2020-08-03 RX ADMIN — PIPERACILLIN SODIUM AND TAZOBACTAM SODIUM 3.38 G: 3; .375 INJECTION, POWDER, LYOPHILIZED, FOR SOLUTION INTRAVENOUS at 20:59

## 2020-08-03 RX ADMIN — HEPARIN SODIUM 5000 UNITS: 5000 INJECTION, SOLUTION INTRAVENOUS; SUBCUTANEOUS at 11:06

## 2020-08-03 RX ADMIN — DOCUSATE SODIUM 50 MG AND SENNOSIDES 8.6 MG 1 TABLET: 8.6; 5 TABLET, FILM COATED ORAL at 08:01

## 2020-08-03 RX ADMIN — PIPERACILLIN SODIUM AND TAZOBACTAM SODIUM 3.38 G: 3; .375 INJECTION, POWDER, LYOPHILIZED, FOR SOLUTION INTRAVENOUS at 03:34

## 2020-08-03 RX ADMIN — HEPARIN SODIUM 5000 UNITS: 5000 INJECTION, SOLUTION INTRAVENOUS; SUBCUTANEOUS at 19:15

## 2020-08-03 RX ADMIN — DOCUSATE SODIUM 286 ML: 50 LIQUID ORAL at 09:49

## 2020-08-03 RX ADMIN — PIPERACILLIN SODIUM AND TAZOBACTAM SODIUM 3.38 G: 3; .375 INJECTION, POWDER, LYOPHILIZED, FOR SOLUTION INTRAVENOUS at 09:49

## 2020-08-03 RX ADMIN — POLYETHYLENE GLYCOL 3350 17 G: 17 POWDER, FOR SOLUTION ORAL at 20:59

## 2020-08-03 RX ADMIN — POLYETHYLENE GLYCOL 3350 17 G: 17 POWDER, FOR SOLUTION ORAL at 09:50

## 2020-08-03 RX ADMIN — HEPARIN SODIUM 5000 UNITS: 5000 INJECTION, SOLUTION INTRAVENOUS; SUBCUTANEOUS at 03:34

## 2020-08-03 RX ADMIN — PIPERACILLIN SODIUM AND TAZOBACTAM SODIUM 3.38 G: 3; .375 INJECTION, POWDER, LYOPHILIZED, FOR SOLUTION INTRAVENOUS at 16:57

## 2020-08-03 RX ADMIN — FAMOTIDINE 20 MG: 20 TABLET ORAL at 21:00

## 2020-08-03 ASSESSMENT — ACTIVITIES OF DAILY LIVING (ADL)
ADLS_ACUITY_SCORE: 12

## 2020-08-03 NOTE — PROGRESS NOTES
"[facility]  GENERAL SURGERY Progress Note    Admission Date: 2020  8/3/2020         Assessment and Plan:   Sharif Kumar is a 84 year old male S/P Procedure(s):  lap appy for ruptured gangrenous appendicitis, POD 4     - Low residue diet  - Pain controlled with Tylenol  - Pink lady enema  - Remove MAHESH  - WBC 10.3 yesterday, on Zosyn, will transition to Augmentin at discharge  - Continue Pepcid and heparin for prophylaxis  - Ambulate 4x day and encourage IS  - Home later today if AFVSS, +BM, drain out             Interval History:   Pain controlled, tolerating diet, +Flatus/-BM, UO adequate, ambulating.  Meds reviewed.                      Physical Exam:   Blood pressure 110/55, pulse 72, temperature 98.7  F (37.1  C), temperature source Oral, resp. rate 16, height 1.702 m (5' 7\"), weight 72.6 kg (160 lb), SpO2 95 %.  Temperature Temp  Av.6  F (37  C)  Min: 98.4  F (36.9  C)  Max: 98.7  F (37.1  C)   I/O last 3 completed shifts:  In: -   Out: 360 [Urine:200; Drains:160]  Constitutional:  Awake, alert, oriented, and in no apparent distress.   Lungs: No increased work of breathing, good air exchange.   Cardiovascular: Regular rate and rhythm.   Abdomen: Soft, non-distended, appropriately tender at incision(s). MAHESH drain intact, serosanguinous.   Wound(s): Clean, dry, and intact. No erythema or drainage.    Extremities: No edema or calf tenderness. +SCDs          Data:   No new labs/imaging.     Meghan Tavarez PA-C  [facility]  [phone]  "

## 2020-08-04 VITALS
DIASTOLIC BLOOD PRESSURE: 64 MMHG | TEMPERATURE: 98.6 F | RESPIRATION RATE: 16 BRPM | OXYGEN SATURATION: 94 % | WEIGHT: 159.9 LBS | BODY MASS INDEX: 25.1 KG/M2 | HEIGHT: 67 IN | SYSTOLIC BLOOD PRESSURE: 129 MMHG | HEART RATE: 72 BPM

## 2020-08-04 LAB
ANION GAP SERPL CALCULATED.3IONS-SCNC: 4 MMOL/L (ref 3–14)
BASOPHILS # BLD AUTO: 0 10E9/L (ref 0–0.2)
BASOPHILS NFR BLD AUTO: 0.4 %
BUN SERPL-MCNC: 11 MG/DL (ref 7–30)
CALCIUM SERPL-MCNC: 8.3 MG/DL (ref 8.5–10.1)
CHLORIDE SERPL-SCNC: 109 MMOL/L (ref 94–109)
CO2 SERPL-SCNC: 26 MMOL/L (ref 20–32)
CREAT SERPL-MCNC: 1.28 MG/DL (ref 0.66–1.25)
DIFFERENTIAL METHOD BLD: ABNORMAL
EOSINOPHIL # BLD AUTO: 0.4 10E9/L (ref 0–0.7)
EOSINOPHIL NFR BLD AUTO: 6.7 %
ERYTHROCYTE [DISTWIDTH] IN BLOOD BY AUTOMATED COUNT: 13.6 % (ref 10–15)
GFR SERPL CREATININE-BSD FRML MDRD: 51 ML/MIN/{1.73_M2}
GLUCOSE SERPL-MCNC: 92 MG/DL (ref 70–99)
HCT VFR BLD AUTO: 36.6 % (ref 40–53)
HGB BLD-MCNC: 12.2 G/DL (ref 13.3–17.7)
IMM GRANULOCYTES # BLD: 0.1 10E9/L (ref 0–0.4)
IMM GRANULOCYTES NFR BLD: 0.9 %
LYMPHOCYTES # BLD AUTO: 1.2 10E9/L (ref 0.8–5.3)
LYMPHOCYTES NFR BLD AUTO: 21.4 %
MCH RBC QN AUTO: 31.5 PG (ref 26.5–33)
MCHC RBC AUTO-ENTMCNC: 33.3 G/DL (ref 31.5–36.5)
MCV RBC AUTO: 95 FL (ref 78–100)
MONOCYTES # BLD AUTO: 0.6 10E9/L (ref 0–1.3)
MONOCYTES NFR BLD AUTO: 11.5 %
NEUTROPHILS # BLD AUTO: 3.3 10E9/L (ref 1.6–8.3)
NEUTROPHILS NFR BLD AUTO: 59.1 %
NRBC # BLD AUTO: 0 10*3/UL
NRBC BLD AUTO-RTO: 0 /100
PLATELET # BLD AUTO: 230 10E9/L (ref 150–450)
POTASSIUM SERPL-SCNC: 3.6 MMOL/L (ref 3.4–5.3)
RBC # BLD AUTO: 3.87 10E12/L (ref 4.4–5.9)
SODIUM SERPL-SCNC: 139 MMOL/L (ref 133–144)
WBC # BLD AUTO: 5.6 10E9/L (ref 4–11)

## 2020-08-04 PROCEDURE — 36415 COLL VENOUS BLD VENIPUNCTURE: CPT | Performed by: SURGERY

## 2020-08-04 PROCEDURE — 25000128 H RX IP 250 OP 636: Performed by: PHYSICIAN ASSISTANT

## 2020-08-04 PROCEDURE — 80048 BASIC METABOLIC PNL TOTAL CA: CPT | Performed by: SURGERY

## 2020-08-04 PROCEDURE — 25000132 ZZH RX MED GY IP 250 OP 250 PS 637: Performed by: SURGERY

## 2020-08-04 PROCEDURE — 85025 COMPLETE CBC W/AUTO DIFF WBC: CPT | Performed by: SURGERY

## 2020-08-04 RX ORDER — SIMETHICONE 40MG/0.6ML
40 SUSPENSION, DROPS(FINAL DOSAGE FORM)(ML) ORAL EVERY 6 HOURS PRN
Status: DISCONTINUED | OUTPATIENT
Start: 2020-08-04 | End: 2020-08-04 | Stop reason: HOSPADM

## 2020-08-04 RX ADMIN — POLYETHYLENE GLYCOL 3350 17 G: 17 POWDER, FOR SOLUTION ORAL at 10:09

## 2020-08-04 RX ADMIN — PIPERACILLIN SODIUM AND TAZOBACTAM SODIUM 3.38 G: 3; .375 INJECTION, POWDER, LYOPHILIZED, FOR SOLUTION INTRAVENOUS at 10:06

## 2020-08-04 RX ADMIN — PIPERACILLIN SODIUM AND TAZOBACTAM SODIUM 3.38 G: 3; .375 INJECTION, POWDER, LYOPHILIZED, FOR SOLUTION INTRAVENOUS at 03:48

## 2020-08-04 RX ADMIN — HEPARIN SODIUM 5000 UNITS: 5000 INJECTION, SOLUTION INTRAVENOUS; SUBCUTANEOUS at 02:35

## 2020-08-04 ASSESSMENT — MIFFLIN-ST. JEOR: SCORE: 1383.93

## 2020-08-04 ASSESSMENT — ACTIVITIES OF DAILY LIVING (ADL)
ADLS_ACUITY_SCORE: 12

## 2020-08-04 NOTE — PROGRESS NOTES
Pt is discharging home today.  A follow up appointment has been scheduled for pt in clinic on 8/11/20, as he will be needing labs done.  No other discharge needs have been identified.

## 2020-08-04 NOTE — PROGRESS NOTES
"General Surgery Progress Note    Subjective: Richard reports some right mid abdominal pain this morning. Had small bm with enema yesterday. Feels he needs to have normal bm before discharge.     Objective: /63 (BP Location: Right arm)   Pulse 72   Temp 98.6  F (37  C) (Oral)   Resp 16   Ht 1.702 m (5' 7\")   Wt 72.5 kg (159 lb 14.4 oz)   SpO2 95%   BMI 25.04 kg/m    Gen: alert, no distress  CV: RRR  Pulm: nonlabored breathing  Abd: soft, incisions c/d/i, mild ecchymosis surrounding umbilical incision  Ext: WWP    Assessment/Plan:   Sharif Kumar  is a 84 year old male s/p lap appendectomy for gangrenous ruptured appendicitis. Discussed that it is unlikely that he will have normal bowel movements for some time due to the degree of infection at time of surgery. Bowels will take awhile to return to normal. Expect some abdominal pain as well. We will check labs today and try additional enema per his request. I do think he is fine to discharge today.   - labs  - soap suds enema    Mercedes Sommer MD  [facility]  [phone]              "

## 2020-08-05 ENCOUNTER — TELEPHONE (OUTPATIENT)
Dept: FAMILY MEDICINE | Facility: CLINIC | Age: 85
End: 2020-08-05

## 2020-08-05 NOTE — TELEPHONE ENCOUNTER
"Hospital/TCU/ED for chronic condition Discharge Protocol    \"Hi, my name is Lynne Hong RN, a registered nurse, and I am calling from [facility].  I am calling to follow up and see how things are going for you after your recent emergency visit/hospital/TCU stay.\"    Tell me how you are doing now that you are home?\"     Pt thought he had been doing well last night. He is feeling a bit tired but otherwise OK.       Discharge Instructions    \"Let's review your discharge instructions.  What is/are the follow-up recommendations?  Pt. Response: Follow up with primary    \"Has an appointment with your primary care provider been scheduled?\"   Yes. (confirm)    \"When you see the provider, I would recommend that you bring your medications with you.\"    Medications    \"Tell me what changed about your medicines when you discharged?\"    Changes to chronic meds?    0-1    \"What questions do you have about your medications?\"    None     New diagnoses of heart failure, COPD, diabetes, or MI?    No              Post Discharge Medication Reconciliation Status: discharge medications reconciled, continue medications without change.    Was MTM referral placed (*Make sure to put transitions as reason for referral)?   No    Call Summary    \"What questions or concerns do you have about your recent visit and your follow-up care?\"     none    \"If you have questions or things don't continue to improve, we encourage you contact us through the main clinic number (give number).  Even if the clinic is not open, triage nurses are available 24/7 to help you.     We would like you to know that our clinic has extended hours (provide information).  We also have urgent care (provide details on closest location and hours/contact info)\"      \"Thank you for your time and take care!\"             "

## 2020-08-05 NOTE — DISCHARGE SUMMARY
"Discharge Summary    Sharif Kumar MRN# [id number]   YOB: 1935 Age: 84 year old     Date of Admission:  7/30/2020  Date of Discharge:  8/4/2020  3:01 PM  Admitting Physician:  Mercedes Sommer MD  Discharging Service:  Surgery  Primary Provider: Jose Maria Valera         Admission/Discharge Diagnosis:   Principle Diagnosis: Acute gangrenous appendicitis with perforation and pelvic abscess    Secondary diagnosis: None         Procedures:   Procedure(s):  APPENDECTOMY, LAPAROSCOPIC         Brief History of Illness:   This patient was a 84 year old male who presented with RLQ abdominal pain x 1 day.  He was found to have leukocytosis and his imaging was consistent with appendicitis.  After discussing the risks, benefits, and possible complications, an informed consent was obtained and the patient underwent the above procedure. Please see the Operative Report for full details.         Hospital Course:   The patient had an expected post op ileus which resolved slowly.  His MAHESH was removed POD 4.  He remained on Zosyn and was transitioned to Augmentin at discharge.  The patient's pain was controlled and he was tolerating food day of discharge.  The patient was discharged to home in stable condition by the surgical service.  He verbalized understanding of all discharge instructions.  He was asked to call with any further questions or concerns.  Please see chart for details.          Consultations:     No consultations were requested during this admission          Discharge Disposition:     Discharged to home          Condition on Discharge:     Discharge condition: Stable   Discharge vitals: Blood pressure 129/64, pulse 72, temperature 98.6  F (37  C), temperature source Oral, resp. rate 16, height 1.702 m (5' 7\"), weight 72.5 kg (159 lb 14.4 oz), SpO2 94 %.            Discharge Medications:     Discharge Medication List as of 8/4/2020  2:06 PM      START taking these medications    Details "   amoxicillin-clavulanate (AUGMENTIN) 875-125 MG tablet Take 1 tablet by mouth 2 times daily, Disp-12 tablet,R-0, E-Prescribe      oxyCODONE (ROXICODONE) 5 MG tablet Take 1 tablet (5 mg) by mouth every 4 hours as needed for moderate to severe pain, Disp-10 tablet,R-0, E-Prescribe      senna-docusate (SENOKOT-S/PERICOLACE) 8.6-50 MG tablet Take 1 tablet by mouth 2 times daily, Disp-10 tablet,R-0, E-Prescribe         STOP taking these medications       acetaminophen (TYLENOL) 325 MG tablet Comments:   Reason for Stopping:                  Discharge Instructions:   See discharge instruction sheet.  Follow up with Dr Valera as scheduled for you on Tuesday 8/11/20 at 10:20AM for hospital follow up and check of your creatinine (kidney function). Avoid NSAIDs, such as ibuprofen, aspirin or naprosyn. You may take Tylenol.           After Care Instructions     Activity      Please see attached discharge instructions.         Diet      Stay on a low fiber diet for 1-2 weeks. Avoid heavy, spicy, greasy meals and gas forming foods, such as cabbage, broccoli, and onions.    You may find your appetite to be diminished briefly after surgery.  You may take nutritional supplement shakes if you are able.   Drink plenty of fluids to stay hydrated.             Meghan Tavarez PA-C, dictating on behalf of Mercedes Sommer MD  Office #: [phone]

## 2020-08-05 NOTE — TELEPHONE ENCOUNTER
Removal of your appendix    START taking:  amoxicillin-clavulanate (AUGMENTIN)  oxyCODONE (ROXICODONE)  senna-docusate (SENOKOT-S/PERICOLACE)    STOP taking:  acetaminophen 325 MG tablet (TYLENOL)    Stay on a low fiber diet for 1-2 weeks. Avoid heavy, spicy, greasy meals and gas forming foods, such as cabbage, broccoli, and onions. You may take nutritional supplement shakes if you are able. Drink plenty of fluids to stay hydrated.    Avoid NSAIDs, such as ibuprofen, aspirin or naprosyn. You may takeTylenol.

## 2020-08-11 ENCOUNTER — OFFICE VISIT (OUTPATIENT)
Dept: FAMILY MEDICINE | Facility: CLINIC | Age: 85
End: 2020-08-11
Payer: COMMERCIAL

## 2020-08-11 VITALS
SYSTOLIC BLOOD PRESSURE: 114 MMHG | BODY MASS INDEX: 24.35 KG/M2 | OXYGEN SATURATION: 99 % | TEMPERATURE: 97.1 F | WEIGHT: 155.5 LBS | HEART RATE: 67 BPM | DIASTOLIC BLOOD PRESSURE: 60 MMHG

## 2020-08-11 DIAGNOSIS — J32.9 CHRONIC SINUSITIS, UNSPECIFIED LOCATION: ICD-10-CM

## 2020-08-11 DIAGNOSIS — R39.11 BENIGN PROSTATIC HYPERPLASIA WITH URINARY HESITANCY: ICD-10-CM

## 2020-08-11 DIAGNOSIS — N40.1 BENIGN PROSTATIC HYPERPLASIA WITH URINARY HESITANCY: ICD-10-CM

## 2020-08-11 DIAGNOSIS — Z09 FOLLOW-UP EXAMINATION FOLLOWING SURGERY: ICD-10-CM

## 2020-08-11 DIAGNOSIS — K35.32 PERFORATED APPENDIX: Primary | ICD-10-CM

## 2020-08-11 PROCEDURE — 99495 TRANSJ CARE MGMT MOD F2F 14D: CPT | Performed by: INTERNAL MEDICINE

## 2020-08-11 NOTE — PROGRESS NOTES
Subjective     Sharif Kumar is a 84 year old male who presents to clinic today for the following health issues:    [facility]         Hospital Follow-up Visit:    Hospital/Nursing Home/IP Rehab Facility: [facility]  Date of Admission: 7-30-20  Date of Discharge: 8-4-20  Reason(s) for Admission: abdominal pain, unable to use the bathroom      Was your hospitalization related to COVID-19? No   Problems taking medications regularly:  None  Medication changes since discharge: None  Problems adhering to non-medication therapy:  None    Summary of hospitalization:  [facility] discharge summary reviewed  Diagnostic Tests/Treatments reviewed.  Follow up needed: yes  Other Healthcare Providers Involved in Patient s Care:         Specialist appointment - surgery  Update since discharge: improved.       Post Discharge Medication Reconciliation: discharge medications reconciled and changed, per note/orders.  Plan of care communicated with patient              Finished augmentin last evening.             Appetite is good; no diarrhea; no abd pain.                         Of note, his chronic sinus sx are much better since the course of augmentin.   Urination occas slow.     Current Outpatient Medications   Medication Sig Dispense Refill     amoxicillin-clavulanate (AUGMENTIN) 875-125 MG tablet Take 1 tablet by mouth 2 times daily 12 tablet 0     senna-docusate (SENOKOT-S/PERICOLACE) 8.6-50 MG tablet Take 1 tablet by mouth 2 times daily 10 tablet 0     Allergies   Allergen Reactions     No Known Drug Allergies      BP Readings from Last 3 Encounters:   08/11/20 114/60   08/04/20 129/64   06/23/20 108/50    Wt Readings from Last 3 Encounters:   08/11/20 70.5 kg (155 lb 8 oz)   08/04/20 72.5 kg (159 lb 14.4 oz)   06/23/20 74.4 kg (164 lb)                    Reviewed and updated as needed this visit by Provider         Review of Systems   CONSTITUTIONAL:NEGATIVE for fever, chills, change in weight  RESP:NEGATIVE  "for significant cough or SOB  CV: NEGATIVE for chest pain, palpitations or peripheral edema      Objective    /60 (Cuff Size: Adult Regular)   Pulse 67   Temp 97.1  F (36.2  C) (Temporal)   Wt 70.5 kg (155 lb 8 oz)   SpO2 99%   BMI 24.35 kg/m    Body mass index is 24.35 kg/m .  Physical Exam   GENERAL APPEARANCE: healthy, alert and no distress  CV: regular rates and rhythm, normal S1 S2, no S3 or S4 and no murmur, click or rub  ABDOMEN: surg scars are healed.             Abdomen is soft, with slight RUQ tenderness.          Diagnostic Test Results:  Labs reviewed in Epic        Assessment & Plan     Sharif was seen today for hospital f/u.    Diagnoses and all orders for this visit:    Perforated appendix    Follow-up examination following surgery    Chronic sinusitis, unspecified location    Benign prostatic hyperplasia with urinary hesitancy         BMI:   Estimated body mass index is 24.35 kg/m  as calculated from the following:    Height as of 7/30/20: 1.702 m (5' 7\").    Weight as of this encounter: 70.5 kg (155 lb 8 oz).           So far he is doing well post-op.        We discussed that there is a chance that he could have a recurrent intra-abdominal infection, since his appendix had perforated.    watch for fever, chills, abd pain,etc.                  Return if symptoms worsen or fail to improve.    Jose Maria Valera MD  [facility]  "

## 2020-08-12 ASSESSMENT — ASTHMA QUESTIONNAIRES: ACT_TOTALSCORE: 23

## 2020-08-17 ENCOUNTER — TELEPHONE (OUTPATIENT)
Dept: SURGERY | Facility: CLINIC | Age: 85
End: 2020-08-17

## 2020-08-17 NOTE — TELEPHONE ENCOUNTER
[facility]  Post op call note     Sharif Kumar was called for an update regarding his recovery.  He underwent a laparoscopic appendectomy by Dr. Sommer on 7/30/20.  He states he has been having loose BM once a day.  He has completed his course of Augmentin.  He is eating a normal diet.  He denies abdominal pain, nausea or vomiting.  He states his wounds are healing well. The pathology revealed acute appendicitis with perforation.  This was discussed with the patient. I recommend he try Metamucil.  He was instructed to call the office if he continues to have loose BM or if the frequency increases.  He states he understands our discussion.  He was instructed to continue to keep his wounds clean and dry and gradually resume all normal activities. The patient states all of his questions were answered and he agrees to follow up in clinic as needed.      Meghan Tavarez PA-C

## 2020-09-21 ENCOUNTER — APPOINTMENT (OUTPATIENT)
Dept: URBAN - METROPOLITAN AREA CLINIC 255 | Age: 85
Setting detail: DERMATOLOGY
End: 2020-09-22

## 2020-09-21 DIAGNOSIS — L82.0 INFLAMED SEBORRHEIC KERATOSIS: ICD-10-CM

## 2020-09-21 DIAGNOSIS — L57.0 ACTINIC KERATOSIS: ICD-10-CM

## 2020-09-21 DIAGNOSIS — L82.1 OTHER SEBORRHEIC KERATOSIS: ICD-10-CM

## 2020-09-21 DIAGNOSIS — Z85.828 PERSONAL HISTORY OF OTHER MALIGNANT NEOPLASM OF SKIN: ICD-10-CM

## 2020-09-21 PROCEDURE — OTHER MIPS QUALITY: OTHER

## 2020-09-21 PROCEDURE — OTHER LIQUID NITROGEN: OTHER

## 2020-09-21 PROCEDURE — 17004 DESTROY PREMAL LESIONS 15/>: CPT

## 2020-09-21 PROCEDURE — OTHER COUNSELING: OTHER

## 2020-09-21 PROCEDURE — 17110 DESTRUCT B9 LESION 1-14: CPT | Mod: 59

## 2020-09-21 PROCEDURE — 99213 OFFICE O/P EST LOW 20 MIN: CPT | Mod: 25

## 2020-09-21 ASSESSMENT — LOCATION SIMPLE DESCRIPTION DERM
LOCATION SIMPLE: LEFT CHEEK
LOCATION SIMPLE: LEFT FOREARM
LOCATION SIMPLE: LEFT NOSE
LOCATION SIMPLE: RIGHT FOREHEAD
LOCATION SIMPLE: LEFT ZYGOMA
LOCATION SIMPLE: RIGHT FOREARM
LOCATION SIMPLE: RIGHT CHEEK
LOCATION SIMPLE: LEFT FOREHEAD
LOCATION SIMPLE: RIGHT TEMPLE
LOCATION SIMPLE: POSTERIOR SCALP

## 2020-09-21 ASSESSMENT — LOCATION ZONE DERM
LOCATION ZONE: NOSE
LOCATION ZONE: FACE
LOCATION ZONE: SCALP
LOCATION ZONE: ARM

## 2020-09-21 ASSESSMENT — LOCATION DETAILED DESCRIPTION DERM
LOCATION DETAILED: LEFT SUPERIOR PREAURICULAR CHEEK
LOCATION DETAILED: RIGHT VENTRAL PROXIMAL FOREARM
LOCATION DETAILED: RIGHT CENTRAL MALAR CHEEK
LOCATION DETAILED: RIGHT MEDIAL TEMPLE
LOCATION DETAILED: RIGHT CENTRAL TEMPLE
LOCATION DETAILED: LEFT NASAL SIDEWALL
LOCATION DETAILED: LEFT FOREHEAD
LOCATION DETAILED: RIGHT INFERIOR FOREHEAD
LOCATION DETAILED: LEFT INFERIOR NASAL CHEEK
LOCATION DETAILED: LEFT LATERAL ZYGOMA
LOCATION DETAILED: LEFT VENTRAL PROXIMAL FOREARM
LOCATION DETAILED: RIGHT SUPERIOR LATERAL MALAR CHEEK
LOCATION DETAILED: RIGHT FOREHEAD
LOCATION DETAILED: LEFT POSTERIOR PARIETAL SCALP
LOCATION DETAILED: LEFT SUPERIOR OCCIPITAL SCALP
LOCATION DETAILED: LEFT MEDIAL ZYGOMA
LOCATION DETAILED: LEFT CENTRAL MALAR CHEEK

## 2020-09-21 NOTE — PROCEDURE: LIQUID NITROGEN
Render Post-Care Instructions In Note?: no
Number Of Freeze-Thaw Cycles: 1 freeze-thaw cycle
Detail Level: Detailed
Duration Of Freeze Thaw-Cycle (Seconds): 10
Consent: The patient's consent was obtained including but not limited to risks of crusting, scabbing, blistering, scarring, darker or lighter pigmentary change, recurrence, incomplete removal and infection.
Post-Care Instructions: I reviewed with the patient in detail post-care instructions. Patient is to wear sunprotection, and avoid picking at any of the treated lesions. Pt may apply Vaseline to crusted or scabbing areas.
Medical Necessity Clause: This procedure was medically necessary because the lesions that were treated were:
Duration Of Freeze Thaw-Cycle (Seconds): 15-20
Medical Necessity Information: It is in your best interest to select a reason for this procedure from the list below. All of these items fulfill various CMS LCD requirements except the new and changing color options.

## 2020-11-16 ENCOUNTER — HEALTH MAINTENANCE LETTER (OUTPATIENT)
Age: 85
End: 2020-11-16

## 2021-01-19 PROBLEM — R41.3 MEMORY LOSS: Status: ACTIVE | Noted: 2018-11-12

## 2021-01-19 PROBLEM — Z86.79 HISTORY OF SUBARACHNOID HEMORRHAGE: Status: ACTIVE | Noted: 2021-01-19

## 2021-01-20 ENCOUNTER — OFFICE VISIT (OUTPATIENT)
Dept: INTERNAL MEDICINE | Facility: CLINIC | Age: 86
End: 2021-01-20
Payer: COMMERCIAL

## 2021-01-20 VITALS
SYSTOLIC BLOOD PRESSURE: 110 MMHG | BODY MASS INDEX: 25.03 KG/M2 | OXYGEN SATURATION: 97 % | HEART RATE: 63 BPM | TEMPERATURE: 98.6 F | DIASTOLIC BLOOD PRESSURE: 64 MMHG | WEIGHT: 159.8 LBS

## 2021-01-20 DIAGNOSIS — H61.23 CERUMINOSIS, BILATERAL: ICD-10-CM

## 2021-01-20 DIAGNOSIS — J32.9 CHRONIC SINUSITIS, UNSPECIFIED LOCATION: Primary | ICD-10-CM

## 2021-01-20 PROCEDURE — 99213 OFFICE O/P EST LOW 20 MIN: CPT | Performed by: INTERNAL MEDICINE

## 2021-01-20 NOTE — PROGRESS NOTES
Assessment & Plan     Chronic sinusitis, unspecified location          We discussed another trial of antibiotics, versus doing imaging.        - amoxicillin-clavulanate (AUGMENTIN) 875-125 MG tablet  Dispense: 14 tablet; Refill: 0    Ceruminosis, bilateral                               Patient Instructions               Let's do this: take the antibiotic for a week.              Please send to me a My Chart message with a progress report after that.                             Your new password for My Chart is [email or web address].                                                                                 Return in about 3 months (around 4/20/2021) for follow up of several issues.    Jose Maria Valera MD  [facility]    Tracy Olguin is a 85 year old who presents to clinic today for the following health issues     HPI     Chief Complaint   Patient presents with     Allergies     Tinnitus                       This patient presents with his usual list of allergy/sinus related symptoms, including sneezing, nasal congestion, decreased hearing, tinnitus, rhinorrhea, excess tearing, with occasional headaches and restless sleep.         He is practicing deep breathing, and he walks 1 mile per day without difficulty.              He has tried various allergy type remedies in the past including antihistamines and nasal sprays.  At 1 point ENT had him doing sinus irrigation but he stopped it after a while as it seemed to involved.           He believes that his memory is stable and he is competent and not confused.  3 years ago he had mentioned that he felt he was developing some memory issues, which at least in part he attributed to a previous subarachnoid hemorrhage.                                                                Of note is that after his perforated appendix last summer, and with treatment using Augmentin for his abdominal situation, he reported that his sinus symptoms  "were dramatically improved.   That is reflected in my office note from last August.      Patient Active Problem List    Diagnosis Date Noted     Chronic midline low back pain with right-sided sciatica 11/04/2017     Priority: High       MRI indicates among other findings, multilevel foraminal stenosis, bilaterally, worse on the R at L4-5,and on the L at L2-3 and L3-4.                  See spine consult.         TESS 12/17 ; a second one was not helpful.                                                                          EMG NORMAL IN 2/18       SUBARACHNOID HEMORRHAGE-4/2007 05/09/2007     Priority: High     R frontal; occurred while out of town; see f/u MRI 2007       (and 3/15)                Some STM loss with this stroke       History of subarachnoid hemorrhage 01/19/2021     Priority: Medium     Appendicitis, unspecified appendicitis type 07/30/2020     Priority: Medium     Added automatically from request for surgery [id number]       Ruptured appendicitis 07/30/2020     Priority: Medium     Macular degeneration (senile) of retina 08/25/2019     Priority: Medium     see ophth note 6/18; \"early,dry\" ; f/u 4/19       Memory loss 11/12/2018     Priority: Medium     in part related to SAH in the past ; noted by pt since at least 2017.           B12 and TSH nml in 11/18.  MRI brain ok in 2015       Greater trochanteric bursitis of right hip 02/17/2018     Priority: Medium     Chronic sinusitis, unspecified location 12/20/2017     Priority: Medium     Good relief with Augmentin in 8/20       Pain of right thigh 07/11/2017     Priority: Medium     Pain of right lower extremity 07/03/2017     Priority: Medium     Unsteady gait 02/25/2015     Priority: Medium     Dizziness 02/25/2015     Priority: Medium     MRI brain neg in 3/15       Cough 02/25/2015     Priority: Medium     CXR neg in 6/15       Headache 02/25/2015     Priority: Medium     ESR=11  Problem list name updated by automated process. Provider to review   "     Fatigue 02/25/2015     Priority: Medium     Dermatitis 02/25/2015     Priority: Medium     Family history of colonic polyps 01/28/2014     Priority: Medium     Father?       Intermittent asthma 03/20/2012     Priority: Medium     See allergy notes; immunotherapy did not help per pt       Benign prostatic hyperplasia with urinary hesitancy 09/21/2010     Priority: Medium     Prostate exam 2/15; minimal if any enlargement; likewise in 4/16 and in 11/18       Hyperlipidemia 09/21/2010     Priority: Medium     DIS OF GALLBLADDER NEC-- polyp -- 2/2007 02/07/2007     Priority: Medium     No change through 9/10; no change in 2/14; a tiny GB polyp       Preventive measure 01/28/2014     Priority: Low     Colonoscopy 12/08; 3/14 neg     PSA 3.89 in 9/10         Counseling on health care directive 07/31/2012     Priority: Low     Past Surgical History:   Procedure Laterality Date     LAPAROSCOPIC APPENDECTOMY N/A 7/30/2020    Procedure: APPENDECTOMY, LAPAROSCOPIC;  Surgeon: Mercedes Sommer MD;  Location:  OR     Current Outpatient Medications   Medication Sig Dispense Refill     amoxicillin-clavulanate (AUGMENTIN) 875-125 MG tablet Take 1 tablet by mouth 2 times daily 14 tablet 0     Immunization History   Administered Date(s) Administered     HEPA 06/04/1996, 03/27/1997     Influenza (High Dose) 3 valent vaccine 11/11/2012, 10/27/2014, 10/17/2016, 11/04/2017, 10/23/2018     Influenza (IIV3) PF 11/07/2002, 11/06/2007, 11/21/2008, 12/09/2009, 09/21/2010, 11/03/2012, 10/28/2013     Pneumo Conj 13-V (2010&after) 12/28/2016     Pneumococcal 23 valent 10/25/2001, 09/29/2010, 10/27/2014     Poliovirus, inactivated (IPV) 03/27/1997     TD (ADULT, 7+) 03/27/1997, 08/13/2007     TDAP Vaccine (Adacel) 12/19/2016     Typhoid IM 09/03/2003, 12/19/2016     Typhoid Oral 12/09/2009     Yellow Fever 06/04/1996     Zoster vaccine, live 01/28/2014       Review of Systems         Wt Readings from Last 4 Encounters:   01/20/21 72.5 kg  (159 lb 12.8 oz)   08/11/20 70.5 kg (155 lb 8 oz)   08/04/20 72.5 kg (159 lb 14.4 oz)   06/23/20 74.4 kg (164 lb)       Objective    /64   Pulse 63   Temp 98.6  F (37  C) (Temporal)   Wt 72.5 kg (159 lb 12.8 oz)   SpO2 97%   BMI 25.03 kg/m    Body mass index is 25.03 kg/m .  Physical Exam   HENT: Cerumen in both ear canals removed by me with an ear pick  RESP: lungs clear to auscultation - no rales, rhonchi or wheezes  CV: regular rates and rhythm, normal S1 S2, no S3 or S4 and no murmur, click or rub  NEURO: Normal strength and tone and speech normal

## 2021-01-20 NOTE — PATIENT INSTRUCTIONS
Let's do this: take the antibiotic for a week.              Please send to me a My Chart message with a progress report after that.                             Your new password for My Chart is [email or web address].

## 2021-01-28 ENCOUNTER — TELEPHONE (OUTPATIENT)
Dept: INTERNAL MEDICINE | Facility: CLINIC | Age: 86
End: 2021-01-28

## 2021-01-28 DIAGNOSIS — J32.9 CHRONIC SINUSITIS, UNSPECIFIED LOCATION: ICD-10-CM

## 2021-01-28 NOTE — TELEPHONE ENCOUNTER
Patient called clinic stating he was advised to call with update for PCP after completing Rx Augmentin for sinusitis.     Patient reports symptoms improved---did not completely resolve.     Ongoing Symptoms:  watery eyes, sneezing. tightness in throat.   Ongoing clear drainage from nose  Still feels clogged in sinuses.   HA--intermittent   Cough improved.   Denies wheezing  Denies ST  Denies fever    Patient not taking any OTC meds.   Patient reports not using saline nasal spray/wash  Patient reports staying hydrated.      Pharmacy pended.      Please follow up with patient.    [phone]  Triage may leave detailed msg/orders on voice mail.       Dolly HOFF RN,BSN

## 2021-01-29 NOTE — TELEPHONE ENCOUNTER
We will plan 1 more week of antibiotics.  Rx sent.              If his symptoms continue, we will plan a CT of the sinuses or an ENT consult.                            Please let him know.

## 2021-02-12 ENCOUNTER — TELEPHONE (OUTPATIENT)
Dept: INTERNAL MEDICINE | Facility: CLINIC | Age: 86
End: 2021-02-12

## 2021-02-12 DIAGNOSIS — J32.8 OTHER CHRONIC SINUSITIS: Primary | ICD-10-CM

## 2021-02-12 NOTE — TELEPHONE ENCOUNTER
I have generated a referral for a CT of the sinuses at [facility].                        Please send the referral, and also please let the patient know the pertinent information (phone number to call etc).                            Depending on these results, he may need an ENT consult.

## 2021-02-12 NOTE — TELEPHONE ENCOUNTER
Left detailed message for pt on voicemail.--with info below, and to call and schedule CT scan at [facility].  Clinic Phone: [phone] or [phone].    Order faxed to:  [facility]  Fax: [phone]    ADDRESS:  [facility], [address]  [address] [place] [address]

## 2021-02-12 NOTE — TELEPHONE ENCOUNTER
Pt called:     Was given antibiotic about a month ago for sinus problems     And a second dose recently and supposed to report back on MyChart - having trouble accessing MyChart    Not completely resolved, still having periodic pressure headaches, sneezing improves, still having nasal drainage    Improved but certainly not cured     No cough or fever   Dripping from nose - draining back, doesn't see the color of drainage    Pt asking what the next step(s) are to addressing his sinus problems     Please advise    [phone] (detailed message is okay)     Cathryn CRAIG RN

## 2021-02-19 ENCOUNTER — TRANSFERRED RECORDS (OUTPATIENT)
Dept: HEALTH INFORMATION MANAGEMENT | Facility: CLINIC | Age: 86
End: 2021-02-19

## 2021-03-02 ENCOUNTER — IMMUNIZATION (OUTPATIENT)
Dept: NURSING | Facility: CLINIC | Age: 86
End: 2021-03-02
Payer: COMMERCIAL

## 2021-03-02 PROCEDURE — 91300 PR COVID VAC PFIZER DIL RECON 30 MCG/0.3 ML IM: CPT

## 2021-03-02 PROCEDURE — 0001A PR COVID VAC PFIZER DIL RECON 30 MCG/0.3 ML IM: CPT

## 2021-03-05 ENCOUNTER — TELEPHONE (OUTPATIENT)
Dept: INTERNAL MEDICINE | Facility: CLINIC | Age: 86
End: 2021-03-05

## 2021-03-05 NOTE — TELEPHONE ENCOUNTER
Patient looking for CT results of sinuses that he had done 2/19 at [facility]. Nothing seen under media or in PCPs folder. Called [facility] and they will fax results. Awaiting results. Patient wants call back, having troubles with [employer] right now (help desk # given)

## 2021-03-08 PROBLEM — J32.9 CHRONIC SINUSITIS, UNSPECIFIED LOCATION: Status: ACTIVE | Noted: 2017-12-20

## 2021-03-08 NOTE — TELEPHONE ENCOUNTER
Pt called check on results     [phone] - detailed message is okay (difficulty hearing so if leaving a message please speak loud and clear)     Cathryn CRAIG RN

## 2021-03-08 NOTE — TELEPHONE ENCOUNTER
CT neg.       D/w pt.                       He has started using claritin.              I suggested adding back Flonase also.                He agrees.

## 2021-03-23 ENCOUNTER — IMMUNIZATION (OUTPATIENT)
Dept: NURSING | Facility: CLINIC | Age: 86
End: 2021-03-23
Attending: FAMILY MEDICINE
Payer: COMMERCIAL

## 2021-03-23 PROCEDURE — 91300 PR COVID VAC PFIZER DIL RECON 30 MCG/0.3 ML IM: CPT

## 2021-03-23 PROCEDURE — 0002A PR COVID VAC PFIZER DIL RECON 30 MCG/0.3 ML IM: CPT

## 2021-04-03 ENCOUNTER — HEALTH MAINTENANCE LETTER (OUTPATIENT)
Age: 86
End: 2021-04-03

## 2021-05-13 ENCOUNTER — OFFICE VISIT (OUTPATIENT)
Dept: INTERNAL MEDICINE | Facility: CLINIC | Age: 86
End: 2021-05-13
Payer: COMMERCIAL

## 2021-05-13 VITALS
DIASTOLIC BLOOD PRESSURE: 60 MMHG | BODY MASS INDEX: 24.59 KG/M2 | HEART RATE: 59 BPM | WEIGHT: 157 LBS | SYSTOLIC BLOOD PRESSURE: 114 MMHG | OXYGEN SATURATION: 98 %

## 2021-05-13 DIAGNOSIS — N48.1 BALANITIS: ICD-10-CM

## 2021-05-13 DIAGNOSIS — R09.82 POST-NASAL DRAINAGE: Primary | ICD-10-CM

## 2021-05-13 PROCEDURE — 99213 OFFICE O/P EST LOW 20 MIN: CPT | Performed by: INTERNAL MEDICINE

## 2021-05-13 RX ORDER — CLOTRIMAZOLE 1 %
CREAM (GRAM) TOPICAL 2 TIMES DAILY
Qty: 30 G | Refills: 4 | Status: SHIPPED | OUTPATIENT
Start: 2021-05-13 | End: 2022-01-04

## 2021-05-13 RX ORDER — FLUTICASONE PROPIONATE 50 MCG
1 SPRAY, SUSPENSION (ML) NASAL DAILY
COMMUNITY
Start: 2021-05-13 | End: 2022-01-04

## 2021-05-13 RX ORDER — LORATADINE 10 MG/1
10 TABLET ORAL DAILY PRN
COMMUNITY
Start: 2021-05-13 | End: 2022-01-04

## 2021-05-13 NOTE — PROGRESS NOTES
"    Assessment & Plan     Post-nasal drainage  Continue using Flonase year-round    Balanitis  Rx ordered  - clotrimazole (LOTRIMIN) 1 % external cream  Dispense: 30 g; Refill: 4               BMI:   Estimated body mass index is 24.59 kg/m  as calculated from the following:    Height as of 7/30/20: 1.702 m (5' 7\").    Weight as of this encounter: 71.2 kg (157 lb).       Patient Instructions               Keep using the Flonase.                  Use the lotrimin cream under the foreskin twice per day for a few weeks.                                   Please note that I am planning to retire on December 31, 2021.              You are welcome to continue your care through this clinic.              For a variety of reasons I will not refer you to a specific provider.                  Another option of course is to switch to a clinic which is closer to your residence.           No follow-ups on file.    Jose Maria Valera MD  [facility]    Tracy Olguin is a 85 year old who presents for the following health issues     HPI     Recheck sinus symptoms - has been using OTC, flonase seems to help    Rash under foreskin x 1 month               This patient is found that he needs to keep using Flonase every day in order to avoid symptoms of head congestion, ear pressure, etc.  However, when he does take Flonase, his symptoms are largely under good control.                                  He has also noted some irritation at the base of the glans penis.                             He observes that he and his wife are moving to a senior apartment closer to the [facility] soon.    Review of Systems         Current Outpatient Medications   Medication Sig Dispense Refill     clotrimazole (LOTRIMIN) 1 % external cream Apply topically 2 times daily Apply to the glans penis twice per day. 30 g 4     fluticasone (FLONASE) 50 MCG/ACT nasal spray Spray 1 spray into both nostrils daily   "     loratadine (CLARITIN) 10 MG tablet Take 1 tablet (10 mg) by mouth daily as needed for allergies       Wt Readings from Last 4 Encounters:   05/13/21 71.2 kg (157 lb)   01/20/21 72.5 kg (159 lb 12.8 oz)   08/11/20 70.5 kg (155 lb 8 oz)   08/04/20 72.5 kg (159 lb 14.4 oz)       Objective    /60   Pulse 59   Wt 71.2 kg (157 lb)   SpO2 98%   BMI 24.59 kg/m    Body mass index is 24.59 kg/m .  Physical Exam    (male): His foreskin easily retracts.  There is erythema at the base of the glans penis

## 2021-05-13 NOTE — PATIENT INSTRUCTIONS
Keep using the Flonase.                  Use the lotrimin cream under the foreskin twice per day for a few weeks.                                   Please note that I am planning to retire on December 31, 2021.              You are welcome to continue your care through this clinic.              For a variety of reasons I will not refer you to a specific provider.                  Another option of course is to switch to a clinic which is closer to your residence.

## 2021-07-30 ENCOUNTER — MEDICAL CORRESPONDENCE (OUTPATIENT)
Dept: HEALTH INFORMATION MANAGEMENT | Facility: CLINIC | Age: 86
End: 2021-07-30

## 2021-08-11 ENCOUNTER — OFFICE VISIT (OUTPATIENT)
Dept: INTERNAL MEDICINE | Facility: CLINIC | Age: 86
End: 2021-08-11
Payer: COMMERCIAL

## 2021-08-11 VITALS
DIASTOLIC BLOOD PRESSURE: 68 MMHG | TEMPERATURE: 98.4 F | SYSTOLIC BLOOD PRESSURE: 120 MMHG | OXYGEN SATURATION: 97 % | WEIGHT: 157 LBS | BODY MASS INDEX: 24.59 KG/M2 | HEART RATE: 63 BPM

## 2021-08-11 DIAGNOSIS — F43.23 ADJUSTMENT DISORDER WITH MIXED ANXIETY AND DEPRESSED MOOD: ICD-10-CM

## 2021-08-11 DIAGNOSIS — J32.9 CHRONIC SINUSITIS, UNSPECIFIED LOCATION: Primary | ICD-10-CM

## 2021-08-11 PROCEDURE — 99213 OFFICE O/P EST LOW 20 MIN: CPT | Performed by: INTERNAL MEDICINE

## 2021-08-11 RX ORDER — AZELASTINE HYDROCHLORIDE, FLUTICASONE PROPIONATE 137; 50 UG/1; UG/1
1 SPRAY, METERED NASAL 2 TIMES DAILY
Qty: 23 G | Refills: 4 | Status: SHIPPED | OUTPATIENT
Start: 2021-08-11 | End: 2022-08-30

## 2021-08-11 NOTE — PROGRESS NOTES
Assessment & Plan     Chronic sinusitis, unspecified location  See patient instructions  - azelastine-fluticasone (DYMISTA) 137-50 MCG/ACT nasal spray  Dispense: 23 g; Refill: 4    Adjustment disorder with mixed anxiety and depressed mood  Referral placed  - MENTAL HEALTH REFERRAL  - Adult; Outpatient Treatment; Individual/Couples/Family/Group Therapy/Health Psychology; [facility] [phone]; We will contact you to schedule the appointment or please call with any questions               Patient Instructions   Let's try this: use the Dymista 1 spray in each nostril twice per day instead of the Flonase.                   Consider using nasal saline irrigation again.                   You are planning to work with a counselor going forward.       Return in about 3 months (around 11/15/2021) for yearly wellness visit, labs will be needed, skip breakfast on the day of your next appointment.    Jose Maria Valera MD  [facility]    Tracy Olguin is a 85 year old who presents for the following health issues     HPI     Recheck headaches - have been better the last week      This patient has a several year history of a variety of symptoms suggesting chronic sinusitis, including nasal and postnasal congestion and drainage, intermittent headaches and tinnitus.                                                               He brings in a letter describing the symptoms for the past few weeks.    I will have the letter from the pt detailing his sx for the past month scanned.                          He gets some relief with Flonase but then he neglects to continue taking it.              he has tried sinus patient in the past.       He had a CT of his sinuses showing mucosal thickening diffusely.                      He has seen ENT.  He has seen an allergist and immunotherapy did not seem to help.                          He and his wife recently moved to a senior  apartment.  He reports this change has been a very difficult adjustment for him.  He would like to speak to a counselor.    Review of Systems         Current Outpatient Medications   Medication Sig Dispense Refill     azelastine-fluticasone (DYMISTA) 137-50 MCG/ACT nasal spray Spray 1 spray into both nostrils 2 times daily 23 g 4     clotrimazole (LOTRIMIN) 1 % external cream Apply topically 2 times daily Apply to the glans penis twice per day. 30 g 4     fluticasone (FLONASE) 50 MCG/ACT nasal spray Spray 1 spray into both nostrils daily       loratadine (CLARITIN) 10 MG tablet Take 1 tablet (10 mg) by mouth daily as needed for allergies       Wt Readings from Last 4 Encounters:   08/11/21 71.2 kg (157 lb)   05/13/21 71.2 kg (157 lb)   01/20/21 72.5 kg (159 lb 12.8 oz)   08/11/20 70.5 kg (155 lb 8 oz)       Objective    /68   Pulse 63   Temp 98.4  F (36.9  C) (Oral)   Wt 71.2 kg (157 lb)   SpO2 97%   BMI 24.59 kg/m    Body mass index is 24.59 kg/m .  Physical Exam   GENERAL APPEARANCE: alert, no distress and fatigued  HENT: ear canals and TM's normal and nose and mouth without ulcers or lesions  NECK: no adenopathy, no asymmetry, masses, or scars and thyroid normal to palpation  CV: regular rates and rhythm, normal S1 S2, no S3 or S4 and no murmur, click or rub  PSYCH: anxious

## 2021-08-11 NOTE — PATIENT INSTRUCTIONS
Let's try this: use the Dymista 1 spray in each nostril twice per day instead of the Flonase.                   Consider using nasal saline irrigation again.                   You are planning to work with a counselor going forward.

## 2021-09-18 ENCOUNTER — HEALTH MAINTENANCE LETTER (OUTPATIENT)
Age: 86
End: 2021-09-18

## 2021-09-27 ENCOUNTER — APPOINTMENT (OUTPATIENT)
Dept: URBAN - METROPOLITAN AREA CLINIC 255 | Age: 86
Setting detail: DERMATOLOGY
End: 2021-10-10

## 2021-09-27 DIAGNOSIS — L82.0 INFLAMED SEBORRHEIC KERATOSIS: ICD-10-CM

## 2021-09-27 DIAGNOSIS — L81.4 OTHER MELANIN HYPERPIGMENTATION: ICD-10-CM

## 2021-09-27 DIAGNOSIS — Z85.828 PERSONAL HISTORY OF OTHER MALIGNANT NEOPLASM OF SKIN: ICD-10-CM

## 2021-09-27 DIAGNOSIS — D22 MELANOCYTIC NEVI: ICD-10-CM

## 2021-09-27 DIAGNOSIS — D18.0 HEMANGIOMA: ICD-10-CM

## 2021-09-27 DIAGNOSIS — L57.0 ACTINIC KERATOSIS: ICD-10-CM

## 2021-09-27 DIAGNOSIS — L82.1 OTHER SEBORRHEIC KERATOSIS: ICD-10-CM

## 2021-09-27 PROBLEM — D18.01 HEMANGIOMA OF SKIN AND SUBCUTANEOUS TISSUE: Status: ACTIVE | Noted: 2021-09-27

## 2021-09-27 PROBLEM — D22.5 MELANOCYTIC NEVI OF TRUNK: Status: ACTIVE | Noted: 2021-09-27

## 2021-09-27 PROCEDURE — OTHER COUNSELING: OTHER

## 2021-09-27 PROCEDURE — OTHER DEFER: OTHER

## 2021-09-27 PROCEDURE — 17000 DESTRUCT PREMALG LESION: CPT

## 2021-09-27 PROCEDURE — OTHER MIPS QUALITY: OTHER

## 2021-09-27 PROCEDURE — 17003 DESTRUCT PREMALG LES 2-14: CPT

## 2021-09-27 PROCEDURE — 99213 OFFICE O/P EST LOW 20 MIN: CPT | Mod: 25

## 2021-09-27 PROCEDURE — OTHER LIQUID NITROGEN: OTHER

## 2021-09-27 ASSESSMENT — LOCATION DETAILED DESCRIPTION DERM
LOCATION DETAILED: RIGHT MEDIAL UPPER BACK
LOCATION DETAILED: LEFT CENTRAL MALAR CHEEK
LOCATION DETAILED: RIGHT INFERIOR MEDIAL UPPER BACK
LOCATION DETAILED: LEFT SUPERIOR FOREHEAD
LOCATION DETAILED: LEFT SUPERIOR MEDIAL UPPER BACK
LOCATION DETAILED: RIGHT SUPERIOR LATERAL FOREHEAD
LOCATION DETAILED: LEFT SUPERIOR FOREHEAD
LOCATION DETAILED: RIGHT FOREHEAD

## 2021-09-27 ASSESSMENT — LOCATION SIMPLE DESCRIPTION DERM
LOCATION SIMPLE: LEFT FOREHEAD
LOCATION SIMPLE: RIGHT FOREHEAD
LOCATION SIMPLE: LEFT UPPER BACK
LOCATION SIMPLE: LEFT FOREHEAD
LOCATION SIMPLE: LEFT CHEEK
LOCATION SIMPLE: RIGHT UPPER BACK

## 2021-09-27 ASSESSMENT — LOCATION ZONE DERM
LOCATION ZONE: TRUNK
LOCATION ZONE: FACE
LOCATION ZONE: FACE

## 2021-09-27 NOTE — PROCEDURE: MIPS QUALITY
Detail Level: Detailed
Quality 226: Preventive Care And Screening: Tobacco Use: Screening And Cessation Intervention: Patient screened for tobacco use and is an ex/non-smoker
Quality 110: Preventive Care And Screening: Influenza Immunization: Influenza Immunization Administered during Influenza season
Quality 431: Preventive Care And Screening: Unhealthy Alcohol Use - Screening: Patient not identified as an unhealthy alcohol user when screened for unhealthy alcohol use using a systematic screening method
Quality 111:Pneumonia Vaccination Status For Older Adults: Pneumococcal Vaccination Previously Received
Quality 130: Documentation Of Current Medications In The Medical Record: Current Medications Documented

## 2021-09-27 NOTE — PROCEDURE: DEFER
Detail Level: Detailed
Introduction Text (Please End With A Colon): The following procedure was deferred: dressing change.

## 2021-09-27 NOTE — PROCEDURE: LIQUID NITROGEN
Show Aperture Variable?: Yes
Duration Of Freeze Thaw-Cycle (Seconds): 10
Render Post-Care Instructions In Note?: no
Post-Care Instructions: I reviewed with the patient in detail post-care instructions. Patient is to wear sunprotection, and avoid picking at any of the treated lesions. Pt may apply Vaseline to crusted or scabbing areas.
Consent: The patient's consent was obtained including but not limited to risks of crusting, scabbing, blistering, scarring, darker or lighter pigmentary change, recurrence, incomplete removal and infection.
Detail Level: Detailed
Number Of Freeze-Thaw Cycles: 1 freeze-thaw cycle

## 2021-11-13 PROBLEM — K35.32 RUPTURED APPENDICITIS: Status: RESOLVED | Noted: 2020-07-30 | Resolved: 2021-11-13

## 2021-11-13 PROBLEM — K37 APPENDICITIS, UNSPECIFIED APPENDICITIS TYPE: Status: RESOLVED | Noted: 2020-07-30 | Resolved: 2021-11-13

## 2021-11-15 ENCOUNTER — OFFICE VISIT (OUTPATIENT)
Dept: INTERNAL MEDICINE | Facility: CLINIC | Age: 86
End: 2021-11-15
Payer: COMMERCIAL

## 2021-11-15 VITALS
HEIGHT: 67 IN | HEART RATE: 70 BPM | TEMPERATURE: 97.7 F | DIASTOLIC BLOOD PRESSURE: 56 MMHG | RESPIRATION RATE: 16 BRPM | BODY MASS INDEX: 26.15 KG/M2 | OXYGEN SATURATION: 98 % | WEIGHT: 166.6 LBS | SYSTOLIC BLOOD PRESSURE: 102 MMHG

## 2021-11-15 DIAGNOSIS — N40.1 BENIGN PROSTATIC HYPERPLASIA WITH URINARY HESITANCY: ICD-10-CM

## 2021-11-15 DIAGNOSIS — N28.9 RENAL INSUFFICIENCY: ICD-10-CM

## 2021-11-15 DIAGNOSIS — M25.551 LATERAL PAIN OF RIGHT HIP: Primary | ICD-10-CM

## 2021-11-15 DIAGNOSIS — R39.11 BENIGN PROSTATIC HYPERPLASIA WITH URINARY HESITANCY: ICD-10-CM

## 2021-11-15 DIAGNOSIS — J32.9 CHRONIC SINUSITIS, UNSPECIFIED LOCATION: ICD-10-CM

## 2021-11-15 LAB
ALBUMIN SERPL-MCNC: 3.4 G/DL (ref 3.4–5)
ALP SERPL-CCNC: 71 U/L (ref 40–150)
ALT SERPL W P-5'-P-CCNC: 21 U/L (ref 0–70)
ANION GAP SERPL CALCULATED.3IONS-SCNC: <1 MMOL/L (ref 3–14)
AST SERPL W P-5'-P-CCNC: 16 U/L (ref 0–45)
BASOPHILS # BLD AUTO: 0 10E3/UL (ref 0–0.2)
BASOPHILS NFR BLD AUTO: 1 %
BILIRUB SERPL-MCNC: 0.4 MG/DL (ref 0.2–1.3)
BUN SERPL-MCNC: 18 MG/DL (ref 7–30)
CALCIUM SERPL-MCNC: 9 MG/DL (ref 8.5–10.1)
CHLORIDE BLD-SCNC: 111 MMOL/L (ref 94–109)
CO2 SERPL-SCNC: 31 MMOL/L (ref 20–32)
CREAT SERPL-MCNC: 1.18 MG/DL (ref 0.66–1.25)
EOSINOPHIL # BLD AUTO: 0.3 10E3/UL (ref 0–0.7)
EOSINOPHIL NFR BLD AUTO: 8 %
ERYTHROCYTE [DISTWIDTH] IN BLOOD BY AUTOMATED COUNT: 13.5 % (ref 10–15)
GFR SERPL CREATININE-BSD FRML MDRD: 56 ML/MIN/1.73M2
GLUCOSE BLD-MCNC: 83 MG/DL (ref 70–99)
HCT VFR BLD AUTO: 42.8 % (ref 40–53)
HGB BLD-MCNC: 13.8 G/DL (ref 13.3–17.7)
LYMPHOCYTES # BLD AUTO: 1.3 10E3/UL (ref 0.8–5.3)
LYMPHOCYTES NFR BLD AUTO: 34 %
MCH RBC QN AUTO: 31.6 PG (ref 26.5–33)
MCHC RBC AUTO-ENTMCNC: 32.2 G/DL (ref 31.5–36.5)
MCV RBC AUTO: 98 FL (ref 78–100)
MONOCYTES # BLD AUTO: 0.5 10E3/UL (ref 0–1.3)
MONOCYTES NFR BLD AUTO: 13 %
NEUTROPHILS # BLD AUTO: 1.7 10E3/UL (ref 1.6–8.3)
NEUTROPHILS NFR BLD AUTO: 45 %
PLATELET # BLD AUTO: 196 10E3/UL (ref 150–450)
POTASSIUM BLD-SCNC: 4 MMOL/L (ref 3.4–5.3)
PROT SERPL-MCNC: 7.1 G/DL (ref 6.8–8.8)
RBC # BLD AUTO: 4.37 10E6/UL (ref 4.4–5.9)
SODIUM SERPL-SCNC: 142 MMOL/L (ref 133–144)
WBC # BLD AUTO: 3.8 10E3/UL (ref 4–11)

## 2021-11-15 PROCEDURE — 99213 OFFICE O/P EST LOW 20 MIN: CPT | Mod: 25 | Performed by: INTERNAL MEDICINE

## 2021-11-15 PROCEDURE — 80053 COMPREHEN METABOLIC PANEL: CPT | Performed by: INTERNAL MEDICINE

## 2021-11-15 PROCEDURE — 36415 COLL VENOUS BLD VENIPUNCTURE: CPT | Performed by: INTERNAL MEDICINE

## 2021-11-15 PROCEDURE — 91300 COVID-19,PF,PFIZER (12+ YRS): CPT | Performed by: INTERNAL MEDICINE

## 2021-11-15 PROCEDURE — 85025 COMPLETE CBC W/AUTO DIFF WBC: CPT | Performed by: INTERNAL MEDICINE

## 2021-11-15 PROCEDURE — 0004A COVID-19,PF,PFIZER (12+ YRS): CPT | Performed by: INTERNAL MEDICINE

## 2021-11-15 ASSESSMENT — MIFFLIN-ST. JEOR: SCORE: 1404.32

## 2021-11-15 NOTE — PATIENT INSTRUCTIONS
"     Consider an orthopedics consult regarding your lateral R hip pain.              The lumbar epidural injections in 2017/2018 were not helpful.         The EMG did not suggest that you had \"pinched nerve\" issues due to a spine condition.            Right hip xrays just showed mild arthritis in 2017.              Perhaps an injection in the greater trochanteric bursa could be helpful.                                                               Best wishes going forward!   "

## 2021-11-15 NOTE — LETTER
November 15, 2021      Sharif Kumar  [address]  [facility] [address]        Dear ,    We are writing to inform you of your test results.               Good results.      Your lab results are normal or stable,including the liver,kidney,glucose, and the bone marrow function.       Results for orders placed or performed in visit on 11/15/21   Comprehensive metabolic panel (BMP + Alb, Alk Phos, ALT, AST, Total. Bili, TP)     Status: Abnormal   Result Value Ref Range    Sodium 142 133 - 144 mmol/L    Potassium 4.0 3.4 - 5.3 mmol/L    Chloride 111 (H) 94 - 109 mmol/L    Carbon Dioxide (CO2) 31 20 - 32 mmol/L    Anion Gap <1 (L) 3 - 14 mmol/L    Urea Nitrogen 18 7 - 30 mg/dL    Creatinine 1.18 0.66 - 1.25 mg/dL    Calcium 9.0 8.5 - 10.1 mg/dL    Glucose 83 70 - 99 mg/dL    Alkaline Phosphatase 71 40 - 150 U/L    AST 16 0 - 45 U/L    ALT 21 0 - 70 U/L    Protein Total 7.1 6.8 - 8.8 g/dL    Albumin 3.4 3.4 - 5.0 g/dL    Bilirubin Total 0.4 0.2 - 1.3 mg/dL    GFR Estimate 56 (L) >60 mL/min/1.73m2   CBC with platelets and differential     Status: Abnormal   Result Value Ref Range    WBC Count 3.8 (L) 4.0 - 11.0 10e3/uL    RBC Count 4.37 (L) 4.40 - 5.90 10e6/uL    Hemoglobin 13.8 13.3 - 17.7 g/dL    Hematocrit 42.8 40.0 - 53.0 %    MCV 98 78 - 100 fL    MCH 31.6 26.5 - 33.0 pg    MCHC 32.2 31.5 - 36.5 g/dL    RDW 13.5 10.0 - 15.0 %    Platelet Count 196 150 - 450 10e3/uL    % Neutrophils 45 %    % Lymphocytes 34 %    % Monocytes 13 %    % Eosinophils 8 %    % Basophils 1 %    Absolute Neutrophils 1.7 1.6 - 8.3 10e3/uL    Absolute Lymphocytes 1.3 0.8 - 5.3 10e3/uL    Absolute Monocytes 0.5 0.0 - 1.3 10e3/uL    Absolute Eosinophils 0.3 0.0 - 0.7 10e3/uL    Absolute Basophils 0.0 0.0 - 0.2 10e3/uL   CBC with platelets and differential     Status: Abnormal    Narrative    The following orders were created for panel order CBC with platelets and differential.  Procedure                                Abnormality         Status                     ---------                               -----------         ------                     CBC with platelets and d...[[id number]]  Abnormal            Final result                 Please view results for these tests on the individual orders.         If you have any questions or concerns, please call the clinic at the number listed above.       Sincerely,    Jose Maria Valera MD

## 2021-11-15 NOTE — PROGRESS NOTES
"  Assessment & Plan           Lateral pain of right hip  See patient instructions    Chronic sinusitis, unspecified location  Continue current therapy  - CBC with platelets and differential      Benign prostatic hyperplasia with urinary hesitancy  Normal prostate exam and good urine flow    Renal insufficiency  Labs are pending  - Comprehensive metabolic panel (BMP + Alb, Alk Phos, ALT, AST, Total. Bili, TP)                 BMI:   Estimated body mass index is 26.09 kg/m  as calculated from the following:    Height as of this encounter: 1.702 m (5' 7\").    Weight as of this encounter: 75.6 kg (166 lb 9.6 oz).       Patient Instructions        Consider an orthopedics consult regarding your lateral R hip pain.              The lumbar epidural injections in 2017/2018 were not helpful.         The EMG did not suggest that you had \"pinched nerve\" issues due to a spine condition.            Right hip xrays just showed mild arthritis in 2017.              Perhaps an injection in the greater trochanteric bursa could be helpful.                                                               Best wishes going forward!       Return in about 6 months (around 5/15/2022) for follow up of several issues.     Jose Maria Valera MD  [facility]  ADDENDUM:  Results for orders placed or performed in visit on 11/15/21   Comprehensive metabolic panel (BMP + Alb, Alk Phos, ALT, AST, Total. Bili, TP)     Status: Abnormal   Result Value Ref Range    Sodium 142 133 - 144 mmol/L    Potassium 4.0 3.4 - 5.3 mmol/L    Chloride 111 (H) 94 - 109 mmol/L    Carbon Dioxide (CO2) 31 20 - 32 mmol/L    Anion Gap <1 (L) 3 - 14 mmol/L    Urea Nitrogen 18 7 - 30 mg/dL    Creatinine 1.18 0.66 - 1.25 mg/dL    Calcium 9.0 8.5 - 10.1 mg/dL    Glucose 83 70 - 99 mg/dL    Alkaline Phosphatase 71 40 - 150 U/L    AST 16 0 - 45 U/L    ALT 21 0 - 70 U/L    Protein Total 7.1 6.8 - 8.8 g/dL    Albumin 3.4 3.4 - 5.0 g/dL    Bilirubin Total " 0.4 0.2 - 1.3 mg/dL    GFR Estimate 56 (L) >60 mL/min/1.73m2   CBC with platelets and differential     Status: Abnormal   Result Value Ref Range    WBC Count 3.8 (L) 4.0 - 11.0 10e3/uL    RBC Count 4.37 (L) 4.40 - 5.90 10e6/uL    Hemoglobin 13.8 13.3 - 17.7 g/dL    Hematocrit 42.8 40.0 - 53.0 %    MCV 98 78 - 100 fL    MCH 31.6 26.5 - 33.0 pg    MCHC 32.2 31.5 - 36.5 g/dL    RDW 13.5 10.0 - 15.0 %    Platelet Count 196 150 - 450 10e3/uL    % Neutrophils 45 %    % Lymphocytes 34 %    % Monocytes 13 %    % Eosinophils 8 %    % Basophils 1 %    Absolute Neutrophils 1.7 1.6 - 8.3 10e3/uL    Absolute Lymphocytes 1.3 0.8 - 5.3 10e3/uL    Absolute Monocytes 0.5 0.0 - 1.3 10e3/uL    Absolute Eosinophils 0.3 0.0 - 0.7 10e3/uL    Absolute Basophils 0.0 0.0 - 0.2 10e3/uL   CBC with platelets and differential     Status: Abnormal    Narrative    The following orders were created for panel order CBC with platelets and differential.  Procedure                               Abnormality         Status                     ---------                               -----------         ------                     CBC with platelets and d...[[id number]]  Abnormal            Final result                 Please view results for these tests on the individual orders.     Letter sent; My chart message sent.               Good results.      Your lab results are normal or stable,including the liver,kidney,glucose, and the bone marrow function.         Tracy Olguin is a 86 year old who presents for the following health issues     HPI     Chief Complaint   Patient presents with     RECHECK     f/u after using cream on foreskin, inflammation subsides, but always returns with the cream     Musculoskeletal Problem     ongoing hip pain     Imm/Inj     covid booster       Recurrent foreskin inflammation.     Using lotrimin.           Urine flow is good.                He lives in a senior apt; R lateral hip pain when he stands for too long.    He  "has been going to exercise classes there.        This pain \"limits his life style\".           This is described as ongoing R lateral hip pain, without radicular symptoms.            This dates back several years.  We reviewed his history with respect to his right lateral hip pain.      See patient instructions.                                    Used Dymista for 6 weeks; seemed to help.  Wants ears checked.          Review of Systems         Wt Readings from Last 4 Encounters:   11/15/21 75.6 kg (166 lb 9.6 oz)   08/11/21 71.2 kg (157 lb)   05/13/21 71.2 kg (157 lb)   01/20/21 72.5 kg (159 lb 12.8 oz)       Objective    /56   Pulse 70   Temp 97.7  F (36.5  C) (Temporal)   Resp 16   Ht 1.702 m (5' 7\")   Wt 75.6 kg (166 lb 9.6 oz)   SpO2 98%   BMI 26.09 kg/m    Body mass index is 26.09 kg/m .  Physical Exam   GENERAL APPEARANCE: alert and no distress  HENT: ear canals and TM's normal  NECK: no adenopathy, no asymmetry, masses, or scars and thyroid normal to palpation  RESP: no rales or rhonchi  CV: regular rates and rhythm, normal S1 S2, no S3 or S4 and no murmur, click or rub  ABDOMEN: soft, nontender, without hepatosplenomegaly or masses   (male): normal prostate  NEURO: Normal strength and tone and speech normal    Labs are pending            "

## 2021-11-17 ENCOUNTER — TELEPHONE (OUTPATIENT)
Dept: INTERNAL MEDICINE | Facility: CLINIC | Age: 86
End: 2021-11-17
Payer: COMMERCIAL

## 2021-11-17 NOTE — TELEPHONE ENCOUNTER
Pt called in requesting lab results from 11/15/21. Results given and advised that he should be receiving a letter in the mail.     Namrata Mason RN

## 2021-12-30 NOTE — PROGRESS NOTES
"  Assessment & Plan     Nonintractable episodic headache, unspecified headache type  Reviewed current treaments and other possibilities  He decided he is satisfied that he oesn't have a brain tumor of something  Recent scan was normal    Memory loss  Is in a supportive living situation  He feels like he is getting by ok    Chronic kidney disease, stage 3a (H)  Review labs, this is being followed annually    History of subarachnoid hemorrhage  Not on any sort of prevention, BP is normal    Greater trochanteric bursitis of right hip  Discussed planned othopedics appt  I don't recommend anything else other than what he has planned      Return in about 3 months (around 4/4/2022) for Physical Exam, Lab Work.    Rikki Mackenzie MD  [facility]      Tracy Olguin is a 86 year old who presents for the following health issues     PT WOULD LIKE TO  ESTABLISH CARE    History of Present Illness       Migraines:   Since the patient's last clinic visit, headaches are: no change  The patient is getting headaches:  Weekly  He is not able to do normal daily activities when he has a migraine.  The patient is taking the following rescue/relief medications:  No rescue/relief medications and other   Patient states \"I get some relief\" from the rescue/relief medications.   The patient is taking the following medications to prevent migraines:  No medications to prevent migraines  In the past 4 weeks, the patient has gone to an Urgent Care or Emergency Room 0 times times due to headaches.          Review of Systems   Constitutional, HEENT, cardiovascular, pulmonary, gi and gu systems are negative, except as otherwise noted.      Objective    /71   Pulse 68   Temp 98  F (36.7  C)   Ht 1.702 m (5' 7\")   Wt 77.6 kg (171 lb)   SpO2 97%   BMI 26.78 kg/m    Body mass index is 26.78 kg/m .  Physical Exam   GENERAL: healthy, alert and no distress  MS: no gross musculoskeletal defects noted, no " edema  Neuro: some perseveration    Reviewed records from previous PCP, internal Med  Reviewed labs in bmp  Also previous head imaging

## 2022-01-04 ENCOUNTER — OFFICE VISIT (OUTPATIENT)
Dept: FAMILY MEDICINE | Facility: CLINIC | Age: 87
End: 2022-01-04
Payer: COMMERCIAL

## 2022-01-04 VITALS
HEART RATE: 68 BPM | BODY MASS INDEX: 26.84 KG/M2 | WEIGHT: 171 LBS | TEMPERATURE: 98 F | HEIGHT: 67 IN | OXYGEN SATURATION: 97 % | SYSTOLIC BLOOD PRESSURE: 119 MMHG | DIASTOLIC BLOOD PRESSURE: 71 MMHG

## 2022-01-04 DIAGNOSIS — R41.3 MEMORY LOSS: ICD-10-CM

## 2022-01-04 DIAGNOSIS — R51.9 NONINTRACTABLE EPISODIC HEADACHE, UNSPECIFIED HEADACHE TYPE: Primary | ICD-10-CM

## 2022-01-04 DIAGNOSIS — Z86.79 HISTORY OF SUBARACHNOID HEMORRHAGE: ICD-10-CM

## 2022-01-04 DIAGNOSIS — M70.61 GREATER TROCHANTERIC BURSITIS OF RIGHT HIP: ICD-10-CM

## 2022-01-04 DIAGNOSIS — N18.31 CHRONIC KIDNEY DISEASE, STAGE 3A (H): ICD-10-CM

## 2022-01-04 PROCEDURE — 99204 OFFICE O/P NEW MOD 45 MIN: CPT | Performed by: FAMILY MEDICINE

## 2022-01-04 ASSESSMENT — MIFFLIN-ST. JEOR: SCORE: 1424.28

## 2022-01-06 ASSESSMENT — ASTHMA QUESTIONNAIRES
QUESTION_4 LAST FOUR WEEKS HOW OFTEN HAVE YOU USED YOUR RESCUE INHALER OR NEBULIZER MEDICATION (SUCH AS ALBUTEROL): NOT AT ALL
QUESTION_1 LAST FOUR WEEKS HOW MUCH OF THE TIME DID YOUR ASTHMA KEEP YOU FROM GETTING AS MUCH DONE AT WORK, SCHOOL OR AT HOME: NONE OF THE TIME
QUESTION_3 LAST FOUR WEEKS HOW OFTEN DID YOUR ASTHMA SYMPTOMS (WHEEZING, COUGHING, SHORTNESS OF BREATH, CHEST TIGHTNESS OR PAIN) WAKE YOU UP AT NIGHT OR EARLIER THAN USUAL IN THE MORNING: NOT AT ALL
QUESTION_2 LAST FOUR WEEKS HOW OFTEN HAVE YOU HAD SHORTNESS OF BREATH: ONCE OR TWICE A WEEK
QUESTION_5 LAST FOUR WEEKS HOW WOULD YOU RATE YOUR ASTHMA CONTROL: WELL CONTROLLED
ACT_TOTALSCORE: 23

## 2022-01-07 ASSESSMENT — ASTHMA QUESTIONNAIRES: ACT_TOTALSCORE: 23

## 2022-03-28 ENCOUNTER — TRANSFERRED RECORDS (OUTPATIENT)
Dept: HEALTH INFORMATION MANAGEMENT | Facility: CLINIC | Age: 87
End: 2022-03-28

## 2022-04-30 ENCOUNTER — HEALTH MAINTENANCE LETTER (OUTPATIENT)
Age: 87
End: 2022-04-30

## 2022-08-05 ENCOUNTER — TELEPHONE (OUTPATIENT)
Dept: FAMILY MEDICINE | Facility: CLINIC | Age: 87
End: 2022-08-05

## 2022-08-05 NOTE — TELEPHONE ENCOUNTER
JW,    Pt calling just transferred care to you, former [facility] pt.    Says he is taking Flonase and read that he is suppose to let doctor know if he has cataracts. He says he has had cataract but had it removed.    Can he still take the flonase.    Please advise.  Thanks,  Shanthi Boyd RN    removed

## 2022-08-05 NOTE — TELEPHONE ENCOUNTER
Contact of flonase/steroids to the eye can cause cataract.     However most  Providers (myself included) feel this  Risk is worth the benefit if  The flonase works well for allergies and also just be very careful to not get  It in the eye.     Joel Wegener,MD

## 2022-08-08 NOTE — PLAN OF CARE
DATE & TIME: 7/31/20 7-3pm  Lap Appendectomy yesterday  Cognitive Concerns/ Orientation : Alert and Oriented x4  BEHAVIOR & AGGRESSION TOOL COLOR:Green  CIWA SCORE: NA   ABNL VS/O2: VSS on RA, refused capno  MOBILITY: SBA, walked hallways X2, up in chair for most of shift.  PAIN MANAGMENT: oxycodone given X2 for abdominal pain  DIET: Clear Liq.   BOWEL/BLADDER: continent, hx of BPH. Not passing gas yet, voiding fine.   ABNL LAB/BG: Cr 1.28  WBC 14.1 this morning.  DRAIN/DEVICES: MAHESH to abdomen, serosanguinous drainage.   TELEMETRY RHYTHM: NA  SKIN: WNL, ex incision site and drain. Bandages CDI.   TESTS/PROCEDURES: none pending  D/C DAY/GOALS/PLACE: pending, 1-2 days  OTHER IMPORTANT INFO:   
DATE & TIME: 7/31/2020   Lap Appendectomy this afternoon  Cognitive Concerns/ Orientation : Alert and Oriented x4  BEHAVIOR & AGGRESSION TOOL COLOR:Green  CIWA SCORE: NA   ABNL VS/O2: VSS on RA, refused capno  MOBILITY: SBA   PAIN MANAGMENT: denies  DIET: Clear Liq.   BOWEL/BLADDER: continent, hx of BPH  ABNL LAB/BG: Cr 1.32, bili 1.4, WBC 14.2 this morning.  DRAIN/DEVICES: MAHESH to abdomen, serosanguinous drainage.   TELEMETRY RHYTHM: NA  SKIN: WNL, ex incision site and drain. Bandages CDI.   TESTS/PROCEDURES: none pending  D/C DAY/GOALS/PLACE: pending, not specified.  OTHER IMPORTANT INFO:         
DATE & TIME: 8/03/2020 2840-6764   Lap Appendectomy POD #4  Cognitive Concerns/ Orientation : A&O x4, calm and cooperative   BEHAVIOR & AGGRESSION TOOL COLOR:Green  CIWA SCORE: NA   ABNL VS/O2: VSS on RA  MOBILITY: Independent, ambulated today and up to chair  PAIN MANAGMENT: Denies pain but has abdominal discomfort and feels distended.  DIET: Tolerating low fiber diet, no nausea or increased pain  BOWEL/BLADDER: Continent. Abdomen rounded, pt feels distended. Given senna/miralax (senna now discontinued for stomach cramping) and a pink lady enema- very small hard BM. Flatus +  ABNL LAB/BG: WNL  DRAIN/DEVICES: MAHESH removed by surgery today. PIV SL  TELEMETRY RHYTHM: NA  SKIN: Abdominal incisions CDI, bruising to abdomen r/t heparin injections.  TESTS/PROCEDURES: NA  D/C DAY/GOALS/PLACE: Likely tomorrow per surgery if tolerating diet and BM.   OTHER IMPORTANT INFO:Continue IV zosyn until discharge- discharge medications already filled and in drawer.    
DATE & TIME: 8/03/2020 3661-2572  Lap Appendectomy POD #4  Cognitive Concerns/ Orientation : A&O x4, calm and cooperative   BEHAVIOR & AGGRESSION TOOL COLOR:Green  CIWA SCORE: NA   ABNL VS/O2: VSS on RA  MOBILITY: Independent, ambulated today x3 and up to chair  PAIN MANAGMENT: Denies pain but has abdominal discomfort and feels distended.  DIET: Tolerating low fiber diet, no nausea or increased pain  BOWEL/BLADDER: Continent. Abdomen tender. Given stool softeners in AM shift, miralax this evening. No BM yet, Flatus +  ABNL LAB/BG: WNL  DRAIN/DEVICES: PIV SL  TELEMETRY RHYTHM: NA  SKIN: Abdominal incisions CDI ex bruising, bruising to abdomen r/t heparin injections.  TESTS/PROCEDURES: NA  D/C DAY/GOALS/PLACE: Likely tomorrow per surgery if tolerating diet and BM.   OTHER IMPORTANT INFO:Continue IV zosyn until discharge- discharge medications already filled and in drawer.    
DATE & TIME: 8/03/2020 6035-8734   Lap Appendectomy POD #4  Cognitive Concerns/ Orientation : Alert and Oriented x4, calm and cooperative   BEHAVIOR & AGGRESSION TOOL COLOR:Green  CIWA SCORE: NA   ABNL VS/O2: VSS on RA  MOBILITY: Ind, encourage sitting up in chair for meals   PAIN MANAGMENT: Intermittent abdominal aches and soreness with movement, tender to touch. Denied ice and medication.   DIET: Starting low fiber diet this morning, previously full liquid.   BOWEL/BLADDER: Continent, hx of BPH. Passing flatus, last BM noted was about 6 days ago. Pt given medications for constipation yesterday, still no BM.  ABNL LAB/BG: WNL  DRAIN/DEVICES: MAHESH to abdomen, serosanguinous drainage/dressing CDI,  PIV SL in L hand   TELEMETRY RHYTHM: NA  SKIN: WNL, abdominal incisions CDI, bruising to abdomen r/t heparin injections.  TESTS/PROCEDURES: NA  D/C DAY/GOALS/PLACE: Possible today per surgery if tolerating diet.   OTHER IMPORTANT INFO:Encourage IS use, ice abdomen, and usage of PCDs.     
DATE & TIME: 8/3/20, 8416 - 1383   Cognitive Concerns/ Orientation : A&O x 4   BEHAVIOR & AGGRESSION TOOL COLOR: Green  CIWA SCORE: N/a   ABNL VS/O2: VSS on room air  MOBILITY: Independent  PAIN MANAGMENT: Reported abdominal discomfort rating 3/10. Declined analgesia  DIET: Low fiber  BOWEL/BLADDER: Continent  ABNL LAB/BG: N/a  DRAIN/DEVICES: PIV SL  TELEMETRY RHYTHM: N/a  SKIN: Abdominal incisions CDI. Bruising to abdomen due to Heparin injections  TESTS/PROCEDURES: N/a  D/C DAY/GOALS/PLACE: Likely discharge today if  Tolerating diet and BM  OTHER IMPORTANT INFO: Discharge medications in drawer      
DATE & TIME:8/1/20 1900-0730  Lap Appendectomy POD #3  Cognitive Concerns/ Orientation : Alert and Oriented x4  BEHAVIOR & AGGRESSION TOOL COLOR:Green  CIWA SCORE: NA   ABNL VS/O2: VSS on RA  MOBILITY: SBA, walks frequently  PAIN MANAGMENT: C/o mild abdominal cramping encouraged ambulation  DIET: Clear Liq. Tolerating.   BOWEL/BLADDER: continent, hx of BPH. Passed gas for the first time overnight, voiding fine.   ABNL LAB/BG:   DRAIN/DEVICES: MAHESH to abdomen, serosanguinous drainage/dressing CDI,  PIV infusing D5% with 20meqKCl at 100 ml/hr.   TELEMETRY RHYTHM: NA  SKIN: WNL, abdominal incision, very slight edema and pink where PIV infiltrated.   TESTS/PROCEDURES: none pending  D/C DAY/GOALS/PLACE: pending, 1-2 days  OTHER IMPORTANT INFO: Refused PCD. Ambulated x1 in hallway this shift.        
DATE & TIME:8/1/20 7-3pm  Lap Appendectomy POD #2  Cognitive Concerns/ Orientation : Alert and Oriented x4  BEHAVIOR & AGGRESSION TOOL COLOR:Green  CIWA SCORE: NA   ABNL VS/O2: VSS on RA  MOBILITY: SBA, walks frequently  PAIN MANAGMENT: Tylenol given x1 for abdominal pain  DIET: Clear Liq. Tolerating.   BOWEL/BLADDER: continent, hx of BPH. Not passing gas yet, voiding fine.   ABNL LAB/BG:   DRAIN/DEVICES: MAHESH to abdomen, serosanguinous drainage/dressing changed, incision C/D/I.  PIV infusing D5% with 20meqKCl at 100 ml/hr. IV infiltrated last PM. Spot marked/looks food.    TELEMETRY RHYTHM: NA  SKIN: WNL, abdominal incision, very slight edema and pink where PIV infiltrated.   TESTS/PROCEDURES: none pending  D/C DAY/GOALS/PLACE: pending, 1-2 days  OTHER IMPORTANT INFO: Refused PCD. Did not like the way he felt on oxycodone, tylenol seems to work. Ambulating X3 today. Up in chair for most of shift.  
DATE & TIME:8/2/20 7-3pm  Lap Appendectomy POD #3  Cognitive Concerns/ Orientation : Alert and Oriented x4  BEHAVIOR & AGGRESSION TOOL COLOR:Green  CIWA SCORE: NA   ABNL VS/O2: VSS on RA  MOBILITY: SBA, walks frequently  PAIN MANAGMENT: C/o mild abdominal cramping encouraged ambulation  DIET: Full Liq. Tolerating.   BOWEL/BLADDER: continent, hx of BPH. Passed gas for the first time overnight, couple times this shift (dulcalax supp/senna given this am) voiding fine.   ABNL LAB/BG: WNL  DRAIN/DEVICES: MAHESH to abdomen, serosanguinous drainage/dressing changed this am/CDI,  PIV SL  TELEMETRY RHYTHM: NA  SKIN: WNL, abdominal incision C/D/I, very slight edema and pink where PIV infiltrated to left lower arm.   TESTS/PROCEDURES: none pending  D/C DAY/GOALS/PLACE: Hopefully tomorrow if tolerating diet.   OTHER IMPORTANT INFO: Refused PCD. Ambulated x3, up in chair most of shift.  
Discharge    Patient discharged to home with wife  Care plan note:    Cognitive Concerns/ Orientation : A&O x4  BEHAVIOR & AGGRESSION TOOL COLOR: green   CIWA SCORE: n/a  ABNL VS/O2: VSS on RA  MOBILITY: Independent   PAIN MANAGMENT: c/o some mild abd cramping, resolved on its own, declined intervention.   DIET: low fiber, tolerating   BOWEL/BLADDER: continent, up to bathroom. Constipation, scheduled miralax given, soap suds enema given, able to have medium size BM; passing flatus.   ABNL LAB/BG: Creat 1.28  DRAIN/DEVICES: n/a  TELEMETRY RHYTHM: n/a  SKIN: abd incision sites, dressings CDI.   TESTS/PROCEDURES: POD 5 lap appy   D/C DAY/GOALS/PLACE: today, to home   OTHER IMPORTANT INFO: LS clear, denies SOB. Denies nausea, tolerating diet, some abd cramping at times.         Listed belongings gathered and returned to patient. Yes  Care Plan and Patient education resolved: Yes  Prescriptions if needed, hard copies sent with patient  Yes  Home and hospital acquired medications returned to patient: NA  Medication Bin checked and emptied on discharge Yes  Follow up appointment made for patient: Yes    
Lap Appendectomy POD #3  Cognitive Concerns/ Orientation : Alert and Oriented x4, calm and cooperative   BEHAVIOR & AGGRESSION TOOL COLOR:Green  CIWA SCORE: NA   ABNL VS/O2: VSS with BP in the 150's, on RA  MOBILITY: SBA, walks frequently, up in the chair for meals   PAIN MANAGMENT: C/o abdominal fullness after full liquid diet that resolved with heat packs and ambulation.   DIET: Full Liq. Tolerating. Low fiber to start tomorrow AM.   BOWEL/BLADDER: Continent, hx of BPH. Passed gas for the first time overnight, couple times this shift (dulcalax supp/senna given this am) voiding fine. No BM   ABNL LAB/BG: WNL  DRAIN/DEVICES: MAHESH to abdomen, serosanguinous drainage/dressing CDI,  PIV SL in L hand   TELEMETRY RHYTHM: NA  SKIN: WNL, Abdominal incision C/D/I, very slight edema and pink where PIV infiltrated to left lower arm.   TESTS/PROCEDURES: None pending  D/C DAY/GOALS/PLACE: Possible tomorrow per surgery if tolerating diet.   OTHER IMPORTANT INFO: Refused PCD. Ambulated x3, up in chair most of shift. Calls as needed.   
Lap Appendectomy this afternoon  Cognitive Concerns/ Orientation : AO4  BEHAVIOR & AGGRESSION TOOL COLOR: green  CIWA SCORE: n/a   ABNL VS/O2: VSS on r/a, refused capno  MOBILITY: up with 1  PAIN MANAGMENT: denies  DIET: CL  BOWEL/BLADDER: continent, hx of BPH  ABNL LAB/BG: Cr 1.32, bili 1.4, WBC 14.2 this morning.  DRAIN/DEVICES: MAHESH RLQ abd, serosanguinous  TELEMETRY RHYTHM: n/a  SKIN: WNL, ex incision site  TESTS/PROCEDURES: none pending  D/C DAY/GOALS/PLACE: pending, not specified.  OTHER IMPORTANT INFO:   Wife updated by PACU RN, per report, and writer.  She plans to visit in the AM and understands visiting hours of 8-6:30 and policy, 1 visitor per admission.  
Lap Appendectomy yesterday  Cognitive Concerns/ Orientation : Alert and Oriented x4  BEHAVIOR & AGGRESSION TOOL COLOR:Green  CIWA SCORE: NA   ABNL VS/O2: VSS on RA  MOBILITY: SBA, walks frequently  PAIN MANAGMENT: Tylenol given x2 for abdominal pain  DIET: Clear Liq. Tolerating.   BOWEL/BLADDER: continent, hx of BPH. Not passing gas yet, voiding fine.   ABNL LAB/BG:   DRAIN/DEVICES: MAHESH to abdomen, serosanguinous drainage. PIV infusing D5% with 20meqKCl at 100 ml/hr. IV infiltrated last PM. Spot marked/iced.   TELEMETRY RHYTHM: NA  SKIN: WNL, ex incision site and drain. Scant output around site, marked. Slight edema and pink where PIV infiltrated.   TESTS/PROCEDURES: none pending  D/C DAY/GOALS/PLACE: pending, 1-2 days  OTHER IMPORTANT INFO: Refused PCD. Did not like the way he felt on oxycodone, tylenol seems to work.   
100

## 2022-08-30 ENCOUNTER — VIRTUAL VISIT (OUTPATIENT)
Dept: FAMILY MEDICINE | Facility: CLINIC | Age: 87
End: 2022-08-30
Payer: COMMERCIAL

## 2022-08-30 DIAGNOSIS — N18.31 STAGE 3A CHRONIC KIDNEY DISEASE (H): ICD-10-CM

## 2022-08-30 DIAGNOSIS — U07.1 INFECTION DUE TO 2019 NOVEL CORONAVIRUS: Primary | ICD-10-CM

## 2022-08-30 PROCEDURE — 99213 OFFICE O/P EST LOW 20 MIN: CPT | Mod: CS | Performed by: INTERNAL MEDICINE

## 2022-08-30 NOTE — PATIENT INSTRUCTIONS
The FDA has authorized the emergency use of certain medications for patients who are at high risk for progression to severe illness or death from COVID 19. These medications are investigational, and therefore, information is limited as they are still being studied.        COVID-19 Outpatient Treatments    Important: You can NOT be prescribed Molnupiravir or PAXLOVID if you will start taking the medicine more than 5 days after your symptoms have started.    Molnupiravir: https://www.fda.gov/media/703877/download  PAXLOVID: https://www.fda.gov/media/470536/download  Sotrovimab: https://F[employer]reball/content/dam/global/hcpportal/en_[place]/Prescribing_Information/Sotrovimab/pdf/SOTROVIMAB-PATIENT-FACT-SHEET.PDF          Molnupiravir  How it works  Stops the virus from growing. Less amount of virus is easier for your body to fight.  Benefits  Lowers risk of hospitalization and death from COVID-19.  How to take  4 capsules by mouth every 12 hours (twice daily) for 5 days. Don't chew or break capsules. Swallow hole.  When to take  Take as soon as possible after positive COVID-19 test result, and within 5 days of your first symptoms.  Who can take it  Patients must be 18 years or older, weigh at least 88 pounds and have tested positive for COVID-19.  Possible side effects  Diarrhea (loose, watery stools), nausea (feeling sick to your stomach), dizziness, headaches.  Medication interactions  This medication is available under emergency use authorization (EUA), therefore drug interactions have not been studied extensively. There are currently no known conflicts with other drugs, but tell your care team all medicines you take.   Cautions  This is not recommended for patients who are pregnant.   Patients who could become pregnant should use reliable birth control until 4 days after their last dose.  Biological males who are sexually active with females should use reliable birth control for 3 months after their last  dose.          PAXLOVID (nimatrelvir/ritonavir)  How it works  Two medicines (nirmatrelvir and ritonavir) are taken together. They stop the virus from growing. Less amount of virus is easier for your body to fight.  Benefits  Lowers risk of hospitalization and death from COVID-19.  How to take  Medicine comes in a daily container with both medicine tablets. Take by mouth twice daily (once in the morning, once at night) for 5 days.  The number of tablets to take varies by patient.  Don't chew or break capsules. Swallow hole.  When to take  Take as soon as possible after positive COVID-19 test result, and within 5 days of your first symptoms.  Who can take it  Patients must be 18 years or older, weigh at least 88 pounds and have tested positive for COVID-19.  Possible side effects  Can cause altered sense of taste, diarhea (loose, watery stools), high blood pressure, muscle aches.  Medication interactions  Several medicines may interact with PAXLOVID and may cause serious side effects.  Tell your care team about all the medicines you take, including prescription and over-the-counter medicines, vitamins and herbal supplements.  Your provider will review your medicines to make sure that you can safely take PAXLOVID.  Cautions  PAXLOVID is not recommended for patients with severe kidney or liver disease. If you have mild kidney disease, the dose may need to be changed.  If you are pregnant or breastfeeding, talk to your care team about your options.  If you are sexually active, use effective birth control while taking PAXLOVID.    Stay well-hydrated  Take Tylenol as needed  Follow-up with your family care provider  Primary

## 2022-08-30 NOTE — PROGRESS NOTES
Richard is a 86 year old who is being evaluated via a billable telephone visit.      What phone number would you like to be contacted at? [phone]  How would you like to obtain your AVS? MyChart    Assessment & Plan      Sharif was seen today for covid 19 testing.    Diagnoses and all orders for this visit:    Infection due to 2019 novel coronavirus  -     nirmatrelvir and ritonavir (PAXLOVID) therapy pack; Take 2 tablets by mouth 2 times daily for 5 days  His symptoms a started on Sunday, August 28  Tested positive yesterday  He lives in senior living with his wife  He is having muscle aches, fever and chills  He has cough  Feels rundown  He is interested in antiviral treatment  Options discussed  Agreed to take Paxil weight    Stage 3a chronic kidney disease (H)  Due to this I lowered the dose to 2 tablet twice a day          MASSBP score:   Patient qualifies for COVID-19 treatment intervention.  Appropriate counseling was performed given EUA of drug and investigational phase/limited studies.   Full discussion with patient regarding medication options/risks/benefits/common side effects/potential drug interactions.  Discussed Paxlovid has significant risk for drug interactions. We reviewed all prescription/OTC/supplements patient is taking and patient was asked to HOLD the following:    Avoid alcohol while on paxlovid (and for three days after last dose) due to increased risk of liver inflammation/jaundice.  Patient confirms no known HIV diagnosis and understands Paxlovid may lead to complications if uncontrolled/undiagnosed HIV.    Counseling provided on contraceptive management:  Male on Molnupiravir-if sexually active with females of childbearing age, should use reliable method of contraception during treatment and for three months after last dose of molnupiravir  Female on Molnupiravir: if childbearing age, should use reliable contraception during treatment and for 4 days after last dose of  "molnupiravir  Paxlovid may reduce the efficacy of combined hormonal contraceptives and women should use an additional contraceptive method while on medication and for three days after last dose.  Please call the pharmacy prior to getting there to ensure they have your medication in stock and so they can review the medication in more detail with you.   Call if any questions/concerns.   If worsening symptoms including but not limited to persistent shortness of breath or chest pain, patient instructed to go to emergency room.        BMI:   Estimated body mass index is 26.78 kg/m  as calculated from the following:    Height as of 1/4/22: 1.702 m (5' 7\").    Weight as of 1/4/22: 77.6 kg (171 lb).       See Patient Instructions  Patient Instructions   The FDA has authorized the emergency use of certain medications for patients who are at high risk for progression to severe illness or death from COVID 19. These medications are investigational, and therefore, information is limited as they are still being studied.        COVID-19 Outpatient Treatments     Important: You can NOT be prescribed Molnupiravir or PAXLOVID if you will start taking the medicine more than 5 days after your symptoms have started.    Molnupiravir: https://www.fda.gov/media/882808/download  PAXLOVID: https://www.fda.gov/media/814305/download  Sotrovimab: https://Mixwit/content/dam/global/hcpportal/en_US/Prescribing_Information/Sotrovimab/pdf/SOTROVIMAB-PATIENT-FACT-SHEET.PDF          Molnupiravir  How it works  Stops the virus from growing. Less amount of virus is easier for your body to fight.  Benefits  Lowers risk of hospitalization and death from COVID-19.  How to take  4 capsules by mouth every 12 hours (twice daily) for 5 days. Don't chew or break capsules. Swallow hole.  When to take  Take as soon as possible after positive COVID-19 test result, and within 5 days of your first symptoms.  Who can take it  Patients must be 18 years or older, " weigh at least 88 pounds and have tested positive for COVID-19.  Possible side effects  Diarrhea (loose, watery stools), nausea (feeling sick to your stomach), dizziness, headaches.  Medication interactions  This medication is available under emergency use authorization (EUA), therefore drug interactions have not been studied extensively. There are currently no known conflicts with other drugs, but tell your care team all medicines you take.   Cautions   This is not recommended for patients who are pregnant.    Patients who could become pregnant should use reliable birth control until 4 days after their last dose.   Biological males who are sexually active with females should use reliable birth control for 3 months after their last dose.          PAXLOVID (nimatrelvir/ritonavir)  How it works  Two medicines (nirmatrelvir and ritonavir) are taken together. They stop the virus from growing. Less amount of virus is easier for your body to fight.  Benefits  Lowers risk of hospitalization and death from COVID-19.  How to take   Medicine comes in a daily container with both medicine tablets. Take by mouth twice daily (once in the morning, once at night) for 5 days.   The number of tablets to take varies by patient.   Don't chew or break capsules. Swallow hole.  When to take  Take as soon as possible after positive COVID-19 test result, and within 5 days of your first symptoms.  Who can take it  Patients must be 18 years or older, weigh at least 88 pounds and have tested positive for COVID-19.  Possible side effects  Can cause altered sense of taste, diarhea (loose, watery stools), high blood pressure, muscle aches.  Medication interactions   Several medicines may interact with PAXLOVID and may cause serious side effects.   Tell your care team about all the medicines you take, including prescription and over-the-counter medicines, vitamins and herbal supplements.   Your provider will review your medicines to make sure that  you can safely take PAXLOVID.  Cautions   PAXLOVID is not recommended for patients with severe kidney or liver disease. If you have mild kidney disease, the dose may need to be changed.   If you are pregnant or breastfeeding, talk to your care team about your options.   If you are sexually active, use effective birth control while taking PAXLOVID.    Stay well-hydrated  Take Tylenol as needed  Follow-up with your family care provider  Primary          Return in about 2 weeks (around 9/13/2022) for wellness visit.    Noelle Shah MD  [facility] ODESSA Ramos is a 86 year old, presenting for the following health issues:  Covid 19 Testing (Positive test referral )    For ACT, patient states he doesn't have asthma but did as a child.  ACT was not done.    HPI       COVID-19 Symptom Review  How many days ago did these symptoms start? Sunday  Tested positive yesterday    Are any of the following symptoms significant for you?    New or worsening difficulty breathing? No    Worsening cough? Yes, I am coughing up mucus.    Fever or chills? Yes, chills    Headache: YES    Sore throat: YES    Chest pain: No    Diarrhea: No    Body aches? YES    What treatments has patient tried? robitussin   Does patient live in a nursing home, group home, or shelter? No  Does patient have a way to get food/medications during quarantined? Yes, I have a friend or family member who can help me.          Patient is in senior living  [facility] [employer] [facility]  He got sick on Sunday  He feels tired fatigued and rundown  He has a cough  He lives with his wife  Wife had COVID 6 months ago  He tested positive at [facility]  Test was done yesterday  He is interested in antiviral treatment        Review of Systems   Constitutional, HEENT, cardiovascular, pulmonary, GI, , musculoskeletal, neuro, skin, endocrine and psych systems are negative, except as otherwise noted.      Objective           Vitals:  No  vitals were obtained today due to virtual visit.    Physical Exam   healthy, alert and no distress  PSYCH: Alert and oriented times 3; coherent speech, normal   rate and volume, able to articulate logical thoughts, able   to abstract reason, no tangential thoughts, no hallucinations   or delusions  His affect is normal  RESP: No cough, no audible wheezing, able to talk in full sentences  Remainder of exam unable to be completed due to telephone visits      Disclaimer: This note consists of symbols derived from keyboarding, dictation and/or voice recognition software. As a result, there may be errors in the script that have gone undetected. Please consider this when interpreting information found in this chart.            Phone call duration: 10 minutes    .  ..

## 2022-08-31 ENCOUNTER — NURSE TRIAGE (OUTPATIENT)
Dept: FAMILY MEDICINE | Facility: CLINIC | Age: 87
End: 2022-08-31

## 2022-08-31 DIAGNOSIS — J02.8 SORE THROAT (VIRAL): Primary | ICD-10-CM

## 2022-08-31 DIAGNOSIS — B97.89 SORE THROAT (VIRAL): Primary | ICD-10-CM

## 2022-08-31 RX ORDER — PREDNISONE 20 MG/1
40 TABLET ORAL DAILY
Qty: 10 TABLET | Refills: 0 | Status: SHIPPED | OUTPATIENT
Start: 2022-08-31 | End: 2022-09-05

## 2022-08-31 NOTE — TELEPHONE ENCOUNTER
"Pt started paxlovid yesterday. Since taking he feels he is having more difficulty swallowing, mostly due to pain and he is not sure if it is because of swelling. He can swallow/drink water normally. Not worsening. Did not have this bad of sore throat before starting the paxlovid.  No facial or tongue swelling. No rash or vomiting. Does not feel faint or weak (beyond covid sx)    Covid sx are about the same since starting: tired, achey, headache, cough. NO SOB or chest pain, no difficulty breathing. Has taken tylenol x1 and this helped some.     Wondering if PCP advises to continue med or if he should stop?    Adia ESTRADA RN      Answer Assessment - Initial Assessment Questions  1. NAME of MEDICATION: \"What medicine are you calling about?\"      Paxlovid  2. QUESTION: \"What is your question?\" (e.g., double dose of medicine, side effect)      Sore throat/trouble swallowing  3. PRESCRIBING HCP: \"Who prescribed it?\" Reason: if prescribed by specialist, call should be referred to that group.      covid tx provider  4. SYMPTOMS: \"Do you have any symptoms?\"      Sore throat/trouble swallowing  5. SEVERITY: If symptoms are present, ask \"Are they mild, moderate or severe?\"      Mild/mod  6. PREGNANCY:  \"Is there any chance that you are pregnant?\" \"When was your last menstrual period?\"      no    Protocols used: MEDICATION QUESTION CALL-A-OH        "

## 2022-08-31 NOTE — TELEPHONE ENCOUNTER
Provider Response to 2nd Level Triage Request    I have reviewed the RN documentation. My recommendation is:  It is overwhelmingly likely the sore throat/throat symptoms are due to covid and not the medication.  Thus I would continue the paxlovid.  If develops stridor/wheezing/lip swelling/tongue swelling I would seek emergency care of course.     Given the severity of sore throat I would also recommend treatment course of prednisone (since low gfr and want to avoid ibuprofen).     If in rare case this is allergic reaction prednisone would also treat this even while continuing the paxlovid.     Joel Wegener,MD

## 2022-09-08 ENCOUNTER — OFFICE VISIT (OUTPATIENT)
Dept: FAMILY MEDICINE | Facility: CLINIC | Age: 87
End: 2022-09-08
Payer: COMMERCIAL

## 2022-09-08 VITALS
BODY MASS INDEX: 27.86 KG/M2 | OXYGEN SATURATION: 97 % | WEIGHT: 167.2 LBS | HEIGHT: 65 IN | SYSTOLIC BLOOD PRESSURE: 104 MMHG | HEART RATE: 66 BPM | TEMPERATURE: 97.5 F | DIASTOLIC BLOOD PRESSURE: 59 MMHG

## 2022-09-08 DIAGNOSIS — E78.5 HYPERLIPIDEMIA, UNSPECIFIED HYPERLIPIDEMIA TYPE: ICD-10-CM

## 2022-09-08 DIAGNOSIS — Z00.00 ENCOUNTER FOR MEDICARE ANNUAL WELLNESS EXAM: Primary | ICD-10-CM

## 2022-09-08 DIAGNOSIS — Z12.5 SCREENING FOR PROSTATE CANCER: ICD-10-CM

## 2022-09-08 DIAGNOSIS — R41.3 MEMORY LOSS: ICD-10-CM

## 2022-09-08 DIAGNOSIS — M62.81 GENERALIZED MUSCLE WEAKNESS: ICD-10-CM

## 2022-09-08 DIAGNOSIS — Z79.899 MEDICATION MANAGEMENT: ICD-10-CM

## 2022-09-08 LAB
ERYTHROCYTE [DISTWIDTH] IN BLOOD BY AUTOMATED COUNT: 12.9 % (ref 10–15)
HCT VFR BLD AUTO: 40.8 % (ref 40–53)
HGB BLD-MCNC: 13.6 G/DL (ref 13.3–17.7)
MCH RBC QN AUTO: 31.8 PG (ref 26.5–33)
MCHC RBC AUTO-ENTMCNC: 33.3 G/DL (ref 31.5–36.5)
MCV RBC AUTO: 95 FL (ref 78–100)
PLATELET # BLD AUTO: 241 10E3/UL (ref 150–450)
RBC # BLD AUTO: 4.28 10E6/UL (ref 4.4–5.9)
WBC # BLD AUTO: 4.6 10E3/UL (ref 4–11)

## 2022-09-08 PROCEDURE — 80061 LIPID PANEL: CPT | Performed by: FAMILY MEDICINE

## 2022-09-08 PROCEDURE — 80053 COMPREHEN METABOLIC PANEL: CPT | Performed by: FAMILY MEDICINE

## 2022-09-08 PROCEDURE — 85027 COMPLETE CBC AUTOMATED: CPT | Performed by: FAMILY MEDICINE

## 2022-09-08 PROCEDURE — G0439 PPPS, SUBSEQ VISIT: HCPCS | Performed by: FAMILY MEDICINE

## 2022-09-08 PROCEDURE — 36415 COLL VENOUS BLD VENIPUNCTURE: CPT | Performed by: FAMILY MEDICINE

## 2022-09-08 PROCEDURE — G0103 PSA SCREENING: HCPCS | Performed by: FAMILY MEDICINE

## 2022-09-08 RX ORDER — ACETAMINOPHEN 500 MG
500-1000 TABLET ORAL EVERY 6 HOURS PRN
COMMUNITY

## 2022-09-08 RX ORDER — FLUTICASONE PROPIONATE 50 MCG
1 SPRAY, SUSPENSION (ML) NASAL DAILY
COMMUNITY
End: 2024-01-25

## 2022-09-08 RX ORDER — LORATADINE 10 MG/1
10 TABLET ORAL DAILY
COMMUNITY
End: 2024-01-25

## 2022-09-08 NOTE — PATIENT INSTRUCTIONS
Plan flu shot and covid booster from pharmacy in October.     If needs hip bursa injection at some point will follow up with me.     Mr. Kumar will have [facility] send me orders for memory testing.       Use the flonase nasal spray regularly to better treat allergies and sinus headache.     Labs today.      Follow up one year or earlier as needed.

## 2022-09-08 NOTE — PROGRESS NOTES
SUBJECTIVE:   Sharif Kumar is a 87 year old male who presents for Preventive Visit.      Patient has been advised of split billing requirements and indicates understanding: Yes  Are you in the first 12 months of your Medicare coverage?  No    HPI  Do you feel safe in your environment? Yes    Have you ever done Advance Care Planning? (For example, a Health Directive, POLST, or a discussion with a medical provider or your loved ones about your wishes): Yes, advance care planning is on file.       Fall risk   No    Cognitive Screening   1) Repeat 3 items (Leader, Season, Table)    2) Clock draw: NORMAL  3) 3 item recall: Recalls 3 objects  Results: 3 items recalled: COGNITIVE IMPAIRMENT LESS LIKELY    Mini-CogTM Copyright S Darlene. Licensed by the author for use in [employer]; reprinted with permission (elva@[facility]). All rights reserved.      Do you have sleep apnea, excessive snoring or daytime drowsiness?: no    Reviewed and updated as needed this visit by clinical staff   Tobacco  Allergies  Meds                Reviewed and updated as needed this visit by Provider                   Social History     Tobacco Use     Smoking status: Never Smoker     Smokeless tobacco: Never Used     Tobacco comment: occasional cigar maybe twice a year   Substance Use Topics     Alcohol use: Yes     Comment: OCCASIONAL WINE         Alcohol Use 11/12/2018   Prescreen: >3 drinks/day or >7 drinks/week? No   Prescreen: >3 drinks/day or >7 drinks/week? -           PROBLEMS TO ADD ON...  -------------------------------------  [facility] for general strengthening/fall prevention. Some memory decreasing, he feels normal for age although spouse/family interested in a more formal assessment that can be offered at his assisted living. Reassured passed our memory test today.     Feels has nasal allergies , uses over the counter claritin but not effective.       Current providers sharing in care for this patient  "include:   Patient Care Team:  Wegener, Joel Daniel Irwin, MD as PCP - General (Family Medicine)  Rikki Mackenzie MD as Assigned PCP    The following health maintenance items are reviewed in Epic and correct as of today:  Health Maintenance Due   Topic Date Due     MICROALBUMIN  Never done     URINE DRUG SCREEN  Never done     ANNUAL REVIEW OF  ORDERS  Never done     ASTHMA ACTION PLAN  11/04/2018     COVID-19 Vaccine (4 - Booster for Pfizer series) 01/10/2022     ASTHMA CONTROL TEST  07/04/2022     INFLUENZA VACCINE (1) 09/01/2022               Review of Systems  Constitutional, HEENT, cardiovascular, pulmonary, GI, , musculoskeletal, neuro, skin, endocrine and psych systems are negative, except as otherwise noted.    OBJECTIVE:   /59   Pulse 66   Temp 97.5  F (36.4  C) (Temporal)   Ht 1.638 m (5' 4.5\")   Wt 75.8 kg (167 lb 3.2 oz)   SpO2 97%   BMI 28.26 kg/m   Estimated body mass index is 28.26 kg/m  as calculated from the following:    Height as of this encounter: 1.638 m (5' 4.5\").    Weight as of this encounter: 75.8 kg (167 lb 3.2 oz).  Physical Exam  GENERAL: healthy, alert and no distress  EYES: Eyes grossly normal to inspection, PERRL and conjunctivae and sclerae normal  NECK: no adenopathy, no asymmetry, masses, or scars and thyroid normal to palpation  RESP: lungs clear to auscultation - no rales, rhonchi or wheezes  CV: regular rate and rhythm, normal S1 S2, no S3 or S4, no murmur, click or rub, no peripheral edema and peripheral pulses strong  ABDOMEN: soft, nontender, no hepatosplenomegaly, no masses and bowel sounds normal  MS: no gross musculoskeletal defects noted, no edema  SKIN: no suspicious lesions or rashes  NEURO: Normal strength and tone, mentation intact and speech normal  PSYCH: mentation appears normal, affect normal/bright        ASSESSMENT / PLAN:   (Z00.00) Encounter for Medicare annual wellness exam  (primary encounter diagnosis)  Plan flu shot and covid " "booster from pharmacy in October.     If needs hip bursa injection at some point will follow up with me.     [facility] will [facility] me orders for memory testing.       Use the flonase nasal spray regularly to better treat allergies and sinus headache.     Labs today.      Follow up one year or earlier as needed.     (E78.5) Hyperlipidemia, unspecified hyperlipidemia type  Comment:   Plan: Lipid panel reflex to direct LDL Fasting            (Z79.899) Medication management  Comment:   Plan: CBC with platelets, Comprehensive metabolic         panel            (Z12.5) Screening for prostate cancer  Comment:   Plan: CANCELED: Prostate Specific Antigen Screen              Patient has been advised of split billing requirements and indicates understanding: Yes    COUNSELING:  Reviewed preventive health counseling, as reflected in patient instructions       Regular exercise       Healthy diet/nutrition       Fall risk prevention       Colon cancer screening       Prostate cancer screening    Estimated body mass index is 28.26 kg/m  as calculated from the following:    Height as of this encounter: 1.638 m (5' 4.5\").    Weight as of this encounter: 75.8 kg (167 lb 3.2 oz).        He reports that he has never smoked. He has never used smokeless tobacco.      Appropriate preventive services were discussed with this patient, including applicable screening as appropriate for cardiovascular disease, diabetes, osteopenia/osteoporosis, and glaucoma.  As appropriate for age/gender, discussed screening for colorectal cancer, prostate cancer, breast cancer, and cervical cancer. Checklist reviewing preventive services available has been given to the patient.    Reviewed patients plan of care and provided an AVS. The Basic Care Plan (routine screening as documented in Health Maintenance) for Sharif meets the Care Plan requirement. This Care Plan has been established and reviewed with the Patient.    Counseling " Resources:  ATP IV Guidelines  Pooled Cohorts Equation Calculator  Breast Cancer Risk Calculator  Breast Cancer: Medication to Reduce Risk  FRAX Risk Assessment  ICSI Preventive Guidelines  Dietary Guidelines for Americans, 2010  USDA's MyPlate  ASA Prophylaxis  Lung CA Screening    Joel Daniel Wegener, MD  [facility]    Identified Health Risks:

## 2022-09-09 LAB
ALBUMIN SERPL-MCNC: 3.5 G/DL (ref 3.4–5)
ALP SERPL-CCNC: 79 U/L (ref 40–150)
ALT SERPL W P-5'-P-CCNC: 42 U/L (ref 0–70)
ANION GAP SERPL CALCULATED.3IONS-SCNC: 5 MMOL/L (ref 3–14)
AST SERPL W P-5'-P-CCNC: 20 U/L (ref 0–45)
BILIRUB SERPL-MCNC: 0.2 MG/DL (ref 0.2–1.3)
BUN SERPL-MCNC: 19 MG/DL (ref 7–30)
CALCIUM SERPL-MCNC: 8.9 MG/DL (ref 8.5–10.1)
CHLORIDE BLD-SCNC: 107 MMOL/L (ref 94–109)
CHOLEST SERPL-MCNC: 169 MG/DL
CO2 SERPL-SCNC: 27 MMOL/L (ref 20–32)
CREAT SERPL-MCNC: 1.21 MG/DL (ref 0.66–1.25)
FASTING STATUS PATIENT QL REPORTED: NO
GFR SERPL CREATININE-BSD FRML MDRD: 59 ML/MIN/1.73M2
GLUCOSE BLD-MCNC: 78 MG/DL (ref 70–99)
HDLC SERPL-MCNC: 51 MG/DL
LDLC SERPL CALC-MCNC: 90 MG/DL
NONHDLC SERPL-MCNC: 118 MG/DL
POTASSIUM BLD-SCNC: 4.7 MMOL/L (ref 3.4–5.3)
PROT SERPL-MCNC: 7.3 G/DL (ref 6.8–8.8)
PSA SERPL-MCNC: 22.7 UG/L (ref 0–4)
SODIUM SERPL-SCNC: 139 MMOL/L (ref 133–144)
TRIGL SERPL-MCNC: 139 MG/DL

## 2022-10-19 ENCOUNTER — TELEPHONE (OUTPATIENT)
Dept: FAMILY MEDICINE | Facility: CLINIC | Age: 87
End: 2022-10-19

## 2022-10-19 NOTE — TELEPHONE ENCOUNTER
Forms/Letter Request    Type of form/letter:   OP ST to eval and treat  Related diagnosis code    Have you been seen for this request:   unknown    Do we have the form/letter:   Faxed to the [facility]    When is form/letter needed by: n/a    How would you like the form/letter returned:   Fax: [phone]    Patient Notified form requests are processed in 3-5 business days:No    Could we send this information to you [facility] or would you prefer to receive a phone call?:   n/a

## 2022-10-20 ENCOUNTER — MEDICAL CORRESPONDENCE (OUTPATIENT)
Dept: FAMILY MEDICINE | Facility: CLINIC | Age: 87
End: 2022-10-20

## 2022-10-20 NOTE — TELEPHONE ENCOUNTER
Completed signed and Faxed. Sent for scanning  Antionette Jackson [facility] Unit Coordinator

## 2022-10-26 ENCOUNTER — APPOINTMENT (OUTPATIENT)
Dept: URBAN - METROPOLITAN AREA CLINIC 256 | Age: 87
Setting detail: DERMATOLOGY
End: 2022-10-26

## 2022-10-26 VITALS — HEIGHT: 67 IN | WEIGHT: 160 LBS

## 2022-10-26 DIAGNOSIS — L57.0 ACTINIC KERATOSIS: ICD-10-CM

## 2022-10-26 DIAGNOSIS — D485 NEOPLASM OF UNCERTAIN BEHAVIOR OF SKIN: ICD-10-CM

## 2022-10-26 DIAGNOSIS — L82.1 OTHER SEBORRHEIC KERATOSIS: ICD-10-CM

## 2022-10-26 DIAGNOSIS — B37.2 CANDIDIASIS OF SKIN AND NAIL: ICD-10-CM

## 2022-10-26 DIAGNOSIS — L81.4 OTHER MELANIN HYPERPIGMENTATION: ICD-10-CM

## 2022-10-26 DIAGNOSIS — Z71.89 OTHER SPECIFIED COUNSELING: ICD-10-CM

## 2022-10-26 DIAGNOSIS — D22 MELANOCYTIC NEVI: ICD-10-CM

## 2022-10-26 DIAGNOSIS — D18.0 HEMANGIOMA: ICD-10-CM

## 2022-10-26 DIAGNOSIS — Z85.828 PERSONAL HISTORY OF OTHER MALIGNANT NEOPLASM OF SKIN: ICD-10-CM

## 2022-10-26 PROBLEM — D22.5 MELANOCYTIC NEVI OF TRUNK: Status: ACTIVE | Noted: 2022-10-26

## 2022-10-26 PROBLEM — D18.01 HEMANGIOMA OF SKIN AND SUBCUTANEOUS TISSUE: Status: ACTIVE | Noted: 2022-10-26

## 2022-10-26 PROBLEM — D48.5 NEOPLASM OF UNCERTAIN BEHAVIOR OF SKIN: Status: ACTIVE | Noted: 2022-10-26

## 2022-10-26 PROCEDURE — 17003 DESTRUCT PREMALG LES 2-14: CPT

## 2022-10-26 PROCEDURE — OTHER LIQUID NITROGEN: OTHER

## 2022-10-26 PROCEDURE — 17000 DESTRUCT PREMALG LESION: CPT | Mod: 59

## 2022-10-26 PROCEDURE — 99214 OFFICE O/P EST MOD 30 MIN: CPT | Mod: 25

## 2022-10-26 PROCEDURE — OTHER MIPS QUALITY: OTHER

## 2022-10-26 PROCEDURE — OTHER BIOPSY BY SHAVE METHOD: OTHER

## 2022-10-26 PROCEDURE — 11102 TANGNTL BX SKIN SINGLE LES: CPT

## 2022-10-26 PROCEDURE — OTHER PRESCRIPTION: OTHER

## 2022-10-26 PROCEDURE — OTHER COUNSELING: OTHER

## 2022-10-26 RX ORDER — KETOCONAZOLE 20 MG/G
CREAM TOPICAL
Qty: 60 | Refills: 1 | Status: ERX | COMMUNITY
Start: 2022-10-26

## 2022-10-26 ASSESSMENT — LOCATION DETAILED DESCRIPTION DERM
LOCATION DETAILED: RIGHT INGUINAL CREASE
LOCATION DETAILED: RIGHT INFERIOR LATERAL FOREHEAD
LOCATION DETAILED: LEFT SUPERIOR MEDIAL FOREHEAD
LOCATION DETAILED: LEFT CENTRAL MALAR CHEEK
LOCATION DETAILED: RIGHT MID-UPPER BACK
LOCATION DETAILED: LEFT SUPERIOR FOREHEAD
LOCATION DETAILED: RIGHT INFERIOR UPPER BACK
LOCATION DETAILED: LEFT LATERAL INFERIOR CHEST
LOCATION DETAILED: RIGHT SUPERIOR TEMPLE
LOCATION DETAILED: LEFT SUPERIOR FOREHEAD
LOCATION DETAILED: RIGHT INFERIOR LATERAL MALAR CHEEK

## 2022-10-26 ASSESSMENT — LOCATION SIMPLE DESCRIPTION DERM
LOCATION SIMPLE: RIGHT TEMPLE
LOCATION SIMPLE: RIGHT UPPER BACK
LOCATION SIMPLE: RIGHT CHEEK
LOCATION SIMPLE: RIGHT INGUINAL CREASE
LOCATION SIMPLE: RIGHT FOREHEAD
LOCATION SIMPLE: LEFT FOREHEAD
LOCATION SIMPLE: LEFT FOREHEAD
LOCATION SIMPLE: CHEST
LOCATION SIMPLE: LEFT CHEEK

## 2022-10-26 ASSESSMENT — LOCATION ZONE DERM
LOCATION ZONE: FACE
LOCATION ZONE: FACE
LOCATION ZONE: TRUNK

## 2022-10-26 NOTE — HPI: FULL BODY SKIN EXAMINATION
What Is The Reason For Today's Visit?: Full Body Skin Examination
What Is The Reason For Today's Visit? (Being Monitored For X): concerning skin lesions on an annual basis
Residential stability/Sobriety

## 2022-10-26 NOTE — PROCEDURE: BIOPSY BY SHAVE METHOD
Validate That Anesthesia Is Not 0: No
Was A Bandage Applied: Yes
Wound Care: Petrolatum
Billing Type: Third-Party Bill
X Size Of Lesion In Cm: 0
Cryotherapy Text: The wound bed was treated with cryotherapy after the biopsy was performed.
Notification Instructions: Patient will be notified of biopsy results. However, patient instructed to call the office if not contacted within 2 weeks.
Silver Nitrate Text: The wound bed was treated with silver nitrate after the biopsy was performed.
Depth Of Biopsy: dermis
Anesthesia Type: 1% lidocaine with epinephrine
Electrodesiccation And Curettage Text: The wound bed was treated with electrodesiccation and curettage after the biopsy was performed.
Biopsy Method: Dermablade
Anesthesia Volume In Cc (Will Not Render If 0): 0.2
Information: Selecting Yes will display possible errors in your note based on the variables you have selected. This validation is only offered as a suggestion for you. PLEASE NOTE THAT THE VALIDATION TEXT WILL BE REMOVED WHEN YOU FINALIZE YOUR NOTE. IF YOU WANT TO FAX A PRELIMINARY NOTE YOU WILL NEED TO TOGGLE THIS TO 'NO' IF YOU DO NOT WANT IT IN YOUR FAXED NOTE.
Electrodesiccation Text: The wound bed was treated with electrodesiccation after the biopsy was performed.
Type Of Destruction Used: Curettage
Detail Level: Detailed
Consent: Written consent was obtained and risks were reviewed including but not limited to scarring, infection, bleeding, scabbing, incomplete removal, nerve damage and allergy to anesthesia.
Curettage Text: The wound bed was treated with curettage after the biopsy was performed.
Biopsy Type: H and E
Post-Care Instructions: I reviewed with the patient in detail post-care instructions. Patient is to keep the biopsy site dry overnight, and then apply bacitracin twice daily until healed. Patient may apply hydrogen peroxide soaks to remove any crusting.
Anticipated Plan (Based On Presumed Biopsy Results): EDC
Dressing: bandage
Hemostasis: Drysol and Electrocautery

## 2022-10-26 NOTE — HPI: RASH
Is This A New Presentation, Or A Follow-Up?: Rash
Additional History: Patient reports having ‘jock itch.’

## 2022-10-26 NOTE — PROCEDURE: LIQUID NITROGEN
Show Aperture Variable?: Yes
Detail Level: Detailed
Render Post-Care Instructions In Note?: no
Post-Care Instructions: I reviewed with the patient in detail post-care instructions. Patient is to wear sunprotection, and avoid picking at any of the treated lesions. Pt may apply Vaseline to crusted or scabbing areas.
Consent: The patient's consent was obtained including but not limited to risks of crusting, scabbing, blistering, scarring, darker or lighter pigmentary change, recurrence, incomplete removal and infection.
Duration Of Freeze Thaw-Cycle (Seconds): 0

## 2022-11-19 ENCOUNTER — HEALTH MAINTENANCE LETTER (OUTPATIENT)
Age: 87
End: 2022-11-19

## 2022-11-22 ENCOUNTER — TELEPHONE (OUTPATIENT)
Dept: FAMILY MEDICINE | Facility: CLINIC | Age: 87
End: 2022-11-22

## 2022-11-22 NOTE — TELEPHONE ENCOUNTER
Forms/Letter Request    Type of form/letter:   [facility]  Speech Therapy SLP Evaluation and plan of treatment  Start of care: 11/15/2022    Have you been seen for this request: N/A    Do we have the form/letter:   Faxed to the [facility]    When is form/letter needed by: n/a    How would you like the form/letter returned:   Fax: [phone]    Patient Notified form requests are processed in 3-5 business days:No    Could we send this information call?:  N/a    Placed on Dr. Wegener's desk.

## 2022-11-30 ENCOUNTER — APPOINTMENT (OUTPATIENT)
Dept: URBAN - METROPOLITAN AREA CLINIC 256 | Age: 87
Setting detail: DERMATOLOGY
End: 2022-11-30

## 2022-11-30 VITALS — HEIGHT: 60 IN | WEIGHT: 160 LBS

## 2022-11-30 DIAGNOSIS — L739 UNSPECIFIED DISEASE OF SEBACEOUS GLANDS: ICD-10-CM

## 2022-11-30 PROBLEM — D23.5 OTHER BENIGN NEOPLASM OF SKIN OF TRUNK: Status: ACTIVE | Noted: 2022-11-30

## 2022-11-30 PROCEDURE — 99212 OFFICE O/P EST SF 10 MIN: CPT

## 2022-11-30 PROCEDURE — OTHER MIPS QUALITY: OTHER

## 2022-11-30 PROCEDURE — OTHER COUNSELING: OTHER

## 2022-11-30 ASSESSMENT — LOCATION SIMPLE DESCRIPTION DERM: LOCATION SIMPLE: CHEST

## 2022-11-30 ASSESSMENT — LOCATION DETAILED DESCRIPTION DERM: LOCATION DETAILED: LEFT LATERAL INFERIOR CHEST

## 2022-11-30 ASSESSMENT — LOCATION ZONE DERM: LOCATION ZONE: TRUNK

## 2022-11-30 NOTE — PROCEDURE: COUNSELING
Detail Level: Detailed
Patient Specific Counseling (Will Not Stick From Patient To Patient): We will opt to watch the lesion now as it appears to have been removed with the biopsy alone. Since he has no other similar lesions we are not worrying about Alvin Isrrael syndrome

## 2022-12-07 ENCOUNTER — TELEPHONE (OUTPATIENT)
Dept: UROLOGY | Facility: CLINIC | Age: 87
End: 2022-12-07

## 2022-12-07 DIAGNOSIS — R97.20 ELEVATED PROSTATE SPECIFIC ANTIGEN (PSA): Primary | ICD-10-CM

## 2022-12-07 NOTE — TELEPHONE ENCOUNTER
[facility] Call Center    Phone Message    May a detailed message be left on voicemail: yes     Reason for Call: Other: Patient is being referred to Urology for an appointment in 1-2 weeks for elevated psa please review and call patient to schedule      Action Taken: Message routed to:  [facility] Urology     Travel Screening: Not Applicable

## 2022-12-15 ENCOUNTER — TELEPHONE (OUTPATIENT)
Dept: FAMILY MEDICINE | Facility: CLINIC | Age: 87
End: 2022-12-15

## 2022-12-16 NOTE — TELEPHONE ENCOUNTER
Call pt re:     Mr. Kumar, It appears you have seen the high psa (prostate cancer screening test result) but I wanted to [place] back and make sure we have a plan.  Considering it is reasonably higher than the last check I would recommend seeing a urologist for an evaluation for prostate cancer.       I will place a referral for this.      The other labs were in good ranges.      Best,      Joel Wegener,MD (bounce back message sent)

## 2022-12-16 NOTE — TELEPHONE ENCOUNTER
Updated patient  Verbalized understanding  Lab result mailed to patient per pt request  Urology scheduling number provided  Louisa GARCIA RN

## 2023-01-10 ENCOUNTER — OFFICE VISIT (OUTPATIENT)
Dept: UROLOGY | Facility: CLINIC | Age: 88
End: 2023-01-10
Payer: COMMERCIAL

## 2023-01-10 VITALS — HEART RATE: 74 BPM | SYSTOLIC BLOOD PRESSURE: 124 MMHG | DIASTOLIC BLOOD PRESSURE: 66 MMHG | OXYGEN SATURATION: 97 %

## 2023-01-10 DIAGNOSIS — R97.20 ELEVATED PROSTATE SPECIFIC ANTIGEN (PSA): ICD-10-CM

## 2023-01-10 LAB
PSA FREE MFR SERPL: 14.72 %
PSA FREE SERPL-MCNC: 2.4 NG/ML
PSA SERPL-MCNC: 16.3 NG/ML

## 2023-01-10 PROCEDURE — 84153 ASSAY OF PSA TOTAL: CPT | Performed by: UROLOGY

## 2023-01-10 PROCEDURE — 36415 COLL VENOUS BLD VENIPUNCTURE: CPT | Performed by: UROLOGY

## 2023-01-10 PROCEDURE — 84154 ASSAY OF PSA FREE: CPT | Performed by: UROLOGY

## 2023-01-10 PROCEDURE — 99203 OFFICE O/P NEW LOW 30 MIN: CPT | Mod: 25 | Performed by: UROLOGY

## 2023-01-10 PROCEDURE — 51798 US URINE CAPACITY MEASURE: CPT | Performed by: UROLOGY

## 2023-01-10 NOTE — PROGRESS NOTES
S: Sharif Kumar is a pleasant  87 year old male who was requested to be seen by  Wegener, Joel Daniel Irwin for a consult with regard to patient's elevated PSA.  His recent PSA was found to be   PSA   Date Value Ref Range Status   2010 3.89 0 - 4 ug/L Final     Prostate Specific Antigen Screen   Date Value Ref Range Status   2022 22.70 (H) 0.00 - 4.00 ug/L Final   .  His previous PSA was normal many years ago.  Patient complains of mild lower urinary symptoms.  He has no history of elevated PSA.  His AUA Symptom Score:  Low.  He has longevity that runs in his family.  Most members live past their 90's.  Current Outpatient Medications   Medication Sig Dispense Refill     acetaminophen (TYLENOL) 500 MG tablet Take 500-1,000 mg by mouth every 6 hours as needed for mild pain       fluticasone (FLONASE) 50 MCG/ACT nasal spray Spray 1 spray into both nostrils daily       loratadine (CLARITIN) 10 MG tablet Take 10 mg by mouth daily        Allergies   Allergen Reactions     Seasonal Allergies      Molds, grass      Past Medical History:   Diagnosis Date     Appendicitis, unspecified appendicitis type 2020    Added automatically from request for surgery [id number]     Helicobacter pylori (H. pylori)      Hypertrophy (benign) of prostate      Intermittent asthma 3/20/2012     Polyp of gallbladder 3/20/2012     Priapism      Ruptured appendicitis 2020     Past Surgical History:   Procedure Laterality Date     LAPAROSCOPIC APPENDECTOMY N/A 2020    Procedure: APPENDECTOMY, LAPAROSCOPIC;  Surgeon: Mercedes Sommer MD;  Location:  OR      Family History   Problem Relation Age of Onset     Alzheimer Disease Mother          age 92; dementia by age 85     Cerebrovascular Disease Father          age 97     Diabetes Brother      Other Cancer Brother         [employer] vet; agent Orange exposure; wrote a book     Coronary Artery Disease No family hx of      Hypertension No family hx of       Hyperlipidemia No family hx of      Breast Cancer No family hx of      Colon Cancer No family hx of      Prostate Cancer No family hx of      Depression No family hx of      Anxiety Disorder No family hx of      Mental Illness No family hx of      Substance Abuse No family hx of      Anesthesia Reaction No family hx of      Asthma No family hx of      Osteoporosis No family hx of      Genetic Disorder No family hx of      Thyroid Disease No family hx of      Obesity No family hx of      Unknown/Adopted No family hx of      He does not have a family history of prostate cancer.  Social History     Socioeconomic History     Marital status:      Spouse name:      Number of children: 4     Years of education: Not on file     Highest education level: Not on file   Occupational History     Employer: RETIRED     Comment: [employer]; retired    Tobacco Use     Smoking status: Never     Smokeless tobacco: Never     Tobacco comments:     occasional cigar maybe twice a year   Substance and Sexual Activity     Alcohol use: Yes     Comment: OCCASIONAL WINE     Drug use: No     Sexual activity: Not Currently   Other Topics Concern     Parent/sibling w/ CABG, MI or angioplasty before 65F 55M? Yes   Social History Narrative    Referred by Dr. Lynn                                       Mother had Alzheimer's; based on autopsy result.     Dx age 85;  age 92.                           Her mother also had Alzheimer's.         Social Determinants of Health     Financial Resource Strain: Not on file   Food Insecurity: Not on file   Transportation Needs: Not on file   Physical Activity: Not on file   Stress: Not on file   Social Connections: Not on file   Intimate Partner Violence: Not on file   Housing Stability: Not on file        REVIEW OF SYSTEMS  =================  C: NEGATIVE for fever, chills, change in weight  I: NEGATIVE for worrisome rashes, moles or lesions  E/M: NEGATIVE for ear, mouth and throat  problems  R: NEGATIVE for significant cough or SHORTNESS OF BREATH  CV:  NEGATIVE for chest pain, palpitations or peripheral edema  GI: NEGATIVE for nausea, abdominal pain, heartburn, or change in bowel habits  NEURO: NEGATIVE  PSYCH: NEGATIVE    Physical Exam:  /66 (BP Location: Right arm, Patient Position: Chair, Cuff Size: Adult Large)   Pulse 74   SpO2 97%    Patient is pleasant, in no acute distress, good general condition.  Lung: no evidence of respiratory distress    Abdomen: Soft, nondistended, non tender. No masses. No rebound or guarding.   Exam: penis no discharge.  Testis no masses.  No scrotal skin lesion.  Prostate 25 gm smooth.  No nodule  Skin: Warm and dry.  No redness.  Musculaskeletal: moving all extremities.  No weakness.  Neuro non focal  Psych normal mood and affect    Assessment/Plan:   (R97.20) Elevated prostate specific antigen (PSA)  Comment:    Plan:      I had a long discussion with Mr. Kumar today with regard to his PSA result.  Probability of having prostate cancer in his age group discussed.  Natural progression of prostate cancer also discussed.  Most men in the 80s have prostate cancer however most  from natural causes.  Pros and cons of prostate biopsy for elevated PSA discussed.  Pros and cons of prostate cancer treatment at his age group discussed.  Most of the time I do not recommend prostate biopsy for elevated PSA for men in the 80s however there is longevity in his family and patient is extremely healthy.  I will recheck a PSA total and free today.  Info about prostate biopsy provided today.

## 2023-01-10 NOTE — PATIENT INSTRUCTIONS
Prostate Ultrasound Instructions  Dr. Driscoll  [address] [facility] [address]  [phone]      Appointment Date: ___   Time: ___    Take antibiotics as directed on label. START THE DAY OF THE BIOPSY   1 Fleets enema to be done 1    - 2 hours before procedure   NOTHING BY MOUTH AFTER THE ENEMA   If you are taking blood thinners (Aspirin, ibuprofen, Aleve) this is to be stopped ONE WEEK before the procedure. Tylenol is ok. If you are taking Coumadin, you must check with your primary doctor for further instructions  Please follow up in the office with the doctor ONE WEEK after the procedure to go over results      Follow up appointment:  Date: ___ Time:  ___  PROSTATE BIOPSY  Patient information:  You have had an examination of the prostate gland called a ERIN (digital rectal examination) where a finger was inserted into your rectum to enable the doctor to feel your prostate gland. You may also have had a PSA (prostate specific antigen) blood test.  Sometimes an elevated PSA level and an abnormal ERIN can be signs of prostate cancer. The doctor has recommended that you have a prostate biopsy.  What is a prostate biopsy:  You will be positioned for the biopsy as preferred by the physician.  An ultrasound probe is inserted into the rectum and a needle is then passed through the wall of the rectum and an injection of local anesthetic is given. Following the injection of local anesthetic, a needle will be inserted into the prostate gland to take a sample of cells. The doctor will usually take 12 separate biopsies.  You can hear some clicking sounds from the biopsy instrument whilst the biopsies are taken.  The procedure will take approximately five to ten minutes.   What are the risks?  Infection: As there is a chance of infection we give you antibiotics before the procedure to reduce the risk. However is you experience a fever, chills, nausea, or just not feeling up to 72 hours after the biopsy you need to go  directly to the emergency room.   Antibiotics: The physician will send a prescription to your preferred pharmacy.  We will call you once your culture results come back to let you that you can pick them up. You will follow the directions on the bottle.  Sometimes 2 different antibiotics are sent. Start the antibiotics on THE DAY OF THE BIOPSY.   Bleeding: Some men will see blood in the urine, semen and stool.  Bleeding can last for 6-8 weeks intermittently.  If you take any medication such as aspirin, warfarin, clopidogrel then the risk of bleeding will be higher.   Retention: Some men are unable to pass urine after the biopsy. This is called urine retention. This is very rare.  If you are unable to urinate please call our clinic or go to urgent care or the emergency room.   Getting the results:  The biopsy samples will be sent to a laboratory for examination by a pathologist.  It can take up to one week, sometimes longer, for your doctor to receive the results.  You will be given an appointment to return to clinic for the results of your biopsy within 1-2 weeks. If you do not receive an appointment for the results of the biopsy at the time of the scheduling, please contact the clinic to arrange an appointment for the results.   WE ARE NOT IN ANY CIRCUMSTANCES PERMITTED TO GIVE RESULTS OVER THE PHONE    What to expect following a biopsy:  You are advised to take it easy for the remainder of the day.  You may eat and drink as normal.  No restrictions to your normal daily routine.  As there is a risk of infection you will be given a course of antibiotics.  PLEASE COMPLETE THE FULL COURSE AS INSTRUCTED   Contact information:  Please call the clinic with any further questions [phone] *Do not leave an urgent message on this voicemail as we may not receive it until the following business day*

## 2023-04-07 ENCOUNTER — VIRTUAL VISIT (OUTPATIENT)
Dept: FAMILY MEDICINE | Facility: CLINIC | Age: 88
End: 2023-04-07
Payer: COMMERCIAL

## 2023-04-07 DIAGNOSIS — R51.9 SINUS HEADACHE: Primary | ICD-10-CM

## 2023-04-07 DIAGNOSIS — R97.20 ELEVATED PROSTATE SPECIFIC ANTIGEN (PSA): ICD-10-CM

## 2023-04-07 PROCEDURE — 99213 OFFICE O/P EST LOW 20 MIN: CPT | Mod: VID | Performed by: FAMILY MEDICINE

## 2023-04-07 ASSESSMENT — ASTHMA QUESTIONNAIRES
QUESTION_3 LAST FOUR WEEKS HOW OFTEN DID YOUR ASTHMA SYMPTOMS (WHEEZING, COUGHING, SHORTNESS OF BREATH, CHEST TIGHTNESS OR PAIN) WAKE YOU UP AT NIGHT OR EARLIER THAN USUAL IN THE MORNING: NOT AT ALL
ACT_TOTALSCORE: 23
QUESTION_2 LAST FOUR WEEKS HOW OFTEN HAVE YOU HAD SHORTNESS OF BREATH: NOT AT ALL
ACT_TOTALSCORE: 23
QUESTION_4 LAST FOUR WEEKS HOW OFTEN HAVE YOU USED YOUR RESCUE INHALER OR NEBULIZER MEDICATION (SUCH AS ALBUTEROL): ONCE A WEEK OR LESS
QUESTION_5 LAST FOUR WEEKS HOW WOULD YOU RATE YOUR ASTHMA CONTROL: WELL CONTROLLED
QUESTION_1 LAST FOUR WEEKS HOW MUCH OF THE TIME DID YOUR ASTHMA KEEP YOU FROM GETTING AS MUCH DONE AT WORK, SCHOOL OR AT HOME: NONE OF THE TIME

## 2023-04-07 NOTE — PROGRESS NOTES
"Richard is a 87 year old who is being evaluated via a billable video visit.      How would you like to obtain your AVS? Mail a copy  If the video visit is dropped, the invitation should be resent by: Send to e-mail at: [email or web address]  Will anyone else be joining your video visit? No          Assessment & Plan     Sinus headache  Much improved with claritin.  Did not tolerate flonase well.  Nearly resolved.  Recommended adding netti pot or nasal saline rinses to allergy regimen.     Elevated prostate specific antigen (PSA)  Reviewed past two results (last check actually lower in January than in September at physical.)   Reviewed dr. fletcher's information.  At this point plan to re-check at physical with me in September.                    BMI:   Estimated body mass index is 28.26 kg/m  as calculated from the following:    Height as of 9/8/22: 1.638 m (5' 4.5\").    Weight as of 9/8/22: 75.8 kg (167 lb 3.2 oz).           Joel Daniel Wegener, MD  [facility]    Subjective   Richard is a 87 year old, presenting for the following health issues:  Migraine         View : No data to display.              History of Present Illness       Headaches:   Since the patient's last clinic visit, headaches are: improved  The patient is getting headaches:  Once a week  He is able to do normal daily activities when he has a migraine.  The patient is taking the following rescue/relief medications:  Other   Patient states \"I get some relief\" from the rescue/relief medications.   The patient is taking the following medications to prevent migraines:  No medications to prevent migraines  In the past 4 weeks, the patient has gone to an Urgent Care or Emergency Room 0 times times due to headaches.    He eats 2-3 servings of fruits and vegetables daily.He consumes 2 sweetened beverage(s) daily.He exercises with enough effort to increase his heart rate 10 to 19 minutes per day.  He exercises with enough effort to increase his " heart rate 5 days per week.   He is taking medications regularly.               Review of Systems         Objective    Vitals - Patient Reported  Pain Score: Mild Pain (2)  Pain Loc: Hip      Vitals:  No vitals were obtained today due to virtual visit.    Physical Exam   GENERAL: Healthy, alert and no distress  EYES: Eyes grossly normal to inspection.  No discharge or erythema, or obvious scleral/conjunctival abnormalities.  RESP: No audible wheeze, cough, or visible cyanosis.  No visible retractions or increased work of breathing.    SKIN: Visible skin clear. No significant rash, abnormal pigmentation or lesions.  NEURO: Cranial nerves grossly intact.  Mentation and speech appropriate for age.  PSYCH: Mentation appears normal, affect normal/bright, judgement and insight intact, normal speech and appearance well-groomed.                Video-Visit Details    Type of service:  Video Visit   Video Start Time: 10:14  Video End Time:10:26    Originating Location (pt. Location): Home    Distant Location (provider location):  On-site  Platform used for Video Visit: Ethel

## 2023-05-30 ENCOUNTER — APPOINTMENT (OUTPATIENT)
Dept: URBAN - METROPOLITAN AREA CLINIC 256 | Age: 88
Setting detail: DERMATOLOGY
End: 2023-05-30

## 2023-05-30 VITALS — HEIGHT: 66 IN | WEIGHT: 160 LBS

## 2023-05-30 DIAGNOSIS — Z71.89 OTHER SPECIFIED COUNSELING: ICD-10-CM

## 2023-05-30 DIAGNOSIS — L57.0 ACTINIC KERATOSIS: ICD-10-CM

## 2023-05-30 DIAGNOSIS — L82.1 OTHER SEBORRHEIC KERATOSIS: ICD-10-CM

## 2023-05-30 DIAGNOSIS — D18.0 HEMANGIOMA: ICD-10-CM

## 2023-05-30 DIAGNOSIS — D22 MELANOCYTIC NEVI: ICD-10-CM

## 2023-05-30 DIAGNOSIS — L57.8 OTHER SKIN CHANGES DUE TO CHRONIC EXPOSURE TO NONIONIZING RADIATION: ICD-10-CM

## 2023-05-30 DIAGNOSIS — B35.6 TINEA CRURIS: ICD-10-CM

## 2023-05-30 PROBLEM — D22.71 MELANOCYTIC NEVI OF RIGHT LOWER LIMB, INCLUDING HIP: Status: ACTIVE | Noted: 2023-05-30

## 2023-05-30 PROBLEM — D22.72 MELANOCYTIC NEVI OF LEFT LOWER LIMB, INCLUDING HIP: Status: ACTIVE | Noted: 2023-05-30

## 2023-05-30 PROBLEM — D22.39 MELANOCYTIC NEVI OF OTHER PARTS OF FACE: Status: ACTIVE | Noted: 2023-05-30

## 2023-05-30 PROBLEM — D22.61 MELANOCYTIC NEVI OF RIGHT UPPER LIMB, INCLUDING SHOULDER: Status: ACTIVE | Noted: 2023-05-30

## 2023-05-30 PROBLEM — D18.01 HEMANGIOMA OF SKIN AND SUBCUTANEOUS TISSUE: Status: ACTIVE | Noted: 2023-05-30

## 2023-05-30 PROBLEM — D22.62 MELANOCYTIC NEVI OF LEFT UPPER LIMB, INCLUDING SHOULDER: Status: ACTIVE | Noted: 2023-05-30

## 2023-05-30 PROBLEM — D22.5 MELANOCYTIC NEVI OF TRUNK: Status: ACTIVE | Noted: 2023-05-30

## 2023-05-30 PROCEDURE — 17000 DESTRUCT PREMALG LESION: CPT

## 2023-05-30 PROCEDURE — 17003 DESTRUCT PREMALG LES 2-14: CPT

## 2023-05-30 PROCEDURE — OTHER COUNSELING: OTHER

## 2023-05-30 PROCEDURE — OTHER MIPS QUALITY: OTHER

## 2023-05-30 PROCEDURE — 99213 OFFICE O/P EST LOW 20 MIN: CPT | Mod: 25

## 2023-05-30 PROCEDURE — OTHER LIQUID NITROGEN: OTHER

## 2023-05-30 ASSESSMENT — LOCATION SIMPLE DESCRIPTION DERM
LOCATION SIMPLE: LEFT POSTERIOR THIGH
LOCATION SIMPLE: RIGHT POSTERIOR THIGH
LOCATION SIMPLE: RIGHT POSTERIOR UPPER ARM
LOCATION SIMPLE: LEFT POSTERIOR UPPER ARM
LOCATION SIMPLE: LEFT UPPER BACK
LOCATION SIMPLE: RIGHT FOREHEAD
LOCATION SIMPLE: LEFT CHEEK
LOCATION SIMPLE: SCALP
LOCATION SIMPLE: GROIN

## 2023-05-30 ASSESSMENT — LOCATION DETAILED DESCRIPTION DERM
LOCATION DETAILED: LEFT SUPERIOR PARIETAL SCALP
LOCATION DETAILED: RIGHT INFERIOR MEDIAL FOREHEAD
LOCATION DETAILED: LEFT INFERIOR UPPER BACK
LOCATION DETAILED: LEFT MEDIAL UPPER BACK
LOCATION DETAILED: LEFT DISTAL POSTERIOR THIGH
LOCATION DETAILED: LEFT DISTAL POSTERIOR UPPER ARM
LOCATION DETAILED: LEFT SUPERIOR UPPER BACK
LOCATION DETAILED: RIGHT DISTAL POSTERIOR THIGH
LOCATION DETAILED: RIGHT FOREHEAD
LOCATION DETAILED: RIGHT DISTAL POSTERIOR UPPER ARM
LOCATION DETAILED: LEFT INFERIOR CENTRAL MALAR CHEEK
LOCATION DETAILED: LEFT INGUINAL CREASE

## 2023-05-30 ASSESSMENT — LOCATION ZONE DERM
LOCATION ZONE: SCALP
LOCATION ZONE: FACE
LOCATION ZONE: TRUNK
LOCATION ZONE: ARM
LOCATION ZONE: LEG

## 2023-05-30 NOTE — PROCEDURE: MIPS QUALITY
Quality 47: Advance Care Plan: Advance Care Planning discussed and documented; advance care plan or surrogate decision maker documented in the medical record.
Quality 111:Pneumonia Vaccination Status For Older Adults: Patient received any pneumococcal conjugate or polysaccharide vaccine on or after their 60th birthday and before the end of the measurement period
Detail Level: Detailed
Quality 431: Preventive Care And Screening: Unhealthy Alcohol Use - Screening: Patient not identified as an unhealthy alcohol user when screened for unhealthy alcohol use using a systematic screening method
Quality 130: Documentation Of Current Medications In The Medical Record: Current Medications Documented
Quality 110: Preventive Care And Screening: Influenza Immunization: Influenza Immunization Administered during Influenza season
Quality 226: Preventive Care And Screening: Tobacco Use: Screening And Cessation Intervention: Patient screened for tobacco use and is an ex/non-smoker

## 2023-05-30 NOTE — PROCEDURE: LIQUID NITROGEN
Show Applicator Variable?: Yes
Consent: The patient's consent was obtained including but not limited to risks of crusting, scabbing, blistering, scarring, darker or lighter pigmentary change, recurrence, incomplete removal and infection.
Duration Of Freeze Thaw-Cycle (Seconds): 5
Application Tool (Optional): Liquid Nitrogen Sprayer
Detail Level: Detailed
Post-Care Instructions: I reviewed with the patient in detail post-care instructions. Patient is to wear sunprotection, and avoid picking at any of the treated lesions. Pt may apply Vaseline to crusted or scabbing areas.
Render Post-Care Instructions In Note?: no
Number Of Freeze-Thaw Cycles: 1 freeze-thaw cycle

## 2023-05-30 NOTE — HPI: PREVENTATIVE SKIN CHECK
What Is The Reason For Today's Visit?: Full Body Skin Examination
Additional History: Patient reports some asymptomatic lesions on his scalp and a persistent jock itch he would like Dr. Arita to look at. Patient reports that he has tried over-the-counter products to treat his jock itch, with no success.

## 2023-06-08 ENCOUNTER — VIRTUAL VISIT (OUTPATIENT)
Dept: FAMILY MEDICINE | Facility: CLINIC | Age: 88
End: 2023-06-08
Payer: COMMERCIAL

## 2023-06-08 ENCOUNTER — NURSE TRIAGE (OUTPATIENT)
Dept: FAMILY MEDICINE | Facility: CLINIC | Age: 88
End: 2023-06-08

## 2023-06-08 DIAGNOSIS — M79.651 PAIN OF RIGHT THIGH: Primary | ICD-10-CM

## 2023-06-08 PROCEDURE — 99213 OFFICE O/P EST LOW 20 MIN: CPT | Mod: VID | Performed by: FAMILY MEDICINE

## 2023-06-08 RX ORDER — PREDNISONE 20 MG/1
20 TABLET ORAL DAILY
Qty: 5 TABLET | Refills: 0 | Status: SHIPPED | OUTPATIENT
Start: 2023-06-08 | End: 2023-06-09

## 2023-06-08 NOTE — TELEPHONE ENCOUNTER
Nurse Triage SBAR    Is this a 2nd Level Triage? YES, LICENSED PRACTITIONER REVIEW IS REQUIRED    Situation:   Patient is experiencing intermittent, severe shooting thigh pain of the right thigh. Pain shoots down to knee. Pain started yesterday morning. Sitting when this happens and taking Tylenol helps with the pain. He took Tylenol last night and was able to sleep, but pain came back today.    Background:   Patient has a history of bursitis in his right leg 5 years ago and received cortisone injections.     Has a history of a stroke 15 years ago.     Assessment:   He is not having any swelling or redness of the leg  No fevers noted.   No injuries or increased exercise to the leg.  Patient normally takes a 1.5 mile walk daily and doesn't feel like he could do that right now.  Pain seems to come on if he stands too long.  He is able to walk, but is having pain.    Protocol Recommended Disposition:   Go To ED/UCC Now (Or To Office With PCP Approval)    Recommendation:   Please call patient to let him know if he needs to be seen. Pain has been controlled with Tylenol and sitting.     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     triage team    Does the patient meet one of the following criteria for ADS visit consideration? 16+ years old, with an [facility] PCP     TIP  Providers, please consider if this condition is appropriate for management at one of our Acute and Diagnostic Services sites.     If patient is a good candidate, please use dotphrase <dot>triageresponse and select Refer to ADS to document.     Alyssa Sidhu RN  [facility]       Reason for Disposition    Long-distance travel in past month (e.g., car, bus, train, plane; with trip lasting 6 or more hours)    Additional Information    Negative: Looks like a broken bone or dislocated joint (e.g., crooked or deformed)    Negative: Sounds like a life-threatening emergency to the  "triager    Negative: Followed a hip injury    Negative: Followed a knee injury    Negative: Followed an ankle or foot injury    Negative: Back pain radiating (shooting) into leg(s)    Negative: Foot pain is main symptom    Negative: Ankle pain is main symptom    Negative: Knee pain is main symptom    Negative: Leg swelling is main symptom    Negative: Chest pain    Negative: Difficulty breathing    Negative: Entire foot is cool or blue in comparison to other side    Negative: Unable to walk    Negative: Fever and red area (or area very tender to touch)    Negative: Swollen joint and fever    Negative: Thigh or calf pain in only one leg and present > 1 hour    Negative: Thigh, calf, or ankle swelling in only one leg    Negative: Thigh, calf, or ankle swelling in both legs, but one side is definitely more swollen    Negative: History of prior 'blood clot' in leg or lungs (i.e., deep vein thrombosis, pulmonary embolism)    Negative: History of inherited increased risk of blood clots (e.g., factor 5 Leiden, antithrombin 3, protein C or protein S deficiency, prothrombin mutation)    Negative: Major surgery in past month    Negative: Hip or leg fracture (broken bone) in past month (or had cast on leg or ankle in past month)    Negative: Illness requiring prolonged bedrest in past month (e.g., immobilization, long hospital stay)    Answer Assessment - Initial Assessment Questions  1. ONSET: \"When did the pain start?\"       Yesterday morning.   2. LOCATION: \"Where is the pain located?\"       Right and shoots down toward the knee  3. PAIN: \"How bad is the pain?\"    (Scale 1-10; or mild, moderate, severe)    -  MILD (1-3): doesn't interfere with normal activities     -  MODERATE (4-7): interferes with normal activities (e.g., work or school) or awakens from sleep, limping     -  SEVERE (8-10): excruciating pain, unable to do any normal activities, unable to walk      Severe-intermittent pain that is improved with sitting  4. " "WORK OR EXERCISE: \"Has there been any recent work or exercise that involved this part of the body?\"       No  5. CAUSE: \"What do you think is causing the leg pain?\"      Standing too long create the conditions for the pain.  6. OTHER SYMPTOMS: \"Do you have any other symptoms?\" (e.g., chest pain, back pain, breathing difficulty, swelling, rash, fever, numbness, weakness)      No  7. PREGNANCY: \"Is there any chance you are pregnant?\" \"When was your last menstrual period?\"      n/a    Protocols used: LEG PAIN-A-OH      "

## 2023-06-08 NOTE — TELEPHONE ENCOUNTER
Patient called back   Would like to submit an e-vist  Sent him a link on Boomi     Patient was not able to do an e-visit   Scheduled patient for VV     COLE Cabrera

## 2023-06-08 NOTE — TELEPHONE ENCOUNTER
Provider Response to 2nd Level Triage Request    I have reviewed the RN documentation. My recommendation is:  E-Visit or virtual visit later this afternoon (double book during my virtual visit time at end of today) and we can review treatments such as prednisone and we can decide if a visit is needed.     Joel Wegener,MD w

## 2023-06-08 NOTE — PROGRESS NOTES
"Richard is a 87 year old who is being evaluated via a billable video visit.      How would you like to obtain your AVS? Mail a copy  If the video visit is dropped, the invitation should be resent by: Send to e-mail at: [email or web address]  Will anyone else be joining your video visit? No          Assessment & Plan     Pain of right thigh/shooting pain down side of leg without back pain.  Not always present.  Comes on suddenly and makes leg buckle. No falls.   Would not recommend nsaids due to ckd/lower gfr.      Start prednisone 20mg in am tomorrow (since activating/might cause insomnia).      Follow up tomorrow for xray/exam.  In meantime also ice over hip 20 minutes 3-4 times day given previous h/o bursitis which this could be consistent with.   - predniSONE (DELTASONE) 20 MG tablet  Dispense: 5 tablet; Refill: 0               BMI:   Estimated body mass index is 28.26 kg/m  as calculated from the following:    Height as of 9/8/22: 1.638 m (5' 4.5\").    Weight as of 9/8/22: 75.8 kg (167 lb 3.2 oz).           Joel Daniel Wegener, MD  [facility]    Subjective   Richard is a 87 year old, presenting for the following health issues:  Musculoskeletal Problem (Thigh Pain)        4/7/2023     9:28 AM   Additional Questions   Roomed by Ade GALVAN     History of Present Illness       Reason for visit:  Thigh Pain - Right  Symptom onset:  1-3 days ago  Symptoms include:  Sharp, sporadic pain in the Right Thigh. Trouble walking, almost power.  Symptom intensity:  Severe  Symptom progression:  Improving  Had these symptoms before:  Yes  Has tried/received treatment for these symptoms:  Yes  Previous treatment was successful:  Yes  Prior treatment description:  Cortisone shot in the lower back helped leg pain  What makes it worse:  Standing/Walking  What makes it better:  Tylenol, Sitting    He eats 2-3 servings of fruits and vegetables daily.He consumes 2 sweetened beverage(s) daily.He exercises with enough " effort to increase his heart rate 20 to 29 minutes per day.  He exercises with enough effort to increase his heart rate 6 days per week.   He is taking medications regularly.               Review of Systems         Objective    Vitals - Patient Reported  Pain Score: Extreme Pain (8)  Pain Loc: Other - see comment (Leg pain)      Vitals:  No vitals were obtained today due to virtual visit.    Physical Exam   GENERAL: Healthy, alert and no distress  EYES: Eyes grossly normal to inspection.  No discharge or erythema, or obvious scleral/conjunctival abnormalities.  RESP: No audible wheeze, cough, or visible cyanosis.  No visible retractions or increased work of breathing.    SKIN: Visible skin clear. No significant rash, abnormal pigmentation or lesions.  NEURO: Cranial nerves grossly intact.  Mentation and speech appropriate for age.  PSYCH: Mentation appears normal, affect normal/bright, judgement and insight intact, normal speech and appearance well-groomed.                Video-Visit Details    Type of service:  Video Visit   Video Start Time: 5:14  Video End Time:5:27 PM    Originating Location (pt. Location): Home    Distant Location (provider location):  On-site  Platform used for Video Visit: Ethel

## 2023-06-09 ENCOUNTER — ANCILLARY PROCEDURE (OUTPATIENT)
Dept: GENERAL RADIOLOGY | Facility: CLINIC | Age: 88
End: 2023-06-09
Attending: FAMILY MEDICINE
Payer: COMMERCIAL

## 2023-06-09 ENCOUNTER — OFFICE VISIT (OUTPATIENT)
Dept: FAMILY MEDICINE | Facility: CLINIC | Age: 88
End: 2023-06-09
Payer: COMMERCIAL

## 2023-06-09 VITALS
SYSTOLIC BLOOD PRESSURE: 115 MMHG | BODY MASS INDEX: 27 KG/M2 | HEART RATE: 84 BPM | RESPIRATION RATE: 18 BRPM | DIASTOLIC BLOOD PRESSURE: 60 MMHG | TEMPERATURE: 97.3 F | HEIGHT: 66 IN | WEIGHT: 168 LBS | OXYGEN SATURATION: 96 %

## 2023-06-09 DIAGNOSIS — M25.551 HIP PAIN, RIGHT: Primary | ICD-10-CM

## 2023-06-09 DIAGNOSIS — M25.551 HIP PAIN, RIGHT: ICD-10-CM

## 2023-06-09 PROCEDURE — 73502 X-RAY EXAM HIP UNI 2-3 VIEWS: CPT | Mod: TC | Performed by: RADIOLOGY

## 2023-06-09 PROCEDURE — 99213 OFFICE O/P EST LOW 20 MIN: CPT | Performed by: FAMILY MEDICINE

## 2023-06-09 ASSESSMENT — ANXIETY QUESTIONNAIRES
8. IF YOU CHECKED OFF ANY PROBLEMS, HOW DIFFICULT HAVE THESE MADE IT FOR YOU TO DO YOUR WORK, TAKE CARE OF THINGS AT HOME, OR GET ALONG WITH OTHER PEOPLE?: SOMEWHAT DIFFICULT
2. NOT BEING ABLE TO STOP OR CONTROL WORRYING: NOT AT ALL
GAD7 TOTAL SCORE: 3
3. WORRYING TOO MUCH ABOUT DIFFERENT THINGS: SEVERAL DAYS
1. FEELING NERVOUS, ANXIOUS, OR ON EDGE: SEVERAL DAYS
7. FEELING AFRAID AS IF SOMETHING AWFUL MIGHT HAPPEN: NOT AT ALL
IF YOU CHECKED OFF ANY PROBLEMS ON THIS QUESTIONNAIRE, HOW DIFFICULT HAVE THESE PROBLEMS MADE IT FOR YOU TO DO YOUR WORK, TAKE CARE OF THINGS AT HOME, OR GET ALONG WITH OTHER PEOPLE: SOMEWHAT DIFFICULT
GAD7 TOTAL SCORE: 3
4. TROUBLE RELAXING: NOT AT ALL
GAD7 TOTAL SCORE: 3
6. BECOMING EASILY ANNOYED OR IRRITABLE: SEVERAL DAYS
7. FEELING AFRAID AS IF SOMETHING AWFUL MIGHT HAPPEN: NOT AT ALL
5. BEING SO RESTLESS THAT IT IS HARD TO SIT STILL: NOT AT ALL

## 2023-06-09 ASSESSMENT — PATIENT HEALTH QUESTIONNAIRE - PHQ9
SUM OF ALL RESPONSES TO PHQ QUESTIONS 1-9: 3
SUM OF ALL RESPONSES TO PHQ QUESTIONS 1-9: 3
10. IF YOU CHECKED OFF ANY PROBLEMS, HOW DIFFICULT HAVE THESE PROBLEMS MADE IT FOR YOU TO DO YOUR WORK, TAKE CARE OF THINGS AT HOME, OR GET ALONG WITH OTHER PEOPLE: SOMEWHAT DIFFICULT

## 2023-06-09 ASSESSMENT — PAIN SCALES - GENERAL: PAINLEVEL: MILD PAIN (2)

## 2023-06-09 NOTE — PATIENT INSTRUCTIONS
"ASSESSMENT AND PLAN  1. Hip pain, right  With sudden episodes of lateral thigh pain.  Discussed possibility of musculoskeletal problem vs perhaps labrum.        Recommend icing 3-4 times/day.    Having side effects from prednisone so stop the prednisone (foggy).     Recommend physical therapy evaluation and follow up with non-operative orthopedics.  May need to consider hip mri with hip dye injection to evaluate for labral tear depending on progress.       - XR Hip Right 2-3 Views; Future  - Physical Therapy Referral; Future  - Orthopedic  Referral; Future        MYCHART SIGN UP: [email or web address] , 1-323.299.4536    E-VISITS CAN BE DONE FOR CARE/PRESCRIPTIONS WHICH MAY NOT NEED AN IN-PERSON ASSESSMENT - click \"on-line care, then request e-visit\".      ONCARE VISIT/PRESCRIPTIONS: [email or web address]  - we treat nearly 50 common conditions with one hour response time     RADIOLOGY SCHEDULING  [facility]:  [phone]   [facility]: [phone]  [facility]: [phone]    Mammogram and Colonoscopy [phone]    Smoking Cessation: [email or web address], [phone] ([phone])    [facility] Scheduling:  [phone].    CONSUMER PRICE LINE for estimates of test costs:  [phone]       "

## 2023-06-09 NOTE — PROGRESS NOTES
"  ASSESSMENT AND PLAN  1. Hip pain, right  With sudden episodes of lateral thigh pain.  Discussed possibility of musculoskeletal problem vs perhaps labrum.        Recommend icing 3-4 times/day.    Having side effects from prednisone so stop the prednisone (foggy).     Recommend physical therapy evaluation and follow up with non-operative orthopedics.  May need to consider hip mri with hip dye injection to evaluate for labral tear depending on progress.       - XR Hip Right 2-3 Views; Future  - Physical Therapy Referral; Future  - Orthopedic  Referral; Future    Subjective   Richard is a 87 year old, presenting for the following health issues:  Musculoskeletal Problem        6/9/2023     3:14 PM   Additional Questions   Roomed by Ade GALVAN     HPI     Answers for HPI/ROS submitted by the patient on 6/9/2023  If you checked off any problems, how difficult have these problems made it for you to do your work, take care of things at home, or get along with other people?: Somewhat difficult  PHQ9 TOTAL SCORE: 3  NAMRATA 7 TOTAL SCORE: 3              Review of Systems         Objective    /60   Pulse 84   Temp 97.3  F (36.3  C) (Temporal)   Resp 18   Ht 1.676 m (5' 6\")   Wt 76.2 kg (168 lb)   SpO2 96%   BMI 27.12 kg/m    Body mass index is 27.12 kg/m .  Physical Exam   Exam not diagnostic today.  No pain with palpation of hip/greater trochanter. No pain with standing, walking today.   No pain with resisted flextion/extention.  Full hipo range of motion today compared to other side.   No pain with resisted abduction in extension (IT band test.)       No significant arthritis on xray              "

## 2023-06-30 NOTE — TELEPHONE ENCOUNTER
Action June 30, 2023 1:03 PM MT   Action Taken Sent a request for rayus for imaging.     DIAGNOSIS: RT Hip Pain   APPOINTMENT DATE: 07/05/2023   NOTES STATUS DETAILS   OFFICE NOTE from referring provider Internal 06/09/2023 - Wegener, Joel MD- [facility] FP   OFFICE NOTE from other specialist Internal 11/15/2021 - Jose Maria Valera MD -  [facility] Internal Med    06/23/2020 - Gabriel Padilla MD-  [facility] Pain    12/08/2017 - Jessica Delgado CNP - [facility] Neuro    2017 - [facility] Physical Therapy     MEDICATION LIST Internal    MRI PACS [facility]:  03/28/2022-  [facility]    [facility]:  11/14/2017 - [facility]   CT SCAN PACS Internal   XRAYS (IMAGES & REPORTS) PACS Internal

## 2023-07-05 ENCOUNTER — PRE VISIT (OUTPATIENT)
Dept: ORTHOPEDICS | Facility: CLINIC | Age: 88
End: 2023-07-05

## 2023-07-05 ENCOUNTER — OFFICE VISIT (OUTPATIENT)
Dept: ORTHOPEDICS | Facility: CLINIC | Age: 88
End: 2023-07-05
Attending: FAMILY MEDICINE
Payer: COMMERCIAL

## 2023-07-05 VITALS — BODY MASS INDEX: 27.99 KG/M2 | WEIGHT: 168 LBS | HEIGHT: 65 IN

## 2023-07-05 DIAGNOSIS — M25.551 HIP PAIN, RIGHT: ICD-10-CM

## 2023-07-05 PROCEDURE — 99203 OFFICE O/P NEW LOW 30 MIN: CPT | Performed by: PHYSICIAN ASSISTANT

## 2023-07-05 NOTE — PROGRESS NOTES
Chief Complaint: Right hip pain    History: Oskar is an 87-year-old man who is here with complaints of right lateral hip pain.  He reports he has had pain off and on in this area for 5 years.  Over the last few weeks, he has had occasional bouts of shooting pain that has been severe.  It seems to resolve fairly quickly however.  It bothers him occasionally while walking.  He he has no pain at night and can sleep on the right side.  No pain upon waking and standing in the morning.  It seems to progress as the day goes on.  He does have a history of lumbar degenerative disc disease.  He has had epidural steroid injections in his back 5 years ago with little relief.  He also did physical therapy about 5 years ago with good relief.  He has not been keeping up with exercises.  He occasionally does massage therapy, which is helpful.  He denies any groin pain.  No difficulty putting on shoes and socks.  No numbness or tingling in the leg.  No other concerns.    Past Medical History:   Diagnosis Date     Appendicitis, unspecified appendicitis type 2020    Added automatically from request for surgery [id number]     Helicobacter pylori (H. pylori)      Hypertrophy (benign) of prostate      Intermittent asthma 3/20/2012     Polyp of gallbladder 3/20/2012     Priapism      Ruptured appendicitis 2020       Past Surgical History:   Procedure Laterality Date     LAPAROSCOPIC APPENDECTOMY N/A 2020    Procedure: APPENDECTOMY, LAPAROSCOPIC;  Surgeon: Mercedes Sommer MD;  Location:  OR       Family History   Problem Relation Age of Onset     Alzheimer Disease Mother          age 92; dementia by age 85     Cerebrovascular Disease Father          age 97     Diabetes Brother      Other Cancer Brother         [employer] vet; agent Orange exposure; wrote a book     Coronary Artery Disease No family hx of      Hypertension No family hx of      Hyperlipidemia No family hx of      Breast Cancer No family hx of      Colon  Cancer No family hx of      Prostate Cancer No family hx of      Depression No family hx of      Anxiety Disorder No family hx of      Mental Illness No family hx of      Substance Abuse No family hx of      Anesthesia Reaction No family hx of      Asthma No family hx of      Osteoporosis No family hx of      Genetic Disorder No family hx of      Thyroid Disease No family hx of      Obesity No family hx of      Unknown/Adopted No family hx of        Social History     Tobacco Use     Smoking status: Never     Smokeless tobacco: Never     Tobacco comments:     occasional cigar maybe twice a year   Substance Use Topics     Alcohol use: Yes     Comment: OCCASIONAL WINE     Meds:   Current Outpatient Medications   Medication     acetaminophen (TYLENOL) 500 MG tablet     fluticasone (FLONASE) 50 MCG/ACT nasal spray     loratadine (CLARITIN) 10 MG tablet     No current facility-administered medications for this visit.       Allergies:    Allergies   Allergen Reactions     Seasonal Allergies      Molds, grass       Review of Systems:  ROS: 10 point ROS neg other than the symptoms noted above in the HPI.    Physical Exam: There were no vitals taken for this visit.  Richard is a pleasant 87-year-old man who is alert and oriented no apparent distress.  He has a nonantalgic wide-based gait without gait assistance today.  He has minimal tenderness to palpation over the right greater trochanter.  No groin pain today.  No pain with passive rotation of both hips.  He only has approximately 10 to 15 degrees of internal rotation on the right versus 25 degrees on the left.  External rotation is 45 degrees bilaterally.  Resisted strength testing is 5 out of 5 resisted knee extension, knee flexion, hip flexion, AB duction and adduction of the hips bilaterally.  No calf tenderness.  Dorsiflexion and plantarflexion is within normal limits.  Upon standing, he has loss of lumbar lordosis.  He is able to forward bend to approximately his  ankles.  Some discomfort with returning to standing position.  He has only about 5 degrees of lumbar hyperextension.  He has normal sidebending with some discomfort to the right.  Rotation of the spine while standing is approximately 45 degrees with some discomfort to the right.  He is nontender to palpation of the lumbar spine and SI joints today.    Imaging: Right hip x-rays from last month were reviewed.  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  No fracture or other abnormality noted.  Mild SI joint arthritis as well.  I did review a lumbar fine MRI from last year.  This shows multilevel central stenosis and foraminal stenosis with nerve impingement at L2-L3, L4-L5 bilaterally, but worse on the right.    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  I think it is coming from his back, as it was previously 5 years ago.  It sounds like he benefited from physical therapy and stretching exercises.  I would like him to revisit those.  He has the exercises and will do them on his own, instead of formal physical therapy at this time.  If he would like to go to formal physical therapy, he will give us a call.  If he would like to pursue further treatment options for his pain, I would refer him to one of our spine providers to discuss these options.  He is point to wait to do that for now.  He will call if he wants the referral.  I see no issues with his hip that I would recommend intervention for at this time.  He understands and appreciates this discussion.  All questions answered.  He will follow-up as needed.

## 2023-07-05 NOTE — NURSING NOTE
"Reason For Visit:   Chief Complaint   Patient presents with     Consult     Right hip pain // had cortisone injection a few years ago per pt        Ht 1.66 m (5' 5.35\")   Wt 76.2 kg (168 lb)   BMI 27.65 kg/m      Pain Assessment  Patient Currently in Pain: Yes  0-10 Pain Scale: 3  Primary Pain Location: Hip (right)      Jose Ewing ATC    "

## 2023-07-05 NOTE — LETTER
2023         RE: Sharif Kumar  [address]  [facility] [address]        Dear Colleague,    Thank you for referring your patient, Sharif Kumar, to the [facility] [place]. Please see a copy of my visit note below.    Chief Complaint: Right hip pain    History: Oskar is an 87-year-old man who is here with complaints of right lateral hip pain.  He reports he has had pain off and on in this area for 5 years.  Over the last few weeks, he has had occasional bouts of shooting pain that has been severe.  It seems to resolve fairly quickly however.  It bothers him occasionally while walking.  He he has no pain at night and can sleep on the right side.  No pain upon waking and standing in the morning.  It seems to progress as the day goes on.  He does have a history of lumbar degenerative disc disease.  He has had epidural steroid injections in his back 5 years ago with little relief.  He also did physical therapy about 5 years ago with good relief.  He has not been keeping up with exercises.  He occasionally does massage therapy, which is helpful.  He denies any groin pain.  No difficulty putting on shoes and socks.  No numbness or tingling in the leg.  No other concerns.    Past Medical History:   Diagnosis Date    Appendicitis, unspecified appendicitis type 2020    Added automatically from request for surgery [id number]    Helicobacter pylori (H. pylori)     Hypertrophy (benign) of prostate     Intermittent asthma 3/20/2012    Polyp of gallbladder 3/20/2012    Priapism     Ruptured appendicitis 2020       Past Surgical History:   Procedure Laterality Date    LAPAROSCOPIC APPENDECTOMY N/A 2020    Procedure: APPENDECTOMY, LAPAROSCOPIC;  Surgeon: Mercedes Sommer MD;  Location: [facility] OR       Family History   Problem Relation Age of Onset    Alzheimer Disease Mother          age 92; dementia by age 85    Cerebrovascular Disease Father          age 97     Diabetes Brother     Other Cancer Brother         [employer] vet; agent Orange exposure; wrote a book    Coronary Artery Disease No family hx of     Hypertension No family hx of     Hyperlipidemia No family hx of     Breast Cancer No family hx of     Colon Cancer No family hx of     Prostate Cancer No family hx of     Depression No family hx of     Anxiety Disorder No family hx of     Mental Illness No family hx of     Substance Abuse No family hx of     Anesthesia Reaction No family hx of     Asthma No family hx of     Osteoporosis No family hx of     Genetic Disorder No family hx of     Thyroid Disease No family hx of     Obesity No family hx of     Unknown/Adopted No family hx of        Social History     Tobacco Use    Smoking status: Never    Smokeless tobacco: Never    Tobacco comments:     occasional cigar maybe twice a year   Substance Use Topics    Alcohol use: Yes     Comment: OCCASIONAL WINE     Meds:   Current Outpatient Medications   Medication    acetaminophen (TYLENOL) 500 MG tablet    fluticasone (FLONASE) 50 MCG/ACT nasal spray    loratadine (CLARITIN) 10 MG tablet     No current facility-administered medications for this visit.       Allergies:    Allergies   Allergen Reactions    Seasonal Allergies      Molds, grass       Review of Systems:  ROS: 10 point ROS neg other than the symptoms noted above in the HPI.    Physical Exam: There were no vitals taken for this visit.  Richard is a pleasant 87-year-old man who is alert and oriented no apparent distress.  He has a nonantalgic wide-based gait without gait assistance today.  He has minimal tenderness to palpation over the right greater trochanter.  No groin pain today.  No pain with passive rotation of both hips.  He only has approximately 10 to 15 degrees of internal rotation on the right versus 25 degrees on the left.  External rotation is 45 degrees bilaterally.  Resisted strength testing is 5 out of 5 resisted knee extension, knee flexion, hip  flexion, AB duction and adduction of the hips bilaterally.  No calf tenderness.  Dorsiflexion and plantarflexion is within normal limits.  Upon standing, he has loss of lumbar lordosis.  He is able to forward bend to approximately his ankles.  Some discomfort with returning to standing position.  He has only about 5 degrees of lumbar hyperextension.  He has normal sidebending with some discomfort to the right.  Rotation of the spine while standing is approximately 45 degrees with some discomfort to the right.  He is nontender to palpation of the lumbar spine and SI joints today.    Imaging: Right hip x-rays from last month were reviewed.  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  No fracture or other abnormality noted.  Mild SI joint arthritis as well.  I did review a lumbar fine MRI from last year.  This shows multilevel central stenosis and foraminal stenosis with nerve impingement at L2-L3, L4-L5 bilaterally, but worse on the right.    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  I think it is coming from his back, as it was previously 5 years ago.  It sounds like he benefited from physical therapy and stretching exercises.  I would like him to revisit those.  He has the exercises and will do them on his own, instead of formal physical therapy at this time.  If he would like to go to formal physical therapy, he will give us a call.  If he would like to pursue further treatment options for his pain, I would refer him to one of our spine providers to discuss these options.  He is point to wait to do that for now.  He will call if he wants the referral.  I see no issues with his hip that I would recommend intervention for at this time.  He understands and appreciates this discussion.  All  questions answered.  He will follow-up as needed.      Teresa Oden PA-C

## 2023-09-12 ENCOUNTER — OFFICE VISIT (OUTPATIENT)
Dept: FAMILY MEDICINE | Facility: CLINIC | Age: 88
End: 2023-09-12
Attending: FAMILY MEDICINE
Payer: COMMERCIAL

## 2023-09-12 VITALS
SYSTOLIC BLOOD PRESSURE: 102 MMHG | RESPIRATION RATE: 16 BRPM | WEIGHT: 166.1 LBS | DIASTOLIC BLOOD PRESSURE: 60 MMHG | BODY MASS INDEX: 27.67 KG/M2 | OXYGEN SATURATION: 97 % | HEIGHT: 65 IN | TEMPERATURE: 97.3 F | HEART RATE: 62 BPM

## 2023-09-12 DIAGNOSIS — Z12.5 SCREENING FOR PROSTATE CANCER: ICD-10-CM

## 2023-09-12 DIAGNOSIS — R97.20 ELEVATED PROSTATE SPECIFIC ANTIGEN (PSA): ICD-10-CM

## 2023-09-12 DIAGNOSIS — N18.31 CHRONIC KIDNEY DISEASE, STAGE 3A (H): ICD-10-CM

## 2023-09-12 DIAGNOSIS — Z00.00 ENCOUNTER FOR MEDICARE ANNUAL WELLNESS EXAM: Primary | ICD-10-CM

## 2023-09-12 DIAGNOSIS — E78.5 HYPERLIPIDEMIA, UNSPECIFIED HYPERLIPIDEMIA TYPE: ICD-10-CM

## 2023-09-12 LAB
ALBUMIN SERPL BCG-MCNC: 3.8 G/DL (ref 3.5–5.2)
ALP SERPL-CCNC: 76 U/L (ref 40–129)
ALT SERPL W P-5'-P-CCNC: 19 U/L (ref 0–70)
ANION GAP SERPL CALCULATED.3IONS-SCNC: 9 MMOL/L (ref 7–15)
AST SERPL W P-5'-P-CCNC: 22 U/L (ref 0–45)
BILIRUB SERPL-MCNC: 0.4 MG/DL
BUN SERPL-MCNC: 15.2 MG/DL (ref 8–23)
CALCIUM SERPL-MCNC: 9.2 MG/DL (ref 8.8–10.2)
CHLORIDE SERPL-SCNC: 108 MMOL/L (ref 98–107)
CHOLEST SERPL-MCNC: 155 MG/DL
CREAT SERPL-MCNC: 1.16 MG/DL (ref 0.67–1.17)
CREAT UR-MCNC: 112 MG/DL
DEPRECATED HCO3 PLAS-SCNC: 25 MMOL/L (ref 22–29)
EGFRCR SERPLBLD CKD-EPI 2021: 61 ML/MIN/1.73M2
GLUCOSE SERPL-MCNC: 105 MG/DL (ref 70–99)
HDLC SERPL-MCNC: 52 MG/DL
HGB BLD-MCNC: 13.7 G/DL (ref 13.3–17.7)
LDLC SERPL CALC-MCNC: 86 MG/DL
MICROALBUMIN UR-MCNC: <12 MG/L
MICROALBUMIN/CREAT UR: NORMAL MG/G{CREAT}
NONHDLC SERPL-MCNC: 103 MG/DL
POTASSIUM SERPL-SCNC: 4.1 MMOL/L (ref 3.4–5.3)
PROT SERPL-MCNC: 6.5 G/DL (ref 6.4–8.3)
PSA FREE MFR SERPL: 15.46 %
PSA FREE SERPL-MCNC: 3 NG/ML
PSA SERPL DL<=0.01 NG/ML-MCNC: 19.4 NG/ML
SODIUM SERPL-SCNC: 142 MMOL/L (ref 136–145)
TRIGL SERPL-MCNC: 85 MG/DL

## 2023-09-12 PROCEDURE — 36415 COLL VENOUS BLD VENIPUNCTURE: CPT | Performed by: FAMILY MEDICINE

## 2023-09-12 PROCEDURE — 82043 UR ALBUMIN QUANTITATIVE: CPT | Performed by: FAMILY MEDICINE

## 2023-09-12 PROCEDURE — 82570 ASSAY OF URINE CREATININE: CPT | Performed by: FAMILY MEDICINE

## 2023-09-12 PROCEDURE — 84153 ASSAY OF PSA TOTAL: CPT | Performed by: FAMILY MEDICINE

## 2023-09-12 PROCEDURE — 84154 ASSAY OF PSA FREE: CPT | Performed by: FAMILY MEDICINE

## 2023-09-12 PROCEDURE — 85018 HEMOGLOBIN: CPT | Performed by: FAMILY MEDICINE

## 2023-09-12 PROCEDURE — 80061 LIPID PANEL: CPT | Performed by: FAMILY MEDICINE

## 2023-09-12 PROCEDURE — G0439 PPPS, SUBSEQ VISIT: HCPCS | Performed by: FAMILY MEDICINE

## 2023-09-12 PROCEDURE — 80053 COMPREHEN METABOLIC PANEL: CPT | Performed by: FAMILY MEDICINE

## 2023-09-12 ASSESSMENT — PAIN SCALES - GENERAL: PAINLEVEL: MILD PAIN (2)

## 2023-09-12 ASSESSMENT — ASTHMA QUESTIONNAIRES: ACT_TOTALSCORE: 25

## 2023-09-12 ASSESSMENT — ACTIVITIES OF DAILY LIVING (ADL): CURRENT_FUNCTION: NO ASSISTANCE NEEDED

## 2023-09-12 NOTE — PATIENT INSTRUCTIONS
Patient Education   Personalized Prevention Plan  You are due for the preventive services outlined below.  Your care team is available to assist you in scheduling these services.  If you have already completed any of these items, please share that information with your care team to update in your medical record.  Health Maintenance Due   Topic Date Due     Kidney Microalbumin Urine Test  Never done     URINE DRUG SCREEN  Never done     Asthma Action Plan - yearly  11/04/2018     FALL RISK ASSESSMENT  08/30/2023     Flu Vaccine (1) 09/01/2023     Basic Metabolic Panel  09/08/2023     Cholesterol Lab  09/08/2023     Annual Wellness Visit  09/08/2023     Hemoglobin  09/08/2023     Asthma Control Test  10/07/2023

## 2023-09-12 NOTE — PROGRESS NOTES
"SUBJECTIVE:   Richard is a 88 year old who presents for Preventive Visit.      9/12/2023     9:34 AM   Additional Questions   Roomed by Tabatha LOGAN       Are you in the first 12 months of your Medicare coverage?  No    Healthy Habits:     In general, how would you rate your overall health?  Fair    Frequency of exercise:  6-7 days/week    Duration of exercise:  15-30 minutes    Do you usually eat at least 4 servings of fruit and vegetables a day, include whole grains    & fiber and avoid regularly eating high fat or \"junk\" foods?  Yes    Taking medications regularly:  Not Applicable    Barriers to taking medications:  None    Medication side effects:  None    Ability to successfully perform activities of daily living:  No assistance needed    Home Safety:  No safety concerns identified    Hearing Impairment:  Difficulty following a conversation in a noisy restaurant or crowded room, feel that people are mumbling or not speaking clearly, need to ask people to speak up or repeat themselves, difficulty understanding speech on the telephone and difficulty understanding soft or whispered speech    In the past 6 months, have you been bothered by leaking of urine? Yes    In general, how would you rate your overall mental or emotional health?  Good    Additional concerns today:  Yes (psa and pain in right hip for 5 years and just got over covid last week)        Have you ever done Advance Care Planning? (For example, a Health Directive, POLST, or a discussion with a medical provider or your loved ones about your wishes): Yes, advance care planning is on file.      Fall risk  Fallen 2 or more times in the past year?: No  Any fall with injury in the past year?: No    Cognitive Screening   1) Repeat 3 items (Leader, Season, Table)    2) Clock draw: NORMAL  3) 3 item recall: Recalls 2 objects   Results: NORMAL clock, 1-2 items recalled: COGNITIVE IMPAIRMENT LESS LIKELY    Mini-CogTM Copyright S Darlene. Licensed by the author for use in " [facility]; reprinted with permission ([email or web address]@[email or web address]). All rights reserved.      Do you have sleep apnea, excessive snoring or daytime drowsiness? : no    Reviewed and updated as needed this visit by clinical staff                  Reviewed and updated as needed this visit by Provider                 Social History     Tobacco Use    Smoking status: Never    Smokeless tobacco: Never    Tobacco comments:     occasional cigar maybe twice a year   Substance Use Topics    Alcohol use: Yes     Comment: OCCASIONAL WINE             11/12/2018     3:11 PM   Alcohol Use   Prescreen: >3 drinks/day or >7 drinks/week? No          No data to display              Do you have a current opioid prescription? No  Do you use any other controlled substances or medications that are not prescribed by a provider? None              Current providers sharing in care for this patient include:   Patient Care Team:  Wegener, Joel Daniel Irwin, MD as PCP - General (Family Medicine)  Wegener, Joel Daniel Irwin, MD as Assigned PCP  Mike Driscoll MD as Assigned Surgical Provider  Teresa Oden PA-C as Assigned Musculoskeletal Provider    The following health maintenance items are reviewed in Epic and correct as of today:  Health Maintenance   Topic Date Due    MICROALBUMIN  Never done    URINE DRUG SCREEN  Never done    ASTHMA ACTION PLAN  11/04/2018    FALL RISK ASSESSMENT  08/30/2023    INFLUENZA VACCINE (1) 09/01/2023    BMP  09/08/2023    LIPID  09/08/2023    MEDICARE ANNUAL WELLNESS VISIT  09/08/2023    HEMOGLOBIN  09/08/2023    ASTHMA CONTROL TEST  10/07/2023    PSA  01/10/2024    COLORECTAL CANCER SCREENING  03/12/2024    ANNUAL REVIEW OF HM ORDERS  04/07/2024    DTAP/TDAP/TD IMMUNIZATION (2 - Td or Tdap) 12/19/2026    ADVANCE CARE PLANNING  09/20/2027    PHQ-2 (once per calendar year)  Completed    Pneumococcal Vaccine: 65+ Years  Completed    URINALYSIS  Completed    ZOSTER IMMUNIZATION  Completed     "COVID-19 Vaccine  Completed    IPV IMMUNIZATION  Aged Out    HPV IMMUNIZATION  Aged Out    MENINGITIS IMMUNIZATION  Aged Out     Lab work is in process          Review of Systems  Constitutional, HEENT, cardiovascular, pulmonary, GI, , musculoskeletal, neuro, skin, endocrine and psych systems are negative, except as otherwise noted.    OBJECTIVE:   There were no vitals taken for this visit. Estimated body mass index is 27.65 kg/m  as calculated from the following:    Height as of 7/5/23: 1.66 m (5' 5.35\").    Weight as of 7/5/23: 76.2 kg (168 lb).  Physical Exam  GENERAL: healthy, alert and no distress  EYES: Eyes grossly normal to inspection, PERRL and conjunctivae and sclerae normal  HENT: ear canals and TM's normal, nose and mouth without ulcers or lesions  NECK: no adenopathy, no asymmetry, masses, or scars and thyroid normal to palpation  RESP: lungs clear to auscultation - no rales, rhonchi or wheezes  CV: regular rate and rhythm, normal S1 S2, no S3 or S4, no murmur, click or rub, no peripheral edema and peripheral pulses strong  ABDOMEN: soft, nontender, no hepatosplenomegaly, no masses and bowel sounds normal  MS: no gross musculoskeletal defects noted, no edema  SKIN: no suspicious lesions or rashes  NEURO: Normal strength and tone, mentation intact and speech normal  PSYCH: mentation appears normal, affect normal/bright        ASSESSMENT / PLAN:   (Z00.00) Encounter for Medicare annual wellness exam  (primary encounter diagnosis)  Overall well.  Chronic right hip pain which he presumes is arthritis but managing well/not looking for intervention.  Offered/declined xray/physical therapy.     Ambivalent about checking psa understanding false positive issues and current age but in the end decided to re-check.     Routine labs as below.     Reviewed immunizations.     Continues to be active walking, visiting child in [place] next week.     Follow up one year.   (N18.31) Chronic kidney disease, stage 3a " "(H)  Comment:   Plan: Albumin Random Urine Quantitative with Creat         Ratio, Hemoglobin, Comprehensive metabolic         panel (BMP + Alb, Alk Phos, ALT, AST, Total.         Bili, TP)            (E78.5) Hyperlipidemia, unspecified hyperlipidemia type  Comment:   Plan: Lipid panel reflex to direct LDL Non-fasting            (Z12.5) Screening for prostate cancer  Comment:   Plan: PSA, total and free            (R97.20) Elevated prostate specific antigen (PSA)  Comment:   Plan: PSA, total and free            Patient has been advised of split billing requirements and indicates understanding: Yes      COUNSELING:  Reviewed preventive health counseling, as reflected in patient instructions       Regular exercise       Healthy diet/nutrition       Fall risk prevention       Immunizations          BMI:   Estimated body mass index is 27.65 kg/m  as calculated from the following:    Height as of 7/5/23: 1.66 m (5' 5.35\").    Weight as of 7/5/23: 76.2 kg (168 lb).         He reports that he has never smoked. He has never used smokeless tobacco.      Appropriate preventive services were discussed with this patient, including applicable screening as appropriate for cardiovascular disease, diabetes, osteopenia/osteoporosis, and glaucoma.  As appropriate for age/gender, discussed screening for colorectal cancer, prostate cancer, breast cancer, and cervical cancer. Checklist reviewing preventive services available has been given to the patient.    Reviewed patients plan of care and provided an AVS. The Basic Care Plan (routine screening as documented in Health Maintenance) for Sharif meets the Care Plan requirement. This Care Plan has been established and reviewed with the Patient.          Joel Daniel Wegener, MD  [facility]    Identified Health Risks:    "

## 2023-11-28 ENCOUNTER — APPOINTMENT (OUTPATIENT)
Dept: URBAN - METROPOLITAN AREA CLINIC 256 | Age: 88
Setting detail: DERMATOLOGY
End: 2023-11-28

## 2023-11-28 VITALS — WEIGHT: 160 LBS | HEIGHT: 66 IN

## 2023-11-28 DIAGNOSIS — D18.0 HEMANGIOMA: ICD-10-CM

## 2023-11-28 DIAGNOSIS — L85.3 XEROSIS CUTIS: ICD-10-CM

## 2023-11-28 DIAGNOSIS — L57.0 ACTINIC KERATOSIS: ICD-10-CM

## 2023-11-28 DIAGNOSIS — Z71.89 OTHER SPECIFIED COUNSELING: ICD-10-CM

## 2023-11-28 DIAGNOSIS — D22 MELANOCYTIC NEVI: ICD-10-CM

## 2023-11-28 DIAGNOSIS — L24 IRRITANT CONTACT DERMATITIS: ICD-10-CM

## 2023-11-28 DIAGNOSIS — L57.8 OTHER SKIN CHANGES DUE TO CHRONIC EXPOSURE TO NONIONIZING RADIATION: ICD-10-CM

## 2023-11-28 DIAGNOSIS — L82.1 OTHER SEBORRHEIC KERATOSIS: ICD-10-CM

## 2023-11-28 PROBLEM — D22.72 MELANOCYTIC NEVI OF LEFT LOWER LIMB, INCLUDING HIP: Status: ACTIVE | Noted: 2023-11-28

## 2023-11-28 PROBLEM — D22.62 MELANOCYTIC NEVI OF LEFT UPPER LIMB, INCLUDING SHOULDER: Status: ACTIVE | Noted: 2023-11-28

## 2023-11-28 PROBLEM — D22.61 MELANOCYTIC NEVI OF RIGHT UPPER LIMB, INCLUDING SHOULDER: Status: ACTIVE | Noted: 2023-11-28

## 2023-11-28 PROBLEM — D22.5 MELANOCYTIC NEVI OF TRUNK: Status: ACTIVE | Noted: 2023-11-28

## 2023-11-28 PROBLEM — D22.71 MELANOCYTIC NEVI OF RIGHT LOWER LIMB, INCLUDING HIP: Status: ACTIVE | Noted: 2023-11-28

## 2023-11-28 PROBLEM — D18.01 HEMANGIOMA OF SKIN AND SUBCUTANEOUS TISSUE: Status: ACTIVE | Noted: 2023-11-28

## 2023-11-28 PROBLEM — L24.9 IRRITANT CONTACT DERMATITIS, UNSPECIFIED CAUSE: Status: ACTIVE | Noted: 2023-11-28

## 2023-11-28 PROCEDURE — OTHER LIQUID NITROGEN: OTHER

## 2023-11-28 PROCEDURE — OTHER EDUCATIONAL RESOURCES PROVIDED: OTHER

## 2023-11-28 PROCEDURE — OTHER MIPS QUALITY: OTHER

## 2023-11-28 PROCEDURE — 17000 DESTRUCT PREMALG LESION: CPT

## 2023-11-28 PROCEDURE — 99213 OFFICE O/P EST LOW 20 MIN: CPT | Mod: 25

## 2023-11-28 PROCEDURE — OTHER ADDITIONAL NOTES: OTHER

## 2023-11-28 PROCEDURE — 17003 DESTRUCT PREMALG LES 2-14: CPT

## 2023-11-28 PROCEDURE — OTHER COUNSELING: OTHER

## 2023-11-28 ASSESSMENT — LOCATION DETAILED DESCRIPTION DERM
LOCATION DETAILED: LEFT ANTERIOR DISTAL THIGH
LOCATION DETAILED: RIGHT DISTAL PRETIBIAL REGION
LOCATION DETAILED: RIGHT LATERAL ABDOMEN
LOCATION DETAILED: RIGHT INFERIOR FOREHEAD
LOCATION DETAILED: INFERIOR THORACIC SPINE
LOCATION DETAILED: LEFT VENTRAL DISTAL FOREARM
LOCATION DETAILED: LEFT INFERIOR LATERAL MIDBACK
LOCATION DETAILED: LEFT MEDIAL UPPER BACK
LOCATION DETAILED: SUPERIOR THORACIC SPINE
LOCATION DETAILED: LEFT ANTECUBITAL SKIN
LOCATION DETAILED: LEFT VENTRAL PROXIMAL FOREARM
LOCATION DETAILED: RIGHT VENTRAL PROXIMAL FOREARM
LOCATION DETAILED: LEFT DISTAL PRETIBIAL REGION
LOCATION DETAILED: RIGHT ANTERIOR DISTAL THIGH
LOCATION DETAILED: RIGHT SUPERIOR FOREHEAD
LOCATION DETAILED: RIGHT VENTRAL DISTAL FOREARM
LOCATION DETAILED: LEFT LATERAL ABDOMEN
LOCATION DETAILED: LEFT INFERIOR CENTRAL MALAR CHEEK
LOCATION DETAILED: RIGHT SUPERIOR OCCIPITAL SCALP

## 2023-11-28 ASSESSMENT — LOCATION ZONE DERM
LOCATION ZONE: TRUNK
LOCATION ZONE: SCALP
LOCATION ZONE: FACE
LOCATION ZONE: ARM
LOCATION ZONE: LEG

## 2023-11-28 ASSESSMENT — LOCATION SIMPLE DESCRIPTION DERM
LOCATION SIMPLE: LEFT UPPER ARM
LOCATION SIMPLE: ABDOMEN
LOCATION SIMPLE: LEFT THIGH
LOCATION SIMPLE: LEFT LOWER BACK
LOCATION SIMPLE: RIGHT FOREARM
LOCATION SIMPLE: LEFT PRETIBIAL REGION
LOCATION SIMPLE: LEFT CHEEK
LOCATION SIMPLE: LEFT UPPER BACK
LOCATION SIMPLE: RIGHT THIGH
LOCATION SIMPLE: RIGHT FOREHEAD
LOCATION SIMPLE: LEFT FOREARM
LOCATION SIMPLE: RIGHT OCCIPITAL SCALP
LOCATION SIMPLE: RIGHT PRETIBIAL REGION
LOCATION SIMPLE: UPPER BACK

## 2023-11-28 NOTE — PROCEDURE: ADDITIONAL NOTES
Additional Notes: - recommend not to use soap on legs and moisturizer frequently. Gave sample of Eucerin Eczema Relief
Detail Level: Simple
Render Risk Assessment In Note?: no
Additional Notes: - recommend to tuck shirt into pants to avoid irritation from belt

## 2023-11-28 NOTE — PROCEDURE: LIQUID NITROGEN
Render Note In Bullet Format When Appropriate: No
Detail Level: Simple
Duration Of Freeze Thaw-Cycle (Seconds): 3
Consent: The patient's consent was obtained including but not limited to risks of crusting, scabbing, blistering, scarring, darker or lighter pigmentary change, recurrence, incomplete removal and infection.
Application Tool (Optional): Liquid Nitrogen Sprayer
Number Of Freeze-Thaw Cycles: 1 freeze-thaw cycle
Post-Care Instructions: I reviewed with the patient in detail post-care instructions. Patient is to wear sunprotection, and avoid picking at any of the treated lesions. Pt may apply Vaseline to crusted or scabbing areas.
Show Aperture Variable?: Yes

## 2024-01-25 ENCOUNTER — OFFICE VISIT (OUTPATIENT)
Dept: FAMILY MEDICINE | Facility: CLINIC | Age: 89
End: 2024-01-25
Payer: COMMERCIAL

## 2024-01-25 ENCOUNTER — ANCILLARY PROCEDURE (OUTPATIENT)
Dept: GENERAL RADIOLOGY | Facility: CLINIC | Age: 89
End: 2024-01-25
Attending: FAMILY MEDICINE
Payer: COMMERCIAL

## 2024-01-25 VITALS
WEIGHT: 169.2 LBS | SYSTOLIC BLOOD PRESSURE: 104 MMHG | TEMPERATURE: 98.1 F | BODY MASS INDEX: 28.19 KG/M2 | DIASTOLIC BLOOD PRESSURE: 63 MMHG | RESPIRATION RATE: 16 BRPM | HEIGHT: 65 IN | HEART RATE: 64 BPM | OXYGEN SATURATION: 97 %

## 2024-01-25 DIAGNOSIS — N18.31 CHRONIC KIDNEY DISEASE, STAGE 3A (H): ICD-10-CM

## 2024-01-25 DIAGNOSIS — R51.9 SINUS HEADACHE: ICD-10-CM

## 2024-01-25 DIAGNOSIS — R51.9 SINUS HEADACHE: Primary | ICD-10-CM

## 2024-01-25 PROCEDURE — 70220 X-RAY EXAM OF SINUSES: CPT | Mod: TC | Performed by: RADIOLOGY

## 2024-01-25 PROCEDURE — 99214 OFFICE O/P EST MOD 30 MIN: CPT | Performed by: FAMILY MEDICINE

## 2024-01-25 RX ORDER — MONTELUKAST SODIUM 10 MG/1
10 TABLET ORAL AT BEDTIME
Qty: 30 TABLET | Refills: 1 | Status: SHIPPED | OUTPATIENT
Start: 2024-01-25 | End: 2024-02-14

## 2024-01-25 RX ORDER — FLUTICASONE PROPIONATE 50 MCG
1 SPRAY, SUSPENSION (ML) NASAL DAILY
Qty: 9.9 ML | Refills: 1 | Status: SHIPPED | OUTPATIENT
Start: 2024-01-25 | End: 2024-05-09

## 2024-01-25 RX ORDER — FLUTICASONE PROPIONATE 50 MCG
1 SPRAY, SUSPENSION (ML) NASAL DAILY
COMMUNITY
Start: 2024-01-25 | End: 2024-02-14

## 2024-01-25 RX ORDER — LORATADINE 10 MG/1
10 TABLET ORAL DAILY
Qty: 30 TABLET | Refills: 3 | Status: SHIPPED | OUTPATIENT
Start: 2024-01-25 | End: 2024-01-25

## 2024-01-25 RX ORDER — MONTELUKAST SODIUM 10 MG/1
1 TABLET ORAL AT BEDTIME
Qty: 90 TABLET | OUTPATIENT
Start: 2024-01-25

## 2024-01-25 ASSESSMENT — PAIN SCALES - GENERAL: PAINLEVEL: NO PAIN (1)

## 2024-01-25 NOTE — PROGRESS NOTES
"  Assessment & Plan     Sinus headache  Plan:- XR Sinus Complete G/E 3 Views; clear.  Symptomatic treatment with good hydration.   OTC acetaminophen as needed he has tried Claritin in past- it helps with sinus congestion but caused urinary side effects.    -humidifier, steam inhalation, good hydration    -steroid nasal spray  - fluticasone (FLONASE) 50 MCG/ACT nasal spray; Spray 1 spray into both nostrils daily  - montelukast (SINGULAIR) 10 MG tablet; Take 1 tablet (10 mg) by mouth at bedtime  Potential medication side effects were discussed with the patient; let me know if any occur.      Consider ENT in futured if recurring sinus headache  - Adult ENT  Referral; Future    Chronic kidney disease, stage 3a (H)  No acute concerns        Prescription drug management  I spent a total of 34 minutes on the day of the visit.   Time spent by me doing chart review, history and exam, documentation and further activities per the note          Tracy Ramos is a 88 year old, presenting for the following health issues:  Headache    History of Present Illness       Headaches:   Since the patient's last clinic visit, headaches are: worsened  The patient is getting headaches:  Every few days  He is not able to do normal daily activities when he has a migraine.  The patient is taking the following rescue/relief medications:  Tylenol   Patient states \"The relief is inconsistent\" from the rescue/relief medications.   The patient is taking the following medications to prevent migraines:  No medications to prevent migraines  In the past 4 weeks, the patient has gone to an Urgent Care or Emergency Room 0 times times due to headaches.    He eats 2-3 servings of fruits and vegetables daily.He consumes 1 sweetened beverage(s) daily.He exercises with enough effort to increase his heart rate 10 to 19 minutes per day.  He exercises with enough effort to increase his heart rate 5 days per week. He is missing 6 dose(s) of " "medications per week.   Headache associated with sniffles, post nasal dripping, or when bend over on and off  Headache is dull pain all over face , sinus and generalized.  Headache is not associated with vision or balance change.  Lately Cyclical headache and with sneezing & headache resolves and then it recurs .used -Over the counter claritin and flonase time to time   In past was given antibiotic but none for a while   Good relief with Augmentin in 8/20;    Had CT scan of  sinuses neg in 2/21.   (Reviewed from  scan reports )  History of asthma in childhood  ENT prescribed antihistamine in remote past  Good relief with Augmentin in 8/20;   Ct sinuses neg in 2/21.           Review of Systems  Constitutional, neuro, ENT, endocrine, pulmonary, cardiac, gastrointestinal, genitourinary, musculoskeletal, integument and psychiatric systems are negative, except as otherwise noted.      Objective    /63   Pulse 64   Temp 98.1  F (36.7  C) (Temporal)   Resp 16   Ht 1.638 m (5' 4.5\")   Wt 76.7 kg (169 lb 3.2 oz)   SpO2 97%   BMI 28.59 kg/m    Body mass index is 28.59 kg/m .  Physical Exam   GENERAL: alert and no distress  HENT: normal cephalic/atraumatic,hearing aide  ear canals and TM's normal, nasal mucosa edematous , rhinorrhea clear, oropharynx clear, and oral mucous membranes moist  NECK: no adenopathy, no asymmetry, masses, or scars  RESP: lungs clear to auscultation - no rales, rhonchi or wheezes  CV: regular rate and rhythm, normal S1 S2, no S3 or S4, no murmur, click or rub, no peripheral edema  ABDOMEN: soft, nontender, no hepatosplenomegaly, no masses and bowel sounds normal  NEURO: Normal strength and tone, mentation intact and speech normal  PSYCH: mentation appears normal, affect normal/bright    No results found for this or any previous visit (from the past 24 hour(s)).        Signed Electronically by: Radhika Fields MD    "

## 2024-01-25 NOTE — PATIENT INSTRUCTIONS
Sinus headache    - XR Sinus Complete G/E 3 Views; Future    -humidifier, steam inhalation, good hydration    -steroid nasal spray    -  - montelukast (SINGULAIR) 10 MG tablet; Take 1 tablet (10 mg) by mouth at bedtime  Dispense: 30 tablet; Refill: 1    - Adult ENT  Referral; Future

## 2024-02-13 ENCOUNTER — NURSE TRIAGE (OUTPATIENT)
Dept: FAMILY MEDICINE | Facility: CLINIC | Age: 89
End: 2024-02-13
Payer: COMMERCIAL

## 2024-02-13 NOTE — TELEPHONE ENCOUNTER
Patient calling  Had OV 1/25/24 for headache, sinus congestion  Sinus and chest congestion have resolved  But continued headaches  Currently pain rated 6/10  Denies double or loss of vision, fever  But reports feeling of eye straining while reading  Scheduled for VV tomorrow  Will call back if new or worsening symptoms in the mean time  Louisa GARCIA RN      Reason for Disposition   Unexplained headache that is present > 24 hours    Additional Information   Negative: Difficult to awaken or acting confused (e.g., disoriented, slurred speech)   Negative: Weakness of the face, arm or leg on one side of the body and new-onset   Negative: Numbness of the face, arm or leg on one side of the body and new-onset   Negative: Loss of speech or garbled speech and new-onset   Negative: Passed out (i.e., fainted, collapsed and was not responding)   Negative: Sounds like a life-threatening emergency to the triager   Negative: Followed a head injury within last 3 days   Negative: Traumatic Brain Injury (TBI) is suspected   Negative: Sinus pain of forehead and yellow or green nasal discharge   Negative: Pregnant   Negative: Unable to walk without falling   Negative: Stiff neck (can't touch chin to chest)   Negative: Possibility of carbon monoxide exposure   Negative: SEVERE headache, states 'worst headache' of life   Negative: SEVERE headache, sudden-onset (i.e., reaching maximum intensity within seconds to 1 hour)   Negative: Severe pain in one eye   Negative: Loss of vision or double vision  (Exception: Same as prior migraines.)   Negative: Patient sounds very sick or weak to the triager   Negative: Fever > 103 F (39.4 C)   Negative: Fever > 100.0 F (37.8 C) and has diabetes mellitus or a weak immune system (e.g., HIV positive, cancer chemotherapy, organ transplant, splenectomy, chronic steroids)   Negative: SEVERE headache (e.g., excruciating) and has had severe headaches before   Negative: SEVERE headache and not relieved by pain  meds   Negative: SEVERE headache and vomiting   Negative: SEVERE headache and fever   Negative: New headache and weak immune system (e.g., HIV positive, cancer chemo, splenectomy, organ transplant, chronic steroids)   Negative: Fever present > 3 days (72 hours)   Negative: Patient wants to be seen    Protocols used: Headache-A-OH

## 2024-02-14 ENCOUNTER — VIRTUAL VISIT (OUTPATIENT)
Dept: FAMILY MEDICINE | Facility: CLINIC | Age: 89
End: 2024-02-14
Payer: COMMERCIAL

## 2024-02-14 DIAGNOSIS — G44.219 EPISODIC TENSION-TYPE HEADACHE, NOT INTRACTABLE: Primary | ICD-10-CM

## 2024-02-14 PROCEDURE — 99441 PR PHYSICIAN TELEPHONE EVALUATION 5-10 MIN: CPT | Mod: 93 | Performed by: FAMILY MEDICINE

## 2024-02-14 ASSESSMENT — ENCOUNTER SYMPTOMS: HEADACHES: 1

## 2024-02-14 NOTE — PATIENT INSTRUCTIONS
1. Episodic tension-type headache, not intractable  Plan:stay well hydrated.  Ok to take over the counter tylenol 1-2 tabs once daily as needed  .  Comprehensive eye exam- given history of partial dry macular degeneration symptoms.  Headache diary to assess the frequency of the headache & avoid triggers if identified    Sinus xray clear  Ok to stop nasal spray as now clear nasal passages.  Ok to stop singular,and start both or either as needed  for more specific nasal congestion, with or without sinus headache   Follow up with primary care physicain in 4 weeks to review concerns, earlier as needed  with avaliable provider

## 2024-02-14 NOTE — PROGRESS NOTES
"Richard is a 88 year old who is being evaluated via a billable video visit.      How would you like to obtain your AVS? MyChart  If the video visit is dropped, the invitation should be resent by: Send to e-mail at: [email or web address]  Will anyone else be joining your video visit? No  {If patient encounters technical issues they should call [phone] :[id number]}        {PROVIDER CHARTING PREFERENCE:870647}    Subjective   Richard is a 88 year old, presenting for the following health issues:  No chief complaint on file.  {(!) Visit Details have not yet been documented.  Please enter Visit Details and then use this list to pull in documentation. (Optional):013790}  Headache     History of Present Illness       Headaches:   Since the patient's last clinic visit, headaches are: worsened  The patient is getting headaches:  Approximately daily  He is able to do normal daily activities when he has a migraine.  The patient is taking the following rescue/relief medications:  No rescue/relief medications   Patient states \"I get no relief\" from the rescue/relief medications.   The patient is taking the following medications to prevent migraines:  No medications to prevent migraines  In the past 4 weeks, the patient has gone to an Urgent Care or Emergency Room 0 times times due to headaches.    He eats 2-3 servings of fruits and vegetables daily.He consumes 1 sweetened beverage(s) daily.He exercises with enough effort to increase his heart rate 10 to 19 minutes per day.  He exercises with enough effort to increase his heart rate 6 days per week.   He is taking medications regularly.       {SUPERLIST (Optional):649257}  {additonal problems for provider to add (Optional):756754}    {ROS Picklists (Optional):635334}      Objective           Vitals:  No vitals were obtained today due to virtual visit.    Physical Exam   {video visit exam brief selected:564100}    {Diagnostic Test Results (Optional):169397}      Video-Visit " "Details    Type of service:  Video Visit   Video Start Time: {video visit start/end time for provider to select:567357}  Video End Time:{video visit start/end time for provider to select:498190}    Originating Location (pt. Location): {video visit patient location:341227::\"Home\"}  {PROVIDER LOCATION On-site should be selected for visits conducted from your clinic location or adjoining [facility] hospital, academic office, or other nearby [facility] building. Off-site should be selected for all other provider locations, including home:613318}  Distant Location (provider location):  {virtual location provider:456450}  Platform used for Video Visit: {Virtual Visit Platforms:242505::\"[employer]\"}  Signed Electronically by: Radhika Fields MD  {Email feedback regarding this note to [email or web address]   :316344}  "

## 2024-02-14 NOTE — PROGRESS NOTES
Richard is a 88 year old who is being evaluated via a billable telephone visit.      What phone number would you like to be contacted at? [phone]   How would you like to obtain your AVS? Sheila    Distant Location (provider location):  On-site    Assessment & Plan       1. Episodic tension-type headache, not intractable  Plan:stay well hydrated.  Ok to take over the counter tylenol 1-2 tabs once daily as needed  .  Comprehensive eye exam- given history of partial dry macular degeneration symptoms.  Headache diary to assess the frequency of the headache & avoid triggers if identified    Sinus xray clear  Ok to stop nasal spray as now clear nasal passages.  Ok to stop singular,and start both or either as needed  for more specific nasal congestion, with or without sinus headache   Follow up with primary care physicain in 4 weeks to review concerns, earlier as needed  with avaliable provider      We talked about future imaging of brain- if headache sever or longer.    I spent a total of 20 minutes on the day of the visit.   Time spent by me doing chart review, history and exam, documentation and further activities per the note            Subjective   Richard is a 88 year old, presenting for the following health issues:  Headache  Took flonase and singular  Feels that sinus/ nasal passage clear.  Wonders if ok to stop medications  Wonder if they are causing headache, sides of the head, sometimes behind the head or on the top  Other times on the vertex.  Has to strain more while reading  Worried as reading is necessary due to tax season.  Has seen ophthalmologist- last year and was told no worried, except for dry macular degeneration.    Pain is mild to moderate  Has not taken any medications  Is not associated with balance or vision loss ,no numbness .  Worried if has brain cancer.    No fever. No neck stiffness    Headache     History of Present Illness       Headaches:   Since the patient's last clinic visit, headaches  "are: worsened  The patient is getting headaches:  Approximately daily  He is able to do normal daily activities when he has a migraine.  The patient is taking the following rescue/relief medications:  No rescue/relief medications   Patient states \"I get no relief\" from the rescue/relief medications.   The patient is taking the following medications to prevent migraines:  No medications to prevent migraines  In the past 4 weeks, the patient has gone to an Urgent Care or Emergency Room 0 times times due to headaches.    He eats 2-3 servings of fruits and vegetables daily.He consumes 1 sweetened beverage(s) daily.He exercises with enough effort to increase his heart rate 10 to 19 minutes per day.  He exercises with enough effort to increase his heart rate 6 days per week.   He is taking medications regularly.             Review of Systems  Constitutional, neuro, ENT, endocrine, pulmonary, cardiac, gastrointestinal, genitourinary, musculoskeletal, integument and psychiatric systems are negative, except as otherwise noted.      Objective           Vitals:  No vitals were obtained today due to virtual visit.    Physical Exam   General: Alert and no distress //Respiratory: No audible wheeze, cough, or shortness of breath // Psychiatric:  Appropriate affect, tone, and pace of words            Phone call duration: 10 minutes  Signed Electronically by: Radhika Fields MD    "

## 2024-02-26 DIAGNOSIS — N48.1 BALANITIS: ICD-10-CM

## 2024-02-26 RX ORDER — CLOTRIMAZOLE 1 %
CREAM (GRAM) TOPICAL 2 TIMES DAILY
Qty: 30 G | Refills: 4 | Status: SHIPPED | OUTPATIENT
Start: 2024-02-26

## 2024-02-26 NOTE — TELEPHONE ENCOUNTER
JW,  Patient requesting refill for medication.  States issue is back- does not really want to discuss with RN.  Would like refill sent in.  Erma BARKER RN

## 2024-05-09 ENCOUNTER — OFFICE VISIT (OUTPATIENT)
Dept: FAMILY MEDICINE | Facility: CLINIC | Age: 89
End: 2024-05-09
Payer: COMMERCIAL

## 2024-05-09 VITALS
HEIGHT: 65 IN | BODY MASS INDEX: 27.42 KG/M2 | HEART RATE: 63 BPM | TEMPERATURE: 97.3 F | DIASTOLIC BLOOD PRESSURE: 56 MMHG | WEIGHT: 164.56 LBS | SYSTOLIC BLOOD PRESSURE: 101 MMHG | RESPIRATION RATE: 16 BRPM | OXYGEN SATURATION: 97 %

## 2024-05-09 DIAGNOSIS — R51.9 SINUS HEADACHE: Primary | ICD-10-CM

## 2024-05-09 DIAGNOSIS — B35.6 JOCK ITCH: ICD-10-CM

## 2024-05-09 DIAGNOSIS — R22.1 NECK MASS: ICD-10-CM

## 2024-05-09 DIAGNOSIS — N48.1 BALANITIS: ICD-10-CM

## 2024-05-09 PROCEDURE — 99214 OFFICE O/P EST MOD 30 MIN: CPT | Performed by: FAMILY MEDICINE

## 2024-05-09 RX ORDER — RESPIRATORY SYNCYTIAL VIRUS VACCINE 120MCG/0.5
0.5 KIT INTRAMUSCULAR ONCE
Qty: 1 EACH | Refills: 0 | Status: CANCELLED | OUTPATIENT
Start: 2024-05-09 | End: 2024-05-09

## 2024-05-09 RX ORDER — FLUCONAZOLE 150 MG/1
150 TABLET ORAL
Qty: 3 TABLET | Refills: 0 | Status: SHIPPED | OUTPATIENT
Start: 2024-05-09 | End: 2024-05-16

## 2024-05-09 RX ORDER — FEXOFENADINE HCL 180 MG/1
180 TABLET ORAL DAILY
Qty: 30 TABLET | Refills: 1 | Status: SHIPPED | OUTPATIENT
Start: 2024-05-09

## 2024-05-09 RX ORDER — FLUTICASONE PROPIONATE 50 MCG
2 SPRAY, SUSPENSION (ML) NASAL DAILY
Qty: 9.9 ML | Refills: 1 | Status: SHIPPED | OUTPATIENT
Start: 2024-05-09

## 2024-05-09 RX ORDER — TRIAMCINOLONE ACETONIDE 1 MG/G
OINTMENT TOPICAL 2 TIMES DAILY
Qty: 30 G | Refills: 1 | Status: SHIPPED | OUTPATIENT
Start: 2024-05-09

## 2024-05-09 ASSESSMENT — ASTHMA QUESTIONNAIRES
QUESTION_3 LAST FOUR WEEKS HOW OFTEN DID YOUR ASTHMA SYMPTOMS (WHEEZING, COUGHING, SHORTNESS OF BREATH, CHEST TIGHTNESS OR PAIN) WAKE YOU UP AT NIGHT OR EARLIER THAN USUAL IN THE MORNING: NOT AT ALL
QUESTION_4 LAST FOUR WEEKS HOW OFTEN HAVE YOU USED YOUR RESCUE INHALER OR NEBULIZER MEDICATION (SUCH AS ALBUTEROL): NOT AT ALL
QUESTION_5 LAST FOUR WEEKS HOW WOULD YOU RATE YOUR ASTHMA CONTROL: WELL CONTROLLED
ACT_TOTALSCORE: 24
QUESTION_1 LAST FOUR WEEKS HOW MUCH OF THE TIME DID YOUR ASTHMA KEEP YOU FROM GETTING AS MUCH DONE AT WORK, SCHOOL OR AT HOME: NONE OF THE TIME
ACT_TOTALSCORE: 24
QUESTION_2 LAST FOUR WEEKS HOW OFTEN HAVE YOU HAD SHORTNESS OF BREATH: NOT AT ALL

## 2024-05-09 ASSESSMENT — ENCOUNTER SYMPTOMS
COUGH: 1
HEADACHES: 1

## 2024-05-09 ASSESSMENT — PAIN SCALES - GENERAL: PAINLEVEL: NO PAIN (0)

## 2024-05-09 NOTE — PATIENT INSTRUCTIONS
Balanitis:     - take fluconazole 150mg daily for three days.  - use triamcinolone ointment on glans penis twice daily for two weeks  - assuming improved at that time in two weeks then stop using triamcinolone and use miconazole powder for prevention    Jock itch:  much improved already with topical clotrimazole you have been using.   -take the fluconazole daily for three days as above.   - use miconazole powder daily to keep dry for prevention    Neck lymph node/mass:  resolved based on careful exam today.     Headache:  overall appears to be allergy related with sneezing, mild cough ,facial pressure.   Recommend:  - two sprays fluticasone nasal spray daily for ONE MONTH  - fexofenadine 180mg daily (allegra) for one month.   - stop montelukast (singulair)   - stick with these for one full month  as they take time to work.   -if not improving then would recommend sumatriptan (migraine medicine) trial.    Include Z78.9 (Other Specified Conditions Influencing Health Status) As An Associated Diagnosis?: No

## 2024-05-09 NOTE — PROGRESS NOTES
Assessment & Plan       Balanitis:     - take fluconazole 150mg daily for three days.  - use triamcinolone ointment on glans penis twice daily for two weeks  - assuming improved at that time in two weeks then stop using triamcinolone and use miconazole powder for prevention    Jock itch:  much improved already with topical clotrimazole you have been using.   -take the fluconazole daily for three days as above.   - use miconazole powder daily to keep dry for prevention    Neck lymph node/mass:  resolved based on careful exam today.     Headache:  overall appears to be allergy related with sneezing, mild cough ,facial pressure.   Recommend:  - two sprays fluticasone nasal spray daily for ONE MONTH  - fexofenadine 180mg daily (allegra) for one month.   - stop montelukast (singulair)   - stick with these for one full month  as they take time to work.   -if not improving then would recommend sumatriptan (migraine medicine) trial.   Sinus headache    - fexofenadine (ALLEGRA) 180 MG tablet; Take 1 tablet (180 mg) by mouth daily  - fluticasone (FLONASE) 50 MCG/ACT nasal spray; Spray 2 sprays into both nostrils daily    Balanitis    - fluconazole (DIFLUCAN) 150 MG tablet; Take 1 tablet (150 mg) by mouth every 3 days for 3 doses  - triamcinolone (KENALOG) 0.1 % external ointment; Apply topically 2 times daily    Jock itch    - fluconazole (DIFLUCAN) 150 MG tablet; Take 1 tablet (150 mg) by mouth every 3 days for 3 doses    Neck mass      The longitudinal plan of care for the diagnosis(es)/condition(s) as documented were addressed during this visit. Due to the added complexity in care, I will continue to support Don in the subsequent management and with ongoing continuity of care.    35 minutes spent by me on the date of the encounter doing chart review, history and exam, documentation and further activities per the note      BMI  Estimated body mass index is 27.38 kg/m  as calculated from the following:    Height as of this  "encounter: 1.651 m (5' 5\").    Weight as of this encounter: 74.6 kg (164 lb 9 oz).             Subjective   Richard is a 88 year old, presenting for the following health issues:  Headache, Cough (Phlegm issues for 1-2 years), and Derm Problem (Rash groin back)      5/9/2024    11:18 AM   Additional Questions   Roomed by anne Sharma     Cough  Associated symptoms include headaches.   History of Present Illness       Headaches:   Since the patient's last clinic visit, headaches are: no change  The patient is getting headaches:  Every few days  He is able to do normal daily activities when he has a migraine.  The patient is taking the following rescue/relief medications:  No rescue/relief medications and other   Patient states \"I get some relief\" from the rescue/relief medications.   The patient is taking the following medications to prevent migraines:  No medications to prevent migraines  In the past 4 weeks, the patient has gone to an Urgent Care or Emergency Room 0 times times due to headaches.    Reason for visit:  To get medical advice    He eats 2-3 servings of fruits and vegetables daily.He consumes 1 sweetened beverage(s) daily.He exercises with enough effort to increase his heart rate 10 to 19 minutes per day.  He exercises with enough effort to increase his heart rate 5 days per week.   He is taking medications regularly.                     Objective    Ht 1.651 m (5' 5\")   Wt 74.6 kg (164 lb 9 oz)   BMI 27.38 kg/m    Body mass index is 27.38 kg/m .  Physical Exam   Careful oral and neck exam revealed no significant lymphadenopathy or mass in jaw, mouth, neck or supraclavicular area  No m/g/rubs  Lungs clear.             Signed Electronically by: Joel Daniel Wegener, MD    "

## 2024-05-28 ENCOUNTER — APPOINTMENT (OUTPATIENT)
Dept: URBAN - METROPOLITAN AREA CLINIC 256 | Age: 89
Setting detail: DERMATOLOGY
End: 2024-05-28

## 2024-05-28 VITALS — HEIGHT: 65 IN | WEIGHT: 150 LBS

## 2024-05-28 DIAGNOSIS — B37.42 CANDIDAL BALANITIS: ICD-10-CM

## 2024-05-28 DIAGNOSIS — D18.0 HEMANGIOMA: ICD-10-CM

## 2024-05-28 DIAGNOSIS — L57.8 OTHER SKIN CHANGES DUE TO CHRONIC EXPOSURE TO NONIONIZING RADIATION: ICD-10-CM

## 2024-05-28 DIAGNOSIS — L57.0 ACTINIC KERATOSIS: ICD-10-CM

## 2024-05-28 DIAGNOSIS — L82.1 OTHER SEBORRHEIC KERATOSIS: ICD-10-CM

## 2024-05-28 DIAGNOSIS — Z71.89 OTHER SPECIFIED COUNSELING: ICD-10-CM

## 2024-05-28 DIAGNOSIS — D22 MELANOCYTIC NEVI: ICD-10-CM

## 2024-05-28 DIAGNOSIS — L30.4 ERYTHEMA INTERTRIGO: ICD-10-CM

## 2024-05-28 PROBLEM — D22.5 MELANOCYTIC NEVI OF TRUNK: Status: ACTIVE | Noted: 2024-05-28

## 2024-05-28 PROBLEM — D18.01 HEMANGIOMA OF SKIN AND SUBCUTANEOUS TISSUE: Status: ACTIVE | Noted: 2024-05-28

## 2024-05-28 PROBLEM — D22.72 MELANOCYTIC NEVI OF LEFT LOWER LIMB, INCLUDING HIP: Status: ACTIVE | Noted: 2024-05-28

## 2024-05-28 PROBLEM — D22.62 MELANOCYTIC NEVI OF LEFT UPPER LIMB, INCLUDING SHOULDER: Status: ACTIVE | Noted: 2024-05-28

## 2024-05-28 PROBLEM — D22.61 MELANOCYTIC NEVI OF RIGHT UPPER LIMB, INCLUDING SHOULDER: Status: ACTIVE | Noted: 2024-05-28

## 2024-05-28 PROBLEM — D22.71 MELANOCYTIC NEVI OF RIGHT LOWER LIMB, INCLUDING HIP: Status: ACTIVE | Noted: 2024-05-28

## 2024-05-28 PROCEDURE — OTHER LIQUID NITROGEN: OTHER

## 2024-05-28 PROCEDURE — OTHER ADDITIONAL NOTES: OTHER

## 2024-05-28 PROCEDURE — 99214 OFFICE O/P EST MOD 30 MIN: CPT | Mod: 25

## 2024-05-28 PROCEDURE — 17000 DESTRUCT PREMALG LESION: CPT

## 2024-05-28 PROCEDURE — OTHER PRESCRIPTION: OTHER

## 2024-05-28 PROCEDURE — OTHER COUNSELING: OTHER

## 2024-05-28 PROCEDURE — OTHER MIPS QUALITY: OTHER

## 2024-05-28 PROCEDURE — OTHER PRESCRIPTION MEDICATION MANAGEMENT: OTHER

## 2024-05-28 RX ORDER — KETOCONAZOLE 20 MG/G
CREAM TOPICAL
Qty: 60 | Refills: 0 | Status: ERX | COMMUNITY
Start: 2024-05-28

## 2024-05-28 ASSESSMENT — LOCATION DETAILED DESCRIPTION DERM
LOCATION DETAILED: LEFT INFERIOR CENTRAL MALAR CHEEK
LOCATION DETAILED: INFERIOR THORACIC SPINE
LOCATION DETAILED: LEFT VENTRAL DISTAL FOREARM
LOCATION DETAILED: RIGHT ANTERIOR DISTAL THIGH
LOCATION DETAILED: LEFT VENTRAL PROXIMAL FOREARM
LOCATION DETAILED: LEFT INFERIOR LATERAL MIDBACK
LOCATION DETAILED: LEFT ANTERIOR DISTAL THIGH
LOCATION DETAILED: LEFT MEDIAL UPPER BACK
LOCATION DETAILED: LEFT ANTECUBITAL SKIN
LOCATION DETAILED: RIGHT VENTRAL PROXIMAL FOREARM
LOCATION DETAILED: RIGHT MEDIAL FRONTAL SCALP
LOCATION DETAILED: SUPERIOR THORACIC SPINE
LOCATION DETAILED: RIGHT VENTRAL DISTAL FOREARM

## 2024-05-28 ASSESSMENT — LOCATION SIMPLE DESCRIPTION DERM
LOCATION SIMPLE: LEFT UPPER ARM
LOCATION SIMPLE: UPPER BACK
LOCATION SIMPLE: RIGHT SCALP
LOCATION SIMPLE: LEFT UPPER BACK
LOCATION SIMPLE: LEFT LOWER BACK
LOCATION SIMPLE: RIGHT FOREARM
LOCATION SIMPLE: RIGHT THIGH
LOCATION SIMPLE: LEFT CHEEK
LOCATION SIMPLE: LEFT FOREARM
LOCATION SIMPLE: LEFT THIGH

## 2024-05-28 ASSESSMENT — LOCATION ZONE DERM
LOCATION ZONE: SCALP
LOCATION ZONE: ARM
LOCATION ZONE: LEG
LOCATION ZONE: TRUNK
LOCATION ZONE: FACE

## 2024-05-28 NOTE — PROCEDURE: ADDITIONAL NOTES
Render Risk Assessment In Note?: no
Additional Notes: Discussed with patient topical steroids are not appropriate for this condition and to change to an anti-fungal.
Detail Level: Simple

## 2024-05-28 NOTE — PROCEDURE: PRESCRIPTION MEDICATION MANAGEMENT
Detail Level: Zone
Render In Strict Bullet Format?: No
Discontinue Regimen: Triamcinolone ointment
Initiate Treatment: Ketoconazole cream 2% BID

## 2024-05-28 NOTE — PROCEDURE: LIQUID NITROGEN
Application Tool (Optional): Liquid Nitrogen Sprayer
Consent: The patient's consent was obtained including but not limited to risks of crusting, scabbing, blistering, scarring, darker or lighter pigmentary change, recurrence, incomplete removal and infection.
Render Note In Bullet Format When Appropriate: No
Post-Care Instructions: I reviewed with the patient in detail post-care instructions. Patient is to wear sunprotection, and avoid picking at any of the treated lesions. Pt may apply Vaseline to crusted or scabbing areas.
Detail Level: Simple
Number Of Freeze-Thaw Cycles: 1 freeze-thaw cycle
Duration Of Freeze Thaw-Cycle (Seconds): 3
Show Applicator Variable?: Yes

## 2024-08-13 ENCOUNTER — PATIENT OUTREACH (OUTPATIENT)
Dept: CARE COORDINATION | Facility: CLINIC | Age: 89
End: 2024-08-13
Payer: COMMERCIAL

## 2024-08-27 ENCOUNTER — PATIENT OUTREACH (OUTPATIENT)
Dept: CARE COORDINATION | Facility: CLINIC | Age: 89
End: 2024-08-27
Payer: COMMERCIAL

## 2024-09-06 ENCOUNTER — TRANSFERRED RECORDS (OUTPATIENT)
Dept: HEALTH INFORMATION MANAGEMENT | Facility: CLINIC | Age: 89
End: 2024-09-06
Payer: COMMERCIAL

## 2024-10-10 ENCOUNTER — OFFICE VISIT (OUTPATIENT)
Dept: FAMILY MEDICINE | Facility: CLINIC | Age: 89
End: 2024-10-10
Payer: COMMERCIAL

## 2024-10-10 VITALS
WEIGHT: 166.4 LBS | DIASTOLIC BLOOD PRESSURE: 61 MMHG | SYSTOLIC BLOOD PRESSURE: 101 MMHG | HEART RATE: 63 BPM | RESPIRATION RATE: 18 BRPM | OXYGEN SATURATION: 97 % | BODY MASS INDEX: 27.72 KG/M2 | TEMPERATURE: 98.3 F | HEIGHT: 65 IN

## 2024-10-10 DIAGNOSIS — N18.31 CHRONIC KIDNEY DISEASE, STAGE 3A (H): ICD-10-CM

## 2024-10-10 DIAGNOSIS — N48.1 BALANITIS: ICD-10-CM

## 2024-10-10 DIAGNOSIS — E78.5 HYPERLIPIDEMIA, UNSPECIFIED HYPERLIPIDEMIA TYPE: ICD-10-CM

## 2024-10-10 DIAGNOSIS — B35.6 JOCK ITCH: ICD-10-CM

## 2024-10-10 DIAGNOSIS — Z00.00 ENCOUNTER FOR MEDICARE ANNUAL WELLNESS EXAM: Primary | ICD-10-CM

## 2024-10-10 DIAGNOSIS — Z12.5 SCREENING FOR PROSTATE CANCER: ICD-10-CM

## 2024-10-10 LAB — HGB BLD-MCNC: 13.1 G/DL (ref 13.3–17.7)

## 2024-10-10 PROCEDURE — 82570 ASSAY OF URINE CREATININE: CPT | Performed by: FAMILY MEDICINE

## 2024-10-10 PROCEDURE — 82043 UR ALBUMIN QUANTITATIVE: CPT | Performed by: FAMILY MEDICINE

## 2024-10-10 PROCEDURE — G0439 PPPS, SUBSEQ VISIT: HCPCS | Performed by: FAMILY MEDICINE

## 2024-10-10 PROCEDURE — 80048 BASIC METABOLIC PNL TOTAL CA: CPT | Performed by: FAMILY MEDICINE

## 2024-10-10 PROCEDURE — 80061 LIPID PANEL: CPT | Performed by: FAMILY MEDICINE

## 2024-10-10 PROCEDURE — 36415 COLL VENOUS BLD VENIPUNCTURE: CPT | Performed by: FAMILY MEDICINE

## 2024-10-10 PROCEDURE — G0103 PSA SCREENING: HCPCS | Performed by: FAMILY MEDICINE

## 2024-10-10 PROCEDURE — 85018 HEMOGLOBIN: CPT | Performed by: FAMILY MEDICINE

## 2024-10-10 RX ORDER — FLUCONAZOLE 150 MG/1
150 TABLET ORAL
Qty: 3 TABLET | Refills: 0 | Status: SHIPPED | OUTPATIENT
Start: 2024-10-10

## 2024-10-10 RX ORDER — CLOTRIMAZOLE 1 %
CREAM (GRAM) TOPICAL 2 TIMES DAILY
Qty: 30 G | Refills: 4 | Status: SHIPPED | OUTPATIENT
Start: 2024-10-10

## 2024-10-10 RX ORDER — TRIAMCINOLONE ACETONIDE 1 MG/G
OINTMENT TOPICAL 2 TIMES DAILY
Qty: 30 G | Refills: 1 | Status: SHIPPED | OUTPATIENT
Start: 2024-10-10

## 2024-10-10 SDOH — HEALTH STABILITY: PHYSICAL HEALTH: ON AVERAGE, HOW MANY DAYS PER WEEK DO YOU ENGAGE IN MODERATE TO STRENUOUS EXERCISE (LIKE A BRISK WALK)?: 5 DAYS

## 2024-10-10 ASSESSMENT — ANXIETY QUESTIONNAIRES
IF YOU CHECKED OFF ANY PROBLEMS ON THIS QUESTIONNAIRE, HOW DIFFICULT HAVE THESE PROBLEMS MADE IT FOR YOU TO DO YOUR WORK, TAKE CARE OF THINGS AT HOME, OR GET ALONG WITH OTHER PEOPLE: NOT DIFFICULT AT ALL
8. IF YOU CHECKED OFF ANY PROBLEMS, HOW DIFFICULT HAVE THESE MADE IT FOR YOU TO DO YOUR WORK, TAKE CARE OF THINGS AT HOME, OR GET ALONG WITH OTHER PEOPLE?: NOT DIFFICULT AT ALL
6. BECOMING EASILY ANNOYED OR IRRITABLE: SEVERAL DAYS
7. FEELING AFRAID AS IF SOMETHING AWFUL MIGHT HAPPEN: NOT AT ALL
GAD7 TOTAL SCORE: 2
1. FEELING NERVOUS, ANXIOUS, OR ON EDGE: NOT AT ALL
4. TROUBLE RELAXING: SEVERAL DAYS
5. BEING SO RESTLESS THAT IT IS HARD TO SIT STILL: NOT AT ALL
7. FEELING AFRAID AS IF SOMETHING AWFUL MIGHT HAPPEN: NOT AT ALL
GAD7 TOTAL SCORE: 2
3. WORRYING TOO MUCH ABOUT DIFFERENT THINGS: NOT AT ALL
2. NOT BEING ABLE TO STOP OR CONTROL WORRYING: NOT AT ALL
GAD7 TOTAL SCORE: 2

## 2024-10-10 ASSESSMENT — SOCIAL DETERMINANTS OF HEALTH (SDOH): HOW OFTEN DO YOU GET TOGETHER WITH FRIENDS OR RELATIVES?: THREE TIMES A WEEK

## 2024-10-10 ASSESSMENT — PAIN SCALES - GENERAL: PAINLEVEL: NO PAIN (0)

## 2024-10-10 ASSESSMENT — PATIENT HEALTH QUESTIONNAIRE - PHQ9
SUM OF ALL RESPONSES TO PHQ QUESTIONS 1-9: 2
SUM OF ALL RESPONSES TO PHQ QUESTIONS 1-9: 2
10. IF YOU CHECKED OFF ANY PROBLEMS, HOW DIFFICULT HAVE THESE PROBLEMS MADE IT FOR YOU TO DO YOUR WORK, TAKE CARE OF THINGS AT HOME, OR GET ALONG WITH OTHER PEOPLE: NOT DIFFICULT AT ALL

## 2024-10-10 NOTE — PATIENT INSTRUCTIONS
Rash on end of penis and in groin:   - take fluconazole 150mg daily for three days.  - use triamcinolone ointment on the areas twice daily for two weeks  - assuming improved at that time in two weeks then stop using triamcinolone and use miconazole powder after daily shower or bath for prevention     Recommend rsv from pharmacy          Patient Education   Preventive Care Advice   This is general advice given by our system to help you stay healthy. However, your care team may have specific advice just for you. Please talk to your care team about your preventive care needs.  Nutrition  Eat 5 or more servings of fruits and vegetables each day.  Try wheat bread, brown rice and whole grain pasta (instead of white bread, rice, and pasta).  Get enough calcium and vitamin D. Check the label on foods and aim for 100% of the RDA (recommended daily allowance).  Lifestyle  Exercise at least 150 minutes each week  (30 minutes a day, 5 days a week).  Do muscle strengthening activities 2 days a week. These help control your weight and prevent disease.  No smoking.  Wear sunscreen to prevent skin cancer.  Have a dental exam and cleaning every 6 months.  Yearly exams  See your health care team every year to talk about:  Any changes in your health.  Any medicines your care team has prescribed.  Preventive care, family planning, and ways to prevent chronic diseases.  Shots (vaccines)   HPV shots (up to age 26), if you've never had them before.  Hepatitis B shots (up to age 59), if you've never had them before.  COVID-19 shot: Get this shot when it's due.  Flu shot: Get a flu shot every year.  Tetanus shot: Get a tetanus shot every 10 years.  Pneumococcal, hepatitis A, and RSV shots: Ask your care team if you need these based on your risk.  Shingles shot (for age 50 and up)  General health tests  Diabetes screening:  Starting at age 35, Get screened for diabetes at least every 3 years.  If you are younger than age 35, ask your  care team if you should be screened for diabetes.  Cholesterol test: At age 39, start having a cholesterol test every 5 years, or more often if advised.  Bone density scan (DEXA): At age 50, ask your care team if you should have this scan for osteoporosis (brittle bones).  Hepatitis C: Get tested at least once in your life.  STIs (sexually transmitted infections)  Before age 24: Ask your care team if you should be screened for STIs.  After age 24: Get screened for STIs if you're at risk. You are at risk for STIs (including HIV) if:  You are sexually active with more than one person.  You don't use condoms every time.  You or a partner was diagnosed with a sexually transmitted infection.  If you are at risk for HIV, ask about PrEP medicine to prevent HIV.  Get tested for HIV at least once in your life, whether you are at risk for HIV or not.  Cancer screening tests  Cervical cancer screening: If you have a cervix, begin getting regular cervical cancer screening tests starting at age 21.  Breast cancer scan (mammogram): If you've ever had breasts, begin having regular mammograms starting at age 40. This is a scan to check for breast cancer.  Colon cancer screening: It is important to start screening for colon cancer at age 45.  Have a colonoscopy test every 10 years (or more often if you're at risk) Or, ask your provider about stool tests like a FIT test every year or Cologuard test every 3 years.  To learn more about your testing options, visit:   .  For help making a decision, visit:   https://bit.ly/sa15939.  Prostate cancer screening test: If you have a prostate, ask your care team if a prostate cancer screening test (PSA) at age 55 is right for you.  Lung cancer screening: If you are a current or former smoker ages 50 to 80, ask your care team if ongoing lung cancer screenings are right for you.  For informational purposes only. Not to replace the advice of your health care provider. Copyright   2023 [facility]. All rights reserved. Clinically reviewed by the [facility] Transitions Program. [employer] 777474 - REV 01/24.  Hearing Loss: Care Instructions  Overview     Hearing loss is a sudden or slow decrease in how well you hear. It can range from slight to profound. Permanent hearing loss can occur with aging. It also can happen when you are exposed long-term to loud noise. Examples include listening to loud music, riding motorcycles, or being around other loud machines.  Hearing loss can affect your work and home life. It can make you feel lonely or depressed. You may feel that you have lost your independence. But hearing aids and other devices can help you hear better and feel connected to others.  Follow-up care is a key part of your treatment and safety. Be sure to make and go to all appointments, and call your doctor if you are having problems. It's also a good idea to know your test results and keep a list of the medicines you take.  How can you care for yourself at home?  Avoid loud noises whenever possible. This helps keep your hearing from getting worse.  Always wear hearing protection around loud noises.  Wear a hearing aid as directed.  A professional can help you pick a hearing aid that will work best for you.  You can also get hearing aids over the counter for mild to moderate hearing loss.  Have hearing tests as your doctor suggests. They can show whether your hearing has changed. Your hearing aid may need to be adjusted.  Use other devices as needed. These may include:  Telephone amplifiers and hearing aids that can connect to a television, stereo, radio, or microphone.  Devices that use lights or vibrations. These alert you to the doorbell, a ringing telephone, or a baby monitor.  Television closed-captioning. This shows the words at the bottom of the screen. Most new TVs can do this.  TTY (text telephone). This lets you type messages back and forth on the telephone instead of  "talking or listening. These devices are also called TDD. When messages are typed on the keyboard, they are sent over the phone line to a receiving TTY. The message is shown on a monitor.  Use text messaging, social media, and email if it is hard for you to communicate by telephone.  Try to learn a listening technique called speechreading. It is not lipreading. You pay attention to people's gestures, expressions, posture, and tone of voice. These clues can help you understand what a person is saying. Face the person you are talking to, and have them face you. Make sure the lighting is good. You need to see the other person's face clearly.  Think about counseling if you need help to adjust to your hearing loss.  When should you call for help?  Watch closely for changes in your health, and be sure to contact your doctor if:    You think your hearing is getting worse.     You have new symptoms, such as dizziness or nausea.   Where can you learn more?  Go to https://www.[employer].net/patiented  Enter R798 in the search box to learn more about \"Hearing Loss: Care Instructions.\"  Current as of: September 27, 2023  Content Version: 14.2 2024 [employer].   Care instructions adapted under license by your healthcare professional. If you have questions about a medical condition or this instruction, always ask your healthcare professional. [employer] disclaims any warranty or liability for your use of this information.    Bladder Training: Care Instructions  Your Care Instructions     Bladder training is used to treat urge incontinence and stress incontinence. Urge incontinence means that the need to urinate comes on so fast that you can't get to a toilet in time. Stress incontinence means that you leak urine because of pressure on your bladder. For example, it may happen when you laugh, cough, or lift something heavy.  Bladder training can increase how long you can wait before you have to urinate. " It can also help your bladder hold more urine. And it can give you better control over the urge to urinate.  It is important to remember that bladder training takes a few weeks to a few months to make a difference. You may not see results right away, but don't give up.  Follow-up care is a key part of your treatment and safety. Be sure to make and go to all appointments, and call your doctor if you are having problems. It's also a good idea to know your test results and keep a list of the medicines you take.  How can you care for yourself at home?  Work with your doctor to come up with a bladder training program that is right for you. You may use one or more of the following methods.  Delayed urination  In the beginning, try to keep from urinating for 5 minutes after you first feel the need to go.  While you wait, take deep, slow breaths to relax. Kegel exercises can also help you delay the need to go to the bathroom.  After some practice, when you can easily wait 5 minutes to urinate, try to wait 10 minutes before you urinate.  Slowly increase the waiting period until you are able to control when you have to urinate.  Scheduled urination  Empty your bladder when you first wake up in the morning.  Schedule times throughout the day when you will urinate.  Start by going to the bathroom every hour, even if you don't need to go.  Slowly increase the time between trips to the bathroom.  When you have found a schedule that works well for you, keep doing it.  If you wake up during the night and have to urinate, do it. Apply your schedule to waking hours only.  Kegel exercises  These tighten and strengthen pelvic muscles, which can help you control the flow of urine. (If doing these exercises causes pain, stop doing them and talk with your doctor.) To do Kegel exercises:  Squeeze your muscles as if you were trying not to pass gas. Or squeeze your muscles as if you were stopping the flow of urine. Your belly, legs, and  "buttocks shouldn't move.  Hold the squeeze for 3 seconds, then relax for 5 to 10 seconds.  Start with 3 seconds, then add 1 second each week until you are able to squeeze for 10 seconds.  Repeat the exercise 10 times a session. Do 3 to 8 sessions a day.  When should you call for help?  Watch closely for changes in your health, and be sure to contact your doctor if:    Your incontinence is getting worse.     You do not get better as expected.   Where can you learn more?  Go to https://www.[employer].net/patiented  Enter V684 in the search box to learn more about \"Bladder Training: Care Instructions.\"  Current as of: November 15, 2023  Content Version: 14.2 2024 [facility], [facility].   Care instructions adapted under license by your healthcare professional. If you have questions about a medical condition or this instruction, always ask your healthcare professional. [employer] disclaims any warranty or liability for your use of this information.       "

## 2024-10-10 NOTE — LETTER
My Asthma Action Plan    Name: Sharif Kumar   YOB: 1935  Date: 10/10/2024   My doctor: Joel Daniel Wegener, MD   My clinic: [facility]        My Rescue Medicine:   Albuterol inhaler (Proair/Ventolin/Proventil HFA)  2-4 puffs EVERY 4 HOURS as needed. Use a spacer if recommended by your provider.   My Asthma Severity:   Intermittent / Exercise Induced  Know your asthma triggers: smoke, upper respiratory infections, dust mites, pollens, animal dander, insects/rodents, mold, humidity, aspirin, strong odors and fumes, occupational exposure, exercise or sports, emotions, cold air, and Gastric Reflux             GREEN ZONE   Good Control  I feel good  No cough or wheeze  Can work, sleep and play without asthma symptoms       Take your asthma control medicine every day.     If exercise triggers your asthma, take your rescue medication  15 minutes before exercise or sports, and  During exercise if you have asthma symptoms  Spacer to use with inhaler: If you have a spacer, make sure to use it with your inhaler             YELLOW ZONE Getting Worse  I have ANY of these:  I do not feel good  Cough or wheeze  Chest feels tight  Wake up at night   Keep taking your Green Zone medications  Start taking your rescue medicine:  every 20 minutes for up to 1 hour. Then every 4 hours for 24-48 hours.  If you stay in the Yellow Zone for more than 12-24 hours, contact your doctor.  If you do not return to the Green Zone in 12-24 hours or you get worse, start taking your oral steroid medicine if prescribed by your provider.           RED ZONE Medical Alert - Get Help  I have ANY of these:  I feel awful  Medicine is not helping  Breathing getting harder  Trouble walking or talking  Nose opens wide to breathe       Take your rescue medicine NOW  If your provider has prescribed an oral steroid medicine, start taking it NOW  Call your doctor NOW  If you are still in the Red Zone after 20 minutes and you have  not reached your doctor:  Take your rescue medicine again and  Call 911 or go to the emergency room right away    See your regular doctor within 2 weeks of an Emergency Room or Urgent Care visit for follow-up treatment.          Annual Reminders:  Meet with Asthma Educator,  Flu Shot in the Fall, consider Pneumonia Vaccination for patients with asthma (aged 19 and older).    Pharmacy[facility]    [id number] - [facility] [address] AT [facility] [address]  [facility]  [address]    Electronically signed by Joel Daniel Wegener, MD   Date: 10/10/24                    Asthma Triggers  How To Control Things That Make Your Asthma Worse    Triggers are things that make your asthma worse.  Look at the list below to help you find your triggers and   what you can do about them. You can help prevent asthma flare-ups by staying away from your triggers.      Trigger                                                          What you can do   Cigarette Smoke  Tobacco smoke can make asthma worse. Do not allow smoking in your home, car or around you.  Be sure no one smokes at a child s day care or school.  If you smoke, ask your health care provider for ways to help you quit.  Ask family members to quit too.  Ask your health care provider for a referral to Quit Plan to help you quit smoking, or call 8-889-399-PLAN.     Colds, Flu, Bronchitis  These are common triggers of asthma. Wash your hands often.  Don t touch your eyes, nose or mouth.  Get a flu shot every year.     Dust Mites  These are tiny bugs that live in cloth or carpet. They are too small to see. Wash sheets and blankets in hot water every week.   Encase pillows and mattress in dust mite proof covers.  Avoid having carpet if you can. If you have carpet, vacuum weekly.   Use a dust mask and HEPA vacuum.   Pollen and Outdoor Mold  Some people are allergic to trees, grass, or weed pollen, or molds. Try to keep your windows closed.  Limit time out  doors when pollen count is high.   Ask you health care provider about taking medicine during allergy season.     Animal Dander  Some people are allergic to skin flakes, urine or saliva from pets with fur or feathers. Keep pets with fur or feathers out of your home.    If you can t keep the pet outdoors, then keep the pet out of your bedroom.  Keep the bedroom door closed.  Keep pets off cloth furniture and away from stuffed toys.     Mice, Rats, and Cockroaches  Some people are allergic to the waste from these pests.   Cover food and garbage.  Clean up spills and food crumbs.  Store grease in the refrigerator.   Keep food out of the bedroom.   Indoor Mold  This can be a trigger if your home has high moisture. Fix leaking faucets, pipes, or other sources of water.   Clean moldy surfaces.  Dehumidify basement if it is damp and smelly.   Smoke, Strong Odors, and Sprays  These can reduce air quality. Stay away from strong odors and sprays, such as perfume, powder, hair spray, paints, smoke incense, paint, cleaning products, candles and new carpet.   Exercise or Sports  Some people with asthma have this trigger. Be active!  Ask your doctor about taking medicine before sports or exercise to prevent symptoms.    Warm up for 5-10 minutes before and after sports or exercise.     Other Triggers of Asthma  Cold air:  Cover your nose and mouth with a scarf.  Sometimes laughing or crying can be a trigger.  Some medicines and food can trigger asthma.

## 2024-10-10 NOTE — PROGRESS NOTES
Preventive Care Visit  [facility]  Joel Daniel Wegener, MD, Family Medicine  Oct 10, 2024      Assessment & Plan     Encounter for Medicare annual wellness exam  Wellness Visit Notes:     -Colon Cancer Screening: Discontinued/Aged Out  -PSA: Last done 9/2023 (Result: 3.0). Patient requesting PSA lab work.   Discussed risks/benefits of PSA testing today in detail, including possibility of false positive results and/or possibility of biopsy recommended as next step.     -Dermatology: Pt verbalized they do meet with dermatology regularly. .    -Immunizations: Patient is due/able to receive at clinic today: Flu and Covid - Received through [facility] facility. Historical updated.  Patient is due for the following vaccines: RSV. Advised patient to receive at a pharmacy due to Medicare insurance coverage.     Asthma Action Plan completed. Sent to patient via [employer].  Printed and given to patient. Reviewed with patient.     Chronic kidney disease, stage 3a (H)    - Albumin Random Urine Quantitative with Creat Ratio; Future  - Hemoglobin; Future  - Basic metabolic panel  (Ca, Cl, CO2, Creat, Gluc, K, Na, BUN); Future  - Albumin Random Urine Quantitative with Creat Ratio  - Hemoglobin  - Basic metabolic panel  (Ca, Cl, CO2, Creat, Gluc, K, Na, BUN)    Hyperlipidemia, unspecified hyperlipidemia type    - Lipid panel reflex to direct LDL Non-fasting; Future  - Lipid panel reflex to direct LDL Non-fasting    Screening for prostate cancer    - PROSTATE SPEC ANTIGEN SCREEN; Future  - PROSTATE SPEC ANTIGEN SCREEN    Balanitis    Rash on end of penis and in groin:   - take fluconazole 150mg daily for three days.  - use triamcinolone ointment on the areas twice daily for two weeks  - assuming improved at that time in two weeks then stop using triamcinolone and use miconazole powder after daily shower or bath for prevention     Recommend rsv from pharmacy  - clotrimazole (LOTRIMIN) 1 % external cream; Apply  "topically 2 times daily. Apply to the glans penis twice per day.  - triamcinolone (KENALOG) 0.1 % external ointment; Apply topically 2 times daily.  - fluconazole (DIFLUCAN) 150 MG tablet; Take 1 tablet (150 mg) by mouth every 3 days.  - miconazole (MICATIN) 2 % external powder; Apply topically as needed for itching or other (rash).    Jock itch  Same as above.   - fluconazole (DIFLUCAN) 150 MG tablet; Take 1 tablet (150 mg) by mouth every 3 days.  - miconazole (MICATIN) 2 % external powder; Apply topically as needed for itching or other (rash).    Patient has been advised of split billing requirements and indicates understanding: Yes    In addition to preventive health exam and counseling 35 minutes spent on the date of the encounter doing chart review, history and exam, negotiating and explaining the plan with the patient, documentation and further activities as noted above.          BMI  Estimated body mass index is 27.69 kg/m  as calculated from the following:    Height as of this encounter: 1.651 m (5' 5\").    Weight as of this encounter: 75.5 kg (166 lb 6.4 oz).   Weight management plan: Discussed healthy diet and exercise guidelines    Counseling  Appropriate preventive services were addressed with this patient via screening, questionnaire, or discussion as appropriate for fall prevention, nutrition, physical activity, Tobacco-use cessation, social engagement, weight loss and cognition.  Checklist reviewing preventive services available has been given to the patient.  Reviewed patient's diet, addressing concerns and/or questions.   He is at risk for psychosocial distress and has been provided with information to reduce risk.   The patient was provided with written information regarding signs of hearing loss.   Information on urinary incontinence and treatment options given to patient.           Tracy Ramos is a 89 year old, presenting for the following:  Physical (AWV)        10/10/2024     2:59 PM "   Additional Questions   Roomed by Ade GALVAN         Health Care Directive  Patient has a Health Care Directive on file  Advance care planning document is on file and is current.    HPI      Patient concerns:  -rash in groin area                  10/10/2024   General Health   How would you rate your overall physical health? Good   Feel stress (tense, anxious, or unable to sleep) Only a little      (!) STRESS CONCERN      10/10/2024   Nutrition   Diet: Regular (no restrictions)            10/10/2024   Exercise   Days per week of moderate/strenous exercise 5 days            10/10/2024   Social Factors   Frequency of gathering with friends or relatives Three times a week   Worry food won't last until get money to buy more No   Food not last or not have enough money for food? No   Do you have housing? (Housing is defined as stable permanent housing and does not include staying ouside in a car, in a tent, in an abandoned building, in an overnight shelter, or couch-surfing.) Yes   Are you worried about losing your housing? No   Lack of transportation? No   Unable to get utilities (heat,electricity)? No            10/10/2024   Fall Risk   Fallen 2 or more times in the past year? No    No   Trouble with walking or balance? No    No       Multiple values from one day are sorted in reverse-chronological order          10/10/2024   Activities of Daily Living- Home Safety   Needs help with the following daily activites None of the above   Safety concerns in the home None of the above            10/10/2024   Dental   Dentist two times every year? Yes            10/10/2024   Hearing Screening   Hearing concerns? (!) I FEEL THAT PEOPLE ARE MUMBLING OR NOT SPEAKING CLEARLY.    (!) I NEED TO ASK PEOPLE TO SPEAK UP OR REPEAT THEMSELVES.    (!) IT'S HARDER TO UNDERSTAND WOMEN'S VOICES THAN MEN'S VOICES.    (!) IT'S HARD TO FOLLOW A CONVERSATION IN A NOISY RESTAURANT OR CROWDED ROOM.    (!) TROUBLE UNDESTANDING A SPEAKER IN A PUBLIC  MEETING OR [employer] [facility]ICE.    (!) TROUBLE FOLLOWING DIALOGUE IN THE THEATHER.    (!) TROUBLE UNDERSTANDING SOFT OR WHISPERED SPEECH.    (!) TROUBLE UNDERSTANDING SPEECH ON THE TELEPHONE       Multiple values from one day are sorted in reverse-chronological order         10/10/2024   Driving Risk Screening   Patient/family members have concerns about driving No            10/10/2024   General Alertness/Fatigue Screening   Have you been more tired than usual lately? No            10/10/2024   Urinary Incontinence Screening   Bothered by leaking urine in past 6 months Yes            10/10/2024   TB Screening   Were you born outside of the [place]? No          Today's PHQ-9 Score:       10/10/2024     2:46 PM   PHQ-9 SCORE   PHQ-9 Total Score MyChart 2 (Minimal depression)   PHQ-9 Total Score 2         10/10/2024   Substance Use   Alcohol more than 3/day or more than 7/wk No   Do you have a current opioid prescription? No   How severe/bad is pain from 1 to 10? 2/10   Do you use any other substances recreationally? No        Social History     Tobacco Use    Smoking status: Never    Smokeless tobacco: Never    Tobacco comments:     occasional cigar maybe twice a year   Vaping Use    Vaping status: Never Used   Substance Use Topics    Alcohol use: Yes     Comment: OCCASIONAL WINE    Drug use: No             Reviewed and updated as needed this visit by Provider                    Past Medical History:   Diagnosis Date    Appendicitis, unspecified appendicitis type 7/30/2020    Added automatically from request for surgery [id number]    Helicobacter pylori (H. pylori)     Hypertrophy (benign) of prostate     Intermittent asthma 3/20/2012    Polyp of gallbladder 3/20/2012    Priapism     Ruptured appendicitis 7/30/2020     Past Surgical History:   Procedure Laterality Date    LAPAROSCOPIC APPENDECTOMY N/A 7/30/2020    Procedure: APPENDECTOMY, LAPAROSCOPIC;  Surgeon: Mercedes Sommer MD;  Location:  OR     Current providers  "sharing in care for this patient include:  Patient Care Team:  Wegener, Joel Daniel Irwin, MD as PCP - General (Family Medicine)  Wegener, Joel Daniel Irwin, MD as Assigned PCP  Teresa Oden PA-C as Assigned Musculoskeletal Provider    The following health maintenance items are reviewed in Epic and correct as of today:  Health Maintenance   Topic Date Due    RSV VACCINE (1 - 1-dose 75+ series) Never done    ASTHMA CONTROL TEST  11/09/2024    COVID-19 Vaccine (8 - 2024-25 season) 11/11/2024    MEDICARE ANNUAL WELLNESS VISIT  10/10/2025    BMP  10/10/2025    LIPID  10/10/2025    MICROALBUMIN  10/10/2025    PSA  10/10/2025    ANNUAL REVIEW OF HM ORDERS  10/10/2025    ASTHMA ACTION PLAN  10/10/2025    FALL RISK ASSESSMENT  10/10/2025    HEMOGLOBIN  10/10/2025    DTAP/TDAP/TD IMMUNIZATION (2 - Td or Tdap) 12/19/2026    ADVANCE CARE PLANNING  10/10/2029    PHQ-2 (once per calendar year)  Completed    INFLUENZA VACCINE  Completed    Pneumococcal Vaccine: 65+ Years  Completed    URINALYSIS  Completed    ZOSTER IMMUNIZATION  Completed    HPV IMMUNIZATION  Aged Out    MENINGITIS IMMUNIZATION  Aged Out    RSV MONOCLONAL ANTIBODY  Aged Out    COLORECTAL CANCER SCREENING  Discontinued         Review of Systems  Constitutional, neuro, ENT, endocrine, pulmonary, cardiac, gastrointestinal, genitourinary, musculoskeletal, integument and psychiatric systems are negative, except as otherwise noted.     Objective    Exam  /61   Pulse 63   Temp 98.3  F (36.8  C) (Temporal)   Resp 18   Ht 1.651 m (5' 5\")   Wt 75.5 kg (166 lb 6.4 oz)   SpO2 97%   BMI 27.69 kg/m     Estimated body mass index is 27.69 kg/m  as calculated from the following:    Height as of this encounter: 1.651 m (5' 5\").    Weight as of this encounter: 75.5 kg (166 lb 6.4 oz).    Physical Exam  GENERAL: alert and no distress  EYES: Eyes grossly normal to inspection, PERRL and conjunctivae and sclerae normal  HENT: ear canals and TM's normal, " "nose and mouth without ulcers or lesions  NECK: no adenopathy, no asymmetry, masses, or scars  RESP: lungs clear to auscultation - no rales, rhonchi or wheezes  CV: regular rate and rhythm, normal S1 S2, no S3 or S4, no murmur, click or rub, no peripheral edema  ABDOMEN: soft, nontender, no hepatosplenomegaly, no masses and bowel sounds normal  MS: no gross musculoskeletal defects noted, no edema  SKIN: no suspicious lesions or rashes  :  most/odorous/macerated intertriginous areas on right side consistent with \"jock itch\"  no signs of acute cellulitis/not tender or warm. Head of penis with erythema surrounding crown with foreskin retracted consistent with balanitis.   NEURO: Normal strength and tone, mentation intact and speech normal  PSYCH: mentation appears normal, affect normal/bright         10/10/2024   Mini Cog   Clock Draw Score 2 Normal   3 Item Recall 3 objects recalled   Mini Cog Total Score 5                 Signed Electronically by: Joel Daniel Wegener, MD    Answers submitted by the patient for this visit:  Patient Health Questionnaire (Submitted on 10/10/2024)  If you checked off any problems, how difficult have these problems made it for you to do your work, take care of things at home, or get along with other people?: Not difficult at all  PHQ9 TOTAL SCORE: 2  Patient Health Questionnaire (G7) (Submitted on 10/10/2024)  NAMRATA 7 TOTAL SCORE: 2    "

## 2024-10-11 LAB
ANION GAP SERPL CALCULATED.3IONS-SCNC: 10 MMOL/L (ref 7–15)
BUN SERPL-MCNC: 24.3 MG/DL (ref 8–23)
CALCIUM SERPL-MCNC: 8.9 MG/DL (ref 8.8–10.4)
CHLORIDE SERPL-SCNC: 107 MMOL/L (ref 98–107)
CHOLEST SERPL-MCNC: 167 MG/DL
CREAT SERPL-MCNC: 1.29 MG/DL (ref 0.67–1.17)
CREAT UR-MCNC: 99.7 MG/DL
EGFRCR SERPLBLD CKD-EPI 2021: 54 ML/MIN/1.73M2
FASTING STATUS PATIENT QL REPORTED: NO
FASTING STATUS PATIENT QL REPORTED: NO
GLUCOSE SERPL-MCNC: 91 MG/DL (ref 70–99)
HCO3 SERPL-SCNC: 22 MMOL/L (ref 22–29)
HDLC SERPL-MCNC: 55 MG/DL
LDLC SERPL CALC-MCNC: 88 MG/DL
MICROALBUMIN UR-MCNC: <12 MG/L
MICROALBUMIN/CREAT UR: NORMAL MG/G{CREAT}
NONHDLC SERPL-MCNC: 112 MG/DL
POTASSIUM SERPL-SCNC: 5 MMOL/L (ref 3.4–5.3)
PSA SERPL DL<=0.01 NG/ML-MCNC: 27.7 NG/ML
SODIUM SERPL-SCNC: 139 MMOL/L (ref 135–145)
TRIGL SERPL-MCNC: 121 MG/DL

## 2024-10-23 ENCOUNTER — TELEPHONE (OUTPATIENT)
Dept: FAMILY MEDICINE | Facility: CLINIC | Age: 89
End: 2024-10-23
Payer: COMMERCIAL

## 2024-10-24 NOTE — TELEPHONE ENCOUNTER
Call re: unviewed results as below:      Don,     Your PSA is about 10 points higher than last year.  This could be due to prostate cancer or simply due to an enlarging prostate.  Please let me know if you would like to see a urologist to investigate this further.     You have a very mild anemia which is stable.     Your kidney function is reduced but stable at about 55%.     Your cholesterol was in a good range for age 89.     Best,  Joel Wegener,MD  ([employer] message sent)      .jw

## 2024-11-04 NOTE — TELEPHONE ENCOUNTER
Left message for patient to call [facility] back  When patient calls back please transfer to an RN  Erma BARKER RN

## 2024-11-05 NOTE — TELEPHONE ENCOUNTER
Attempted to call pt. Pt's spouse answered (C2C on file for wife Sayda). Gave her the message from MD (see below).     All questions were answered. Sayda expressed understanding.     Azalia PAINTING RN

## 2024-12-03 ENCOUNTER — APPOINTMENT (OUTPATIENT)
Dept: URBAN - METROPOLITAN AREA CLINIC 256 | Age: 89
Setting detail: DERMATOLOGY
End: 2024-12-03

## 2024-12-03 DIAGNOSIS — D18.0 HEMANGIOMA: ICD-10-CM

## 2024-12-03 DIAGNOSIS — B37.42 CANDIDAL BALANITIS: ICD-10-CM

## 2024-12-03 DIAGNOSIS — Z71.89 OTHER SPECIFIED COUNSELING: ICD-10-CM

## 2024-12-03 DIAGNOSIS — L82.1 OTHER SEBORRHEIC KERATOSIS: ICD-10-CM

## 2024-12-03 DIAGNOSIS — L82.0 INFLAMED SEBORRHEIC KERATOSIS: ICD-10-CM

## 2024-12-03 DIAGNOSIS — L57.8 OTHER SKIN CHANGES DUE TO CHRONIC EXPOSURE TO NONIONIZING RADIATION: ICD-10-CM

## 2024-12-03 DIAGNOSIS — L57.0 ACTINIC KERATOSIS: ICD-10-CM

## 2024-12-03 DIAGNOSIS — D22 MELANOCYTIC NEVI: ICD-10-CM

## 2024-12-03 PROBLEM — D22.71 MELANOCYTIC NEVI OF RIGHT LOWER LIMB, INCLUDING HIP: Status: ACTIVE | Noted: 2024-12-03

## 2024-12-03 PROBLEM — D22.5 MELANOCYTIC NEVI OF TRUNK: Status: ACTIVE | Noted: 2024-12-03

## 2024-12-03 PROBLEM — D22.72 MELANOCYTIC NEVI OF LEFT LOWER LIMB, INCLUDING HIP: Status: ACTIVE | Noted: 2024-12-03

## 2024-12-03 PROBLEM — D22.39 MELANOCYTIC NEVI OF OTHER PARTS OF FACE: Status: ACTIVE | Noted: 2024-12-03

## 2024-12-03 PROBLEM — D22.62 MELANOCYTIC NEVI OF LEFT UPPER LIMB, INCLUDING SHOULDER: Status: ACTIVE | Noted: 2024-12-03

## 2024-12-03 PROBLEM — D18.01 HEMANGIOMA OF SKIN AND SUBCUTANEOUS TISSUE: Status: ACTIVE | Noted: 2024-12-03

## 2024-12-03 PROBLEM — D22.61 MELANOCYTIC NEVI OF RIGHT UPPER LIMB, INCLUDING SHOULDER: Status: ACTIVE | Noted: 2024-12-03

## 2024-12-03 PROCEDURE — OTHER MIPS QUALITY: OTHER

## 2024-12-03 PROCEDURE — OTHER ADDITIONAL NOTES: OTHER

## 2024-12-03 PROCEDURE — 99213 OFFICE O/P EST LOW 20 MIN: CPT | Mod: 25

## 2024-12-03 PROCEDURE — 17110 DESTRUCT B9 LESION 1-14: CPT

## 2024-12-03 PROCEDURE — 17003 DESTRUCT PREMALG LES 2-14: CPT | Mod: 59

## 2024-12-03 PROCEDURE — OTHER LIQUID NITROGEN: OTHER

## 2024-12-03 PROCEDURE — OTHER PRESCRIPTION: OTHER

## 2024-12-03 PROCEDURE — OTHER COUNSELING: OTHER

## 2024-12-03 PROCEDURE — OTHER PRESCRIPTION MEDICATION MANAGEMENT: OTHER

## 2024-12-03 PROCEDURE — 17000 DESTRUCT PREMALG LESION: CPT | Mod: 59

## 2024-12-03 RX ORDER — KETOCONAZOLE 20 MG/G
CREAM TOPICAL
Qty: 60 | Refills: 1 | Status: ERX

## 2024-12-03 ASSESSMENT — LOCATION DETAILED DESCRIPTION DERM
LOCATION DETAILED: LEFT INFERIOR CENTRAL MALAR CHEEK
LOCATION DETAILED: RIGHT DISTAL POSTERIOR UPPER ARM
LOCATION DETAILED: RIGHT VENTRAL DISTAL FOREARM
LOCATION DETAILED: LEFT VENTRAL PROXIMAL FOREARM
LOCATION DETAILED: RIGHT SUPERIOR FOREHEAD
LOCATION DETAILED: LEFT DISTAL POSTERIOR THIGH
LOCATION DETAILED: LEFT FOREHEAD
LOCATION DETAILED: LEFT DISTAL POSTERIOR UPPER ARM
LOCATION DETAILED: RIGHT ANTERIOR DISTAL THIGH
LOCATION DETAILED: RIGHT FOREHEAD
LOCATION DETAILED: RIGHT CENTRAL PARIETAL SCALP
LOCATION DETAILED: MID-OCCIPITAL SCALP
LOCATION DETAILED: RIGHT DISTAL POSTERIOR THIGH
LOCATION DETAILED: LEFT LATERAL ABDOMEN
LOCATION DETAILED: LEFT ANTERIOR PROXIMAL THIGH
LOCATION DETAILED: LEFT INFERIOR MEDIAL MIDBACK
LOCATION DETAILED: LEFT CLAVICULAR NECK

## 2024-12-03 ASSESSMENT — LOCATION SIMPLE DESCRIPTION DERM
LOCATION SIMPLE: POSTERIOR SCALP
LOCATION SIMPLE: ABDOMEN
LOCATION SIMPLE: LEFT FOREARM
LOCATION SIMPLE: RIGHT FOREHEAD
LOCATION SIMPLE: SCALP
LOCATION SIMPLE: LEFT LOWER BACK
LOCATION SIMPLE: RIGHT FOREARM
LOCATION SIMPLE: LEFT CHEEK
LOCATION SIMPLE: LEFT POSTERIOR THIGH
LOCATION SIMPLE: RIGHT POSTERIOR THIGH
LOCATION SIMPLE: LEFT FOREHEAD
LOCATION SIMPLE: RIGHT UPPER ARM
LOCATION SIMPLE: LEFT ANTERIOR NECK
LOCATION SIMPLE: LEFT THIGH
LOCATION SIMPLE: LEFT UPPER ARM
LOCATION SIMPLE: RIGHT THIGH

## 2024-12-03 ASSESSMENT — LOCATION ZONE DERM
LOCATION ZONE: SCALP
LOCATION ZONE: ARM
LOCATION ZONE: TRUNK
LOCATION ZONE: LEG
LOCATION ZONE: NECK
LOCATION ZONE: FACE

## 2024-12-03 NOTE — PROCEDURE: ADDITIONAL NOTES
Render Risk Assessment In Note?: no
Additional Notes: - Discussed with patient the only cure would be circumcision. Patient states the condition is not too bothersome and seems to be stable using ketoconazole. Patient states he would like a refill of this.
Detail Level: Simple

## 2024-12-03 NOTE — PROCEDURE: LIQUID NITROGEN
Consent: The patient's consent was obtained including but not limited to risks of crusting, scabbing, blistering, scarring, darker or lighter pigmentary change, recurrence, incomplete removal and infection.
Show Aperture Variable?: Yes
Render Note In Bullet Format When Appropriate: No
Post-Care Instructions: I reviewed with the patient in detail post-care instructions. Patient is to wear sunprotection, and avoid picking at any of the treated lesions. Pt may apply Vaseline to crusted or scabbing areas.
Duration Of Freeze Thaw-Cycle (Seconds): 0
Detail Level: Detailed
Spray Paint Text: The liquid nitrogen was applied to the skin utilizing a spray paint frosting technique.
Medical Necessity Clause: This procedure was medically necessary because the lesions that were treated were:
Medical Necessity Information: It is in your best interest to select a reason for this procedure from the list below. All of these items fulfill various CMS LCD requirements except the new and changing color options.

## 2024-12-07 NOTE — PROCEDURE: MOHS SURGERY
Alert, cooperative and visible intermittently. No SI or HI noted. Pt states he still has depression, and anxiety. Continues with auditory hallucinations. Denies pain. Attended art therapy, and nursing education. Refused breakfast and consumed 75% of lunch. Took all medication without prompting. Pt had in person visit @ 1100 with sister and niece. Visit went well. Pt's sister dropped off a $39.00 money order which was sent with security. Maintained on safe precautions without incident. Will continue to monitor progress and recovery program.    Unna Boot Text: An Unna boot was placed to help immobilize the limb and facilitate more rapid healing.

## 2025-03-10 ENCOUNTER — TRANSFERRED RECORDS (OUTPATIENT)
Dept: HEALTH INFORMATION MANAGEMENT | Facility: CLINIC | Age: 89
End: 2025-03-10
Payer: COMMERCIAL

## 2025-06-03 ENCOUNTER — APPOINTMENT (OUTPATIENT)
Dept: URBAN - METROPOLITAN AREA CLINIC 256 | Age: 89
Setting detail: DERMATOLOGY
End: 2025-06-03

## 2025-06-03 VITALS — WEIGHT: 160 LBS | HEIGHT: 66 IN

## 2025-06-03 DIAGNOSIS — L57.0 ACTINIC KERATOSIS: ICD-10-CM

## 2025-06-03 DIAGNOSIS — L82.1 OTHER SEBORRHEIC KERATOSIS: ICD-10-CM

## 2025-06-03 DIAGNOSIS — B37.42 CANDIDAL BALANITIS: ICD-10-CM

## 2025-06-03 DIAGNOSIS — D22 MELANOCYTIC NEVI: ICD-10-CM

## 2025-06-03 DIAGNOSIS — Z71.89 OTHER SPECIFIED COUNSELING: ICD-10-CM

## 2025-06-03 DIAGNOSIS — L57.8 OTHER SKIN CHANGES DUE TO CHRONIC EXPOSURE TO NONIONIZING RADIATION: ICD-10-CM

## 2025-06-03 DIAGNOSIS — L82.0 INFLAMED SEBORRHEIC KERATOSIS: ICD-10-CM

## 2025-06-03 DIAGNOSIS — D18.0 HEMANGIOMA: ICD-10-CM

## 2025-06-03 DIAGNOSIS — Z85.828 PERSONAL HISTORY OF OTHER MALIGNANT NEOPLASM OF SKIN: ICD-10-CM

## 2025-06-03 PROBLEM — D22.62 MELANOCYTIC NEVI OF LEFT UPPER LIMB, INCLUDING SHOULDER: Status: ACTIVE | Noted: 2025-06-03

## 2025-06-03 PROBLEM — D22.5 MELANOCYTIC NEVI OF TRUNK: Status: ACTIVE | Noted: 2025-06-03

## 2025-06-03 PROBLEM — D22.71 MELANOCYTIC NEVI OF RIGHT LOWER LIMB, INCLUDING HIP: Status: ACTIVE | Noted: 2025-06-03

## 2025-06-03 PROBLEM — D22.39 MELANOCYTIC NEVI OF OTHER PARTS OF FACE: Status: ACTIVE | Noted: 2025-06-03

## 2025-06-03 PROBLEM — D22.72 MELANOCYTIC NEVI OF LEFT LOWER LIMB, INCLUDING HIP: Status: ACTIVE | Noted: 2025-06-03

## 2025-06-03 PROBLEM — D22.61 MELANOCYTIC NEVI OF RIGHT UPPER LIMB, INCLUDING SHOULDER: Status: ACTIVE | Noted: 2025-06-03

## 2025-06-03 PROBLEM — D18.01 HEMANGIOMA OF SKIN AND SUBCUTANEOUS TISSUE: Status: ACTIVE | Noted: 2025-06-03

## 2025-06-03 PROCEDURE — OTHER ADDITIONAL NOTES: OTHER

## 2025-06-03 PROCEDURE — OTHER MIPS QUALITY: OTHER

## 2025-06-03 PROCEDURE — OTHER LIQUID NITROGEN: OTHER

## 2025-06-03 PROCEDURE — 17000 DESTRUCT PREMALG LESION: CPT | Mod: 59

## 2025-06-03 PROCEDURE — OTHER PRESCRIPTION MEDICATION MANAGEMENT: OTHER

## 2025-06-03 PROCEDURE — OTHER COUNSELING: OTHER

## 2025-06-03 PROCEDURE — 17110 DESTRUCT B9 LESION 1-14: CPT

## 2025-06-03 PROCEDURE — 99213 OFFICE O/P EST LOW 20 MIN: CPT | Mod: 25

## 2025-06-03 ASSESSMENT — LOCATION DETAILED DESCRIPTION DERM
LOCATION DETAILED: LEFT DISTAL POSTERIOR UPPER ARM
LOCATION DETAILED: RIGHT DISTAL POSTERIOR UPPER ARM
LOCATION DETAILED: LEFT INFERIOR MEDIAL MIDBACK
LOCATION DETAILED: RIGHT SUPERIOR PARIETAL SCALP
LOCATION DETAILED: DORSAL CORONA
LOCATION DETAILED: LEFT ANTERIOR PROXIMAL THIGH
LOCATION DETAILED: LEFT INFERIOR CENTRAL MALAR CHEEK
LOCATION DETAILED: RIGHT DISTAL POSTERIOR THIGH
LOCATION DETAILED: RIGHT VENTRAL DISTAL FOREARM
LOCATION DETAILED: RIGHT ANTERIOR DISTAL THIGH
LOCATION DETAILED: LEFT LATERAL ABDOMEN
LOCATION DETAILED: LEFT DISTAL POSTERIOR THIGH
LOCATION DETAILED: LEFT VENTRAL PROXIMAL FOREARM
LOCATION DETAILED: LEFT ANTERIOR PROXIMAL THIGH
LOCATION DETAILED: LEFT DISTAL DORSAL FOREARM

## 2025-06-03 ASSESSMENT — LOCATION SIMPLE DESCRIPTION DERM
LOCATION SIMPLE: SCALP
LOCATION SIMPLE: RIGHT POSTERIOR THIGH
LOCATION SIMPLE: LEFT CHEEK
LOCATION SIMPLE: LEFT THIGH
LOCATION SIMPLE: LEFT LOWER BACK
LOCATION SIMPLE: RIGHT FOREARM
LOCATION SIMPLE: LEFT POSTERIOR THIGH
LOCATION SIMPLE: LEFT UPPER ARM
LOCATION SIMPLE: LEFT THIGH
LOCATION SIMPLE: CORONA
LOCATION SIMPLE: LEFT FOREARM
LOCATION SIMPLE: RIGHT UPPER ARM
LOCATION SIMPLE: ABDOMEN
LOCATION SIMPLE: RIGHT THIGH

## 2025-06-03 ASSESSMENT — LOCATION ZONE DERM
LOCATION ZONE: LEG
LOCATION ZONE: PENIS
LOCATION ZONE: ARM
LOCATION ZONE: FACE
LOCATION ZONE: SCALP
LOCATION ZONE: LEG
LOCATION ZONE: TRUNK

## 2025-07-15 ENCOUNTER — OFFICE VISIT (OUTPATIENT)
Dept: UROLOGY | Facility: CLINIC | Age: 89
End: 2025-07-15
Payer: COMMERCIAL

## 2025-07-15 VITALS
OXYGEN SATURATION: 95 % | DIASTOLIC BLOOD PRESSURE: 51 MMHG | WEIGHT: 163 LBS | HEART RATE: 70 BPM | BODY MASS INDEX: 27.12 KG/M2 | SYSTOLIC BLOOD PRESSURE: 121 MMHG

## 2025-07-15 DIAGNOSIS — N48.1 BALANITIS: Primary | ICD-10-CM

## 2025-07-15 PROCEDURE — 3078F DIAST BP <80 MM HG: CPT | Performed by: UROLOGY

## 2025-07-15 PROCEDURE — 3074F SYST BP LT 130 MM HG: CPT | Performed by: UROLOGY

## 2025-07-15 PROCEDURE — 99213 OFFICE O/P EST LOW 20 MIN: CPT | Performed by: UROLOGY

## 2025-07-15 NOTE — PROGRESS NOTES
Chief Complaint   Patient presents with    RECHECK       Sharif Kumar is a 89 year old male who presents today for follow up of   Chief Complaint   Patient presents with    RECHECK    Patient c/o penile irritation off and on for a long time.  He has used triamcinolone on his foreskin which helps.  He was seen by his dermatologist recently and was told that circumcision will help with this.  He is not bothered by his symptoms.    Current Outpatient Medications   Medication Sig Dispense Refill    acetaminophen (TYLENOL) 500 MG tablet Take 500-1,000 mg by mouth every 6 hours as needed for mild pain      clotrimazole (LOTRIMIN) 1 % external cream Apply topically 2 times daily. Apply to the glans penis twice per day. 30 g 4    fexofenadine (ALLEGRA) 180 MG tablet Take 1 tablet (180 mg) by mouth daily 30 tablet 1    fluticasone (FLONASE) 50 MCG/ACT nasal spray Spray 2 sprays into both nostrils daily 9.9 mL 1    miconazole (MICATIN) 2 % external powder Apply topically as needed for itching or other (rash). 85 g 3    triamcinolone (KENALOG) 0.1 % external ointment Apply topically 2 times daily. 30 g 1    ACE/ARB/ARNI NOT PRESCRIBED (INTENTIONAL) Please choose reason not prescribed from choices below.      fluconazole (DIFLUCAN) 150 MG tablet Take 1 tablet (150 mg) by mouth every 3 days. (Patient not taking: Reported on 7/15/2025) 3 tablet 0     Allergies   Allergen Reactions    Seasonal Allergies      Molds, grass      Past Medical History:   Diagnosis Date    Appendicitis, unspecified appendicitis type 7/30/2020    Added automatically from request for surgery [id number]    Helicobacter pylori (H. pylori)     Hypertrophy (benign) of prostate     Intermittent asthma 3/20/2012    Polyp of gallbladder 3/20/2012    Priapism     Ruptured appendicitis 7/30/2020     Past Surgical History:   Procedure Laterality Date    LAPAROSCOPIC APPENDECTOMY N/A 7/30/2020    Procedure: APPENDECTOMY, LAPAROSCOPIC;  Surgeon: Mercedes Sommer MD;   Location: [facility] OR     Family History   Problem Relation Age of Onset    Alzheimer Disease Mother          age 92; dementia by age 85    Cerebrovascular Disease Father          age 97    Diabetes Brother     Other Cancer Brother         [employer] vet; agent [employer] exposure; wrote a book    Coronary Artery Disease No family hx of     Hypertension No family hx of     Hyperlipidemia No family hx of     Breast Cancer No family hx of     Colon Cancer No family hx of     Prostate Cancer No family hx of     Depression No family hx of     Anxiety Disorder No family hx of     Mental Illness No family hx of     Substance Abuse No family hx of     Anesthesia Reaction No family hx of     Asthma No family hx of     Osteoporosis No family hx of     Genetic Disorder No family hx of     Thyroid Disease No family hx of     Obesity No family hx of     Unknown/Adopted No family hx of      Social History     Socioeconomic History    Marital status:      Spouse name:     Number of children: 4    Years of education: None    Highest education level: None   Occupational History     Employer: RETIRED     Comment: [employer]    Tobacco Use    Smoking status: Never    Smokeless tobacco: Never    Tobacco comments:     occasional cigar maybe twice a year   Vaping Use    Vaping status: Never Used   Substance and Sexual Activity    Alcohol use: Yes     Comment: OCCASIONAL WINE    Drug use: No    Sexual activity: Not Currently   Other Topics Concern    Parent/sibling w/ CABG, MI or angioplasty before 65F 55M? Yes   Social History Narrative    Referred by Dr. Lynn                                       Mother had Alzheimer's; based on autopsy result.     Dx age 85;  age 92.                           Her mother also had Alzheimer's.         Social Drivers of Health     Financial Resource Strain: Low Risk  (10/10/2024)    Financial Resource Strain     Within the past 12 months, have you or your family members you  live with been unable to get utilities (heat, electricity) when it was really needed?: No   Food Insecurity: Low Risk  (10/10/2024)    Food Insecurity     Within the past 12 months, did you worry that your food would run out before you got money to buy more?: No     Within the past 12 months, did the food you bought just not last and you didn t have money to get more?: No   Transportation Needs: Low Risk  (10/10/2024)    Transportation Needs     Within the past 12 months, has lack of transportation kept you from medical appointments, getting your medicines, non-medical meetings or appointments, work, or from getting things that you need?: No   Physical Activity: Unknown (10/10/2024)    Exercise Vital Sign     Days of Exercise per Week: 5 days   Stress: No Stress Concern Present (10/10/2024)    [employer] [place] [employer] - Occupational Stress Questionnaire     Feeling of Stress : Only a little   Social Connections: Unknown (10/10/2024)    Social Connection and Isolation Panel [NHANES]     Frequency of Social Gatherings with Friends and Family: Three times a week   Interpersonal Safety: Low Risk  (10/10/2024)    Interpersonal Safety     Do you feel physically and emotionally safe where you currently live?: Yes     Within the past 12 months, have you been hit, slapped, kicked or otherwise physically hurt by someone?: No     Within the past 12 months, have you been humiliated or emotionally abused in other ways by your partner or ex-partner?: No   Housing Stability: Low Risk  (10/10/2024)    Housing Stability     Do you have housing? : Yes     Are you worried about losing your housing?: No       REVIEW OF SYSTEMS  =================  C: NEGATIVE for fever, chills, change in weight  I: NEGATIVE for worrisome rashes, moles or lesions  E/M: NEGATIVE for ear, mouth and throat problems  R: NEGATIVE for significant cough or SHORTNESS OF BREATH  CV:  NEGATIVE for chest pain, palpitations or peripheral edema  GI:  NEGATIVE for nausea, abdominal pain, heartburn, or change in bowel habits  NEURO: NEGATIVE numbness/weakness  : see HPI  PSYCH: NEGATIVE depression/anxiety  LYmph: no new enlarged lymph nodes  Ortho: no new trauma/movements    Physical Exam:  /51   Pulse 70   Wt 73.9 kg (163 lb)   SpO2 95%   BMI 27.12 kg/m     Patient is pleasant, in no acute distress, good general condition.  Lung: no evidence of respiratory distress    Abdomen: Soft, nondistended, non tender. No masses. No rebound or guarding.   Exam: penis uncircumcised with flat erythema lesions on penile glans and prepuce.  No penile discharge.  Testis no masses  Skin: Warm and dry.  No redness.  Psych: normal mood and affect  Neuro: alert and oriented  Musculaskeletal: moving all extremities    Assessment/Plan:   (N48.1) Balanitis  (primary encounter diagnosis)  Comment:    Plan: cont with steroids ointment prn          Briefly discussed circumcision.          Please note: Voice recognition software was used in this dictation.  It may therefore contain typographical errors.

## 2025-07-31 RX ORDER — KETOCONAZOLE 20 MG/G
CREAM TOPICAL
Qty: 60 | Refills: 2 | Status: ERX

## (undated) DEVICE — ESU GROUND PAD UNIVERSAL W/O CORD

## (undated) DEVICE — STPL RELOAD REG TISSUE ECHELON 45 X 3.6MM BLUE GST45B

## (undated) DEVICE — DRAIN JACKSON PRATT RESERVOIR 100ML SU130-1305

## (undated) DEVICE — PACK LAP CHOLE SLC15LCFSD

## (undated) DEVICE — MANIFOLD NEPTUNE 4 PORT 700-20

## (undated) DEVICE — STPL ENDO RELOAD ECHELON 45X2.0MM GREY ECR45M

## (undated) DEVICE — ENDO SCOPE WARMER LF TM500

## (undated) DEVICE — SUCTION IRR STRYKERFLOW II W/TIP 250-070-520

## (undated) DEVICE — SOL WATER IRRIG 1000ML BOTTLE 2F7114

## (undated) DEVICE — PREP CHLORAPREP 26ML TINTED ORANGE  260815

## (undated) DEVICE — ENDO TROCAR FIRST ENTRY KII FIOS Z-THRD 05X100MM CTF03

## (undated) DEVICE — SUCTION CANISTER MEDIVAC LINER 3000ML W/LID 65651-530

## (undated) DEVICE — GLOVE PROTEXIS BLUE W/NEU-THERA 6.5  2D73EB65

## (undated) DEVICE — DEVICE SUTURE GRASPER TROCAR CLOSURE 14GA PMITCSG

## (undated) DEVICE — BLADE CLIPPER SGL USE 9680

## (undated) DEVICE — ENDO TROCAR FIRST ENTRY KII FIOS Z-THRD 12X100MM CTF73

## (undated) DEVICE — ESU HOLDER LAP INST DISP PURPLE LONG 330MM H-PRO-330

## (undated) DEVICE — GLOVE PROTEXIS MICRO 6.0  2D73PM60

## (undated) DEVICE — SU MONOCRYL 4-0 PS-2 18" UND Y496G

## (undated) DEVICE — SU ETHILON 3-0 FS-1 18" 669H

## (undated) DEVICE — DRAIN JACKSON PRATT 15FR ROUND SU130-1323

## (undated) DEVICE — DRSG GAUZE 4X4" 3033

## (undated) DEVICE — LINEN TOWEL PACK X5 5464

## (undated) DEVICE — SU VICRYL 0 UR-6 27" J603H

## (undated) DEVICE — ENDO TROCAR SLEEVE KII Z-THREADED 05X100MM CTS02

## (undated) DEVICE — ENDO POUCH UNIV RETRIEVAL SYSTEM INZII 10MM CD001

## (undated) DEVICE — STPL POWERED ECHELON 45MM PSEE45A

## (undated) DEVICE — ESU LIGASURE MARYLAND LAPAROSCOPIC SLR/DVDR 5MMX37CM LF1937

## (undated) RX ORDER — DEXAMETHASONE SODIUM PHOSPHATE 10 MG/ML
INJECTION, SOLUTION INTRAMUSCULAR; INTRAVENOUS
Status: DISPENSED
Start: 2017-12-11

## (undated) RX ORDER — FENTANYL CITRATE 50 UG/ML
INJECTION, SOLUTION INTRAMUSCULAR; INTRAVENOUS
Status: DISPENSED
Start: 2020-07-30

## (undated) RX ORDER — LIDOCAINE HYDROCHLORIDE 10 MG/ML
INJECTION, SOLUTION EPIDURAL; INFILTRATION; INTRACAUDAL; PERINEURAL
Status: DISPENSED
Start: 2020-07-30

## (undated) RX ORDER — BUPIVACAINE HYDROCHLORIDE 2.5 MG/ML
INJECTION, SOLUTION EPIDURAL; INFILTRATION; INTRACAUDAL
Status: DISPENSED
Start: 2020-06-23

## (undated) RX ORDER — PIPERACILLIN SODIUM, TAZOBACTAM SODIUM 3; .375 G/15ML; G/15ML
INJECTION, POWDER, LYOPHILIZED, FOR SOLUTION INTRAVENOUS
Status: DISPENSED
Start: 2020-07-30

## (undated) RX ORDER — LIDOCAINE HYDROCHLORIDE 10 MG/ML
INJECTION, SOLUTION EPIDURAL; INFILTRATION; INTRACAUDAL; PERINEURAL
Status: DISPENSED
Start: 2017-12-11

## (undated) RX ORDER — BUPIVACAINE HYDROCHLORIDE AND EPINEPHRINE 5; 5 MG/ML; UG/ML
INJECTION, SOLUTION EPIDURAL; INTRACAUDAL; PERINEURAL
Status: DISPENSED
Start: 2020-07-30

## (undated) RX ORDER — DEXAMETHASONE SODIUM PHOSPHATE 4 MG/ML
INJECTION, SOLUTION INTRA-ARTICULAR; INTRALESIONAL; INTRAMUSCULAR; INTRAVENOUS; SOFT TISSUE
Status: DISPENSED
Start: 2020-06-23

## (undated) RX ORDER — LIDOCAINE HYDROCHLORIDE 10 MG/ML
INJECTION, SOLUTION EPIDURAL; INFILTRATION; INTRACAUDAL; PERINEURAL
Status: DISPENSED
Start: 2018-01-22

## (undated) RX ORDER — LIDOCAINE HYDROCHLORIDE 20 MG/ML
INJECTION, SOLUTION EPIDURAL; INFILTRATION; INTRACAUDAL; PERINEURAL
Status: DISPENSED
Start: 2020-07-30

## (undated) RX ORDER — DEXAMETHASONE SODIUM PHOSPHATE 4 MG/ML
INJECTION, SOLUTION INTRA-ARTICULAR; INTRALESIONAL; INTRAMUSCULAR; INTRAVENOUS; SOFT TISSUE
Status: DISPENSED
Start: 2020-07-30

## (undated) RX ORDER — DEXAMETHASONE SODIUM PHOSPHATE 10 MG/ML
INJECTION, SOLUTION INTRAMUSCULAR; INTRAVENOUS
Status: DISPENSED
Start: 2018-01-22